# Patient Record
Sex: MALE | Race: WHITE | Employment: OTHER | URBAN - METROPOLITAN AREA
[De-identification: names, ages, dates, MRNs, and addresses within clinical notes are randomized per-mention and may not be internally consistent; named-entity substitution may affect disease eponyms.]

---

## 2020-01-18 ENCOUNTER — HOSPITAL ENCOUNTER (EMERGENCY)
Age: 55
Discharge: HOME | End: 2020-01-18
Attending: EMERGENCY MEDICINE

## 2020-01-18 ENCOUNTER — APPOINTMENT (OUTPATIENT)
Dept: GENERAL RADIOLOGY | Age: 55
End: 2020-01-18

## 2020-01-18 VITALS
DIASTOLIC BLOOD PRESSURE: 68 MMHG | TEMPERATURE: 97.9 F | SYSTOLIC BLOOD PRESSURE: 100 MMHG | HEART RATE: 81 BPM | HEIGHT: 70 IN | OXYGEN SATURATION: 95 % | WEIGHT: 175 LBS | RESPIRATION RATE: 14 BRPM | BODY MASS INDEX: 25.05 KG/M2

## 2020-01-18 LAB
ABSOLUTE EOS #: 0.05 K/UL (ref 0–0.44)
ABSOLUTE IMMATURE GRANULOCYTE: <0.03 K/UL (ref 0–0.3)
ABSOLUTE LYMPH #: 1.61 K/UL (ref 1.1–3.7)
ABSOLUTE MONO #: 0.67 K/UL (ref 0.1–1.2)
ANION GAP SERPL CALCULATED.3IONS-SCNC: 15 MMOL/L (ref 9–17)
BASOPHILS # BLD: 0 % (ref 0–2)
BASOPHILS ABSOLUTE: <0.03 K/UL (ref 0–0.2)
BUN BLDV-MCNC: 8 MG/DL (ref 6–20)
BUN/CREAT BLD: ABNORMAL (ref 9–20)
CALCIUM SERPL-MCNC: 9.7 MG/DL (ref 8.6–10.4)
CHLORIDE BLD-SCNC: 99 MMOL/L (ref 98–107)
CO2: 22 MMOL/L (ref 20–31)
CREAT SERPL-MCNC: 0.99 MG/DL (ref 0.7–1.2)
D-DIMER QUANTITATIVE: 0.24 MG/L FEU
DIFFERENTIAL TYPE: ABNORMAL
EOSINOPHILS RELATIVE PERCENT: 1 % (ref 1–4)
GFR AFRICAN AMERICAN: >60 ML/MIN
GFR NON-AFRICAN AMERICAN: >60 ML/MIN
GFR SERPL CREATININE-BSD FRML MDRD: ABNORMAL ML/MIN/{1.73_M2}
GFR SERPL CREATININE-BSD FRML MDRD: ABNORMAL ML/MIN/{1.73_M2}
GLUCOSE BLD-MCNC: 130 MG/DL (ref 70–99)
HCT VFR BLD CALC: 40.5 % (ref 40.7–50.3)
HEMOGLOBIN: 13.5 G/DL (ref 13–17)
IMMATURE GRANULOCYTES: 0 %
LYMPHOCYTES # BLD: 29 % (ref 24–43)
MCH RBC QN AUTO: 28.9 PG (ref 25.2–33.5)
MCHC RBC AUTO-ENTMCNC: 33.3 G/DL (ref 28.4–34.8)
MCV RBC AUTO: 86.7 FL (ref 82.6–102.9)
MONOCYTES # BLD: 12 % (ref 3–12)
NRBC AUTOMATED: 0 PER 100 WBC
PDW BLD-RTO: 14.3 % (ref 11.8–14.4)
PLATELET # BLD: 291 K/UL (ref 138–453)
PLATELET ESTIMATE: ABNORMAL
PMV BLD AUTO: 9.9 FL (ref 8.1–13.5)
POTASSIUM SERPL-SCNC: 4.3 MMOL/L (ref 3.7–5.3)
RBC # BLD: 4.67 M/UL (ref 4.21–5.77)
RBC # BLD: ABNORMAL 10*6/UL
SEG NEUTROPHILS: 58 % (ref 36–65)
SEGMENTED NEUTROPHILS ABSOLUTE COUNT: 3.27 K/UL (ref 1.5–8.1)
SODIUM BLD-SCNC: 136 MMOL/L (ref 135–144)
TROPONIN INTERP: NORMAL
TROPONIN INTERP: NORMAL
TROPONIN T: NORMAL NG/ML
TROPONIN T: NORMAL NG/ML
TROPONIN, HIGH SENSITIVITY: <6 NG/L (ref 0–22)
TROPONIN, HIGH SENSITIVITY: <6 NG/L (ref 0–22)
VALPROIC ACID LEVEL: 50 UG/ML (ref 50–125)
VALPROIC DATE LAST DOSE: NORMAL
VALPROIC DOSE AMOUNT: NORMAL
VALPROIC TIME LAST DOSE: NORMAL
WBC # BLD: 5.6 K/UL (ref 3.5–11.3)
WBC # BLD: ABNORMAL 10*3/UL

## 2020-01-18 PROCEDURE — 80048 BASIC METABOLIC PNL TOTAL CA: CPT

## 2020-01-18 PROCEDURE — 99284 EMERGENCY DEPT VISIT MOD MDM: CPT

## 2020-01-18 PROCEDURE — 80164 ASSAY DIPROPYLACETIC ACD TOT: CPT

## 2020-01-18 PROCEDURE — 71046 X-RAY EXAM CHEST 2 VIEWS: CPT

## 2020-01-18 PROCEDURE — 84484 ASSAY OF TROPONIN QUANT: CPT

## 2020-01-18 PROCEDURE — 85025 COMPLETE CBC W/AUTO DIFF WBC: CPT

## 2020-01-18 PROCEDURE — 6370000000 HC RX 637 (ALT 250 FOR IP): Performed by: STUDENT IN AN ORGANIZED HEALTH CARE EDUCATION/TRAINING PROGRAM

## 2020-01-18 PROCEDURE — 93005 ELECTROCARDIOGRAM TRACING: CPT | Performed by: STUDENT IN AN ORGANIZED HEALTH CARE EDUCATION/TRAINING PROGRAM

## 2020-01-18 PROCEDURE — 85379 FIBRIN DEGRADATION QUANT: CPT

## 2020-01-18 RX ORDER — HYDROXYZINE PAMOATE 50 MG/1
50 CAPSULE ORAL 2 TIMES DAILY
Status: ON HOLD | COMMUNITY
End: 2021-04-12 | Stop reason: HOSPADM

## 2020-01-18 RX ORDER — AMITRIPTYLINE HYDROCHLORIDE 50 MG/1
50 TABLET, FILM COATED ORAL NIGHTLY
Status: ON HOLD | COMMUNITY
End: 2021-04-12 | Stop reason: HOSPADM

## 2020-01-18 RX ORDER — DIVALPROEX SODIUM 500 MG/1
500 TABLET, DELAYED RELEASE ORAL 2 TIMES DAILY
Status: ON HOLD | COMMUNITY
End: 2020-09-08 | Stop reason: HOSPADM

## 2020-01-18 RX ORDER — DIVALPROEX SODIUM 500 MG/1
500 TABLET, EXTENDED RELEASE ORAL DAILY
Status: DISCONTINUED | OUTPATIENT
Start: 2020-01-18 | End: 2020-01-18 | Stop reason: HOSPADM

## 2020-01-18 RX ORDER — BUSPIRONE HYDROCHLORIDE 15 MG/1
15 TABLET ORAL 3 TIMES DAILY
Status: ON HOLD | COMMUNITY
End: 2021-04-12 | Stop reason: SDUPTHER

## 2020-01-18 RX ORDER — PROPRANOLOL HYDROCHLORIDE 80 MG/1
80 TABLET ORAL DAILY
Status: ON HOLD | COMMUNITY
End: 2021-04-12 | Stop reason: SDUPTHER

## 2020-01-18 RX ADMIN — DIVALPROEX SODIUM 500 MG: 500 TABLET, EXTENDED RELEASE ORAL at 13:29

## 2020-01-18 SDOH — HEALTH STABILITY: MENTAL HEALTH: HOW OFTEN DO YOU HAVE A DRINK CONTAINING ALCOHOL?: NEVER

## 2020-01-18 ASSESSMENT — PAIN DESCRIPTION - PROGRESSION: CLINICAL_PROGRESSION: NOT CHANGED

## 2020-01-18 ASSESSMENT — PAIN DESCRIPTION - DESCRIPTORS: DESCRIPTORS: ACHING

## 2020-01-18 ASSESSMENT — PAIN - FUNCTIONAL ASSESSMENT: PAIN_FUNCTIONAL_ASSESSMENT: ACTIVITIES ARE NOT PREVENTED

## 2020-01-18 ASSESSMENT — PAIN DESCRIPTION - FREQUENCY: FREQUENCY: CONTINUOUS

## 2020-01-18 ASSESSMENT — PAIN SCALES - GENERAL: PAINLEVEL_OUTOF10: 5

## 2020-01-18 ASSESSMENT — PAIN DESCRIPTION - LOCATION: LOCATION: BACK

## 2020-01-18 ASSESSMENT — PAIN DESCRIPTION - ORIENTATION: ORIENTATION: MID

## 2020-01-18 ASSESSMENT — PAIN DESCRIPTION - PAIN TYPE: TYPE: CHRONIC PAIN

## 2020-01-18 NOTE — ED PROVIDER NOTES
[facility]  ED  Emergency Department Encounter  Emergency Medicine Resident     Pt Name: Yulissa Nielsen  MRN: [id number]  [place] 1965  Date of evaluation: 1/18/20  PCP:  No primary care provider on file. CHIEF COMPLAINT       Chief Complaint   Patient presents with    Seizures     reports having seizure last night and this morning, found down on the ground in the snow, unknown how long. tpd concerned for k2. pt denies reports seizure. aox3.  Abrasion     left knuckles, does not know what happened. HISTORY OFPRESENT ILLNESS  (Location/Symptom, Timing/Onset, Context/Setting, Quality, Duration, Modifying Factors,Severity.)      Yulissa Nielsen is a 48 y.o. male who presents with concern for being found down in the snow face first at his facility. Patient states he has a history of seizures. Patient states he was smoking a cigarette, he does not smoke often. Patient denies any drug use. Patient states that he has dabbled in cocaine and methamphetamines previously. Patient states that he just got out of [facility] 1 month ago. Patient denies any chest pain, nausea, vomiting, fever, chills. Patient states that he does have a headache. Patient did not urinate himself, patient did not hit his head, he has no midline tenderness on neck. PAST MEDICAL / SURGICAL / SOCIAL / FAMILY HISTORY      has a past medical history of Hep B w/ coma and Seizures ([facility] [address] has a past surgical history that includes hernia repair and back surgery. Social History     Socioeconomic History    Marital status:       Spouse name: Not on file    Number of children: Not on file    Years of education: Not on file    Highest education level: Not on file   Occupational History    Not on file   Social Needs    Financial resource strain: Not on file    Food insecurity:     Worry: Not on file     Inability: Not on file    Transportation needs:     Medical: Not on file     Non-medical: Not on file

## 2020-01-18 NOTE — ED NOTES
Social work notified pt requesting ride back to [place], [facility]  01/18/20 2258

## 2020-01-18 NOTE — ED PROVIDER NOTES
[address] [facility]     Emergency Department     Faculty Attestation    I performed a history and physical examination of the patient and discussed management with the resident. I have reviewed and agree with the residents findings including all diagnostic interpretations, and treatment plans as written at the time of my review. Any areas of disagreement are noted on the chart. I was personally present for the key portions of any procedures. I have documented in the chart those procedures where I was not present during the key portions. For Physician Assistant/ Nurse Practitioner cases/documentation I have personally evaluated this patient and have completed at least one if not all key elements of the E/M (history, physical exam, and MDM). Additional findings are as noted. EKG Interpretation    Interpreted by me    Rhythm: normal sinus   Rate: normal  Axis: normal  Ectopy: none  Conduction: normal  ST Segments: no acute change  T Waves: no acute change  Q Waves: none    Clinical Impression: no acute changes and normal EKG        (Please note that portions of this note were completed with a voice recognition program.  Efforts were made to edit the dictations but occasionally words are mis-transcribed.)    Michael Amaya.  Quintin Arciniega MD, [facility]  Attending Emergency Medicine Physician        Sol Vaughn MD  01/18/20 8635

## 2020-01-20 LAB
EKG ATRIAL RATE: 93 BPM
EKG P AXIS: 65 DEGREES
EKG P-R INTERVAL: 158 MS
EKG Q-T INTERVAL: 380 MS
EKG QRS DURATION: 98 MS
EKG QTC CALCULATION (BAZETT): 472 MS
EKG R AXIS: -27 DEGREES
EKG T AXIS: 49 DEGREES
EKG VENTRICULAR RATE: 93 BPM

## 2020-01-20 PROCEDURE — 93010 ELECTROCARDIOGRAM REPORT: CPT | Performed by: INTERNAL MEDICINE

## 2020-09-06 ENCOUNTER — HOSPITAL ENCOUNTER (INPATIENT)
Age: 55
LOS: 2 days | Discharge: HOME | DRG: 053 | End: 2020-09-10
Attending: EMERGENCY MEDICINE | Admitting: EMERGENCY MEDICINE
Payer: MEDICAID

## 2020-09-06 ENCOUNTER — APPOINTMENT (OUTPATIENT)
Dept: CT IMAGING | Age: 55
DRG: 053 | End: 2020-09-06
Payer: MEDICAID

## 2020-09-06 PROBLEM — N17.9 AKI (ACUTE KIDNEY INJURY) (HCC): Status: ACTIVE | Noted: 2020-09-06

## 2020-09-06 LAB
ABSOLUTE EOS #: 0.07 K/UL (ref 0–0.44)
ABSOLUTE IMMATURE GRANULOCYTE: <0.03 K/UL (ref 0–0.3)
ABSOLUTE LYMPH #: 2.31 K/UL (ref 1.1–3.7)
ABSOLUTE MONO #: 0.79 K/UL (ref 0.1–1.2)
ACETAMINOPHEN LEVEL: <5 UG/ML (ref 10–30)
AMPHETAMINE SCREEN URINE: NEGATIVE
ANION GAP SERPL CALCULATED.3IONS-SCNC: 15 MMOL/L (ref 9–17)
BARBITURATE SCREEN URINE: NEGATIVE
BASOPHILS # BLD: 0 % (ref 0–2)
BASOPHILS ABSOLUTE: 0.03 K/UL (ref 0–0.2)
BENZODIAZEPINE SCREEN, URINE: NEGATIVE
BUN BLDV-MCNC: 17 MG/DL (ref 6–20)
BUN/CREAT BLD: ABNORMAL (ref 9–20)
BUPRENORPHINE URINE: NORMAL
CALCIUM IONIZED: 1.25 MMOL/L (ref 1.13–1.33)
CALCIUM SERPL-MCNC: 9.7 MG/DL (ref 8.6–10.4)
CANNABINOID SCREEN URINE: NEGATIVE
CHLORIDE BLD-SCNC: 100 MMOL/L (ref 98–107)
CO2: 21 MMOL/L (ref 20–31)
COCAINE METABOLITE, URINE: NEGATIVE
CREAT SERPL-MCNC: 1.41 MG/DL (ref 0.7–1.2)
DIFFERENTIAL TYPE: NORMAL
EOSINOPHILS RELATIVE PERCENT: 1 % (ref 1–4)
ETHANOL PERCENT: <0.01 %
ETHANOL: <10 MG/DL
GFR AFRICAN AMERICAN: >60 ML/MIN
GFR NON-AFRICAN AMERICAN: 53 ML/MIN
GFR SERPL CREATININE-BSD FRML MDRD: ABNORMAL ML/MIN/{1.73_M2}
GFR SERPL CREATININE-BSD FRML MDRD: ABNORMAL ML/MIN/{1.73_M2}
GLUCOSE BLD-MCNC: 113 MG/DL (ref 70–99)
HCT VFR BLD CALC: 41.8 % (ref 40.7–50.3)
HEMOGLOBIN: 13.7 G/DL (ref 13–17)
IMMATURE GRANULOCYTES: 0 %
LYMPHOCYTES # BLD: 29 % (ref 24–43)
MAGNESIUM: 1.9 MG/DL (ref 1.6–2.6)
MCH RBC QN AUTO: 29.9 PG (ref 25.2–33.5)
MCHC RBC AUTO-ENTMCNC: 32.8 G/DL (ref 28.4–34.8)
MCV RBC AUTO: 91.3 FL (ref 82.6–102.9)
MDMA URINE: NORMAL
METHADONE SCREEN, URINE: NEGATIVE
METHAMPHETAMINE, URINE: NORMAL
MONOCYTES # BLD: 10 % (ref 3–12)
NRBC AUTOMATED: 0 PER 100 WBC
OPIATES, URINE: NEGATIVE
OXYCODONE SCREEN URINE: NEGATIVE
PDW BLD-RTO: 14.1 % (ref 11.8–14.4)
PHENCYCLIDINE, URINE: NEGATIVE
PHOSPHORUS: 3.3 MG/DL (ref 2.5–4.5)
PLATELET # BLD: 286 K/UL (ref 138–453)
PLATELET ESTIMATE: NORMAL
PMV BLD AUTO: 10 FL (ref 8.1–13.5)
POTASSIUM SERPL-SCNC: 4.7 MMOL/L (ref 3.7–5.3)
PROPOXYPHENE, URINE: NORMAL
RBC # BLD: 4.58 M/UL (ref 4.21–5.77)
RBC # BLD: NORMAL 10*6/UL
SALICYLATE LEVEL: <1 MG/DL (ref 3–10)
SEG NEUTROPHILS: 60 % (ref 36–65)
SEGMENTED NEUTROPHILS ABSOLUTE COUNT: 4.75 K/UL (ref 1.5–8.1)
SODIUM BLD-SCNC: 136 MMOL/L (ref 135–144)
TEST INFORMATION: NORMAL
TOXIC TRICYCLIC SC,BLOOD: POSITIVE
TRICYCLIC ANTIDEPRESSANTS, UR: NORMAL
VALPROIC ACID LEVEL: 60 UG/ML (ref 50–125)
VALPROIC DATE LAST DOSE: NORMAL
VALPROIC DOSE AMOUNT: NORMAL
VALPROIC TIME LAST DOSE: NORMAL
WBC # BLD: 8 K/UL (ref 3.5–11.3)
WBC # BLD: NORMAL 10*3/UL

## 2020-09-06 PROCEDURE — 99285 EMERGENCY DEPT VISIT HI MDM: CPT

## 2020-09-06 PROCEDURE — 85025 COMPLETE CBC W/AUTO DIFF WBC: CPT

## 2020-09-06 PROCEDURE — G0378 HOSPITAL OBSERVATION PER HR: HCPCS

## 2020-09-06 PROCEDURE — 2580000003 HC RX 258: Performed by: STUDENT IN AN ORGANIZED HEALTH CARE EDUCATION/TRAINING PROGRAM

## 2020-09-06 PROCEDURE — 96361 HYDRATE IV INFUSION ADD-ON: CPT

## 2020-09-06 PROCEDURE — 70450 CT HEAD/BRAIN W/O DYE: CPT

## 2020-09-06 PROCEDURE — 6370000000 HC RX 637 (ALT 250 FOR IP): Performed by: STUDENT IN AN ORGANIZED HEALTH CARE EDUCATION/TRAINING PROGRAM

## 2020-09-06 PROCEDURE — 84100 ASSAY OF PHOSPHORUS: CPT

## 2020-09-06 PROCEDURE — 6370000000 HC RX 637 (ALT 250 FOR IP): Performed by: HEALTH CARE PROVIDER

## 2020-09-06 PROCEDURE — 83735 ASSAY OF MAGNESIUM: CPT

## 2020-09-06 PROCEDURE — G0480 DRUG TEST DEF 1-7 CLASSES: HCPCS

## 2020-09-06 PROCEDURE — 80048 BASIC METABOLIC PNL TOTAL CA: CPT

## 2020-09-06 PROCEDURE — 95816 EEG AWAKE AND DROWSY: CPT | Performed by: PSYCHIATRY & NEUROLOGY

## 2020-09-06 PROCEDURE — 93005 ELECTROCARDIOGRAM TRACING: CPT

## 2020-09-06 PROCEDURE — 82330 ASSAY OF CALCIUM: CPT

## 2020-09-06 PROCEDURE — 80307 DRUG TEST PRSMV CHEM ANLYZR: CPT

## 2020-09-06 PROCEDURE — 80164 ASSAY DIPROPYLACETIC ACD TOT: CPT

## 2020-09-06 RX ORDER — SODIUM CHLORIDE 0.9 % (FLUSH) 0.9 %
10 SYRINGE (ML) INJECTION EVERY 12 HOURS SCHEDULED
Status: DISCONTINUED | OUTPATIENT
Start: 2020-09-06 | End: 2020-09-10 | Stop reason: HOSPADM

## 2020-09-06 RX ORDER — SODIUM CHLORIDE 9 MG/ML
INJECTION, SOLUTION INTRAVENOUS CONTINUOUS
Status: DISCONTINUED | OUTPATIENT
Start: 2020-09-06 | End: 2020-09-10

## 2020-09-06 RX ORDER — AMITRIPTYLINE HYDROCHLORIDE 50 MG/1
100 TABLET, FILM COATED ORAL NIGHTLY
Status: DISCONTINUED | OUTPATIENT
Start: 2020-09-06 | End: 2020-09-10 | Stop reason: HOSPADM

## 2020-09-06 RX ORDER — 0.9 % SODIUM CHLORIDE 0.9 %
1000 INTRAVENOUS SOLUTION INTRAVENOUS ONCE
Status: COMPLETED | OUTPATIENT
Start: 2020-09-06 | End: 2020-09-06

## 2020-09-06 RX ORDER — ACETAMINOPHEN 325 MG/1
650 TABLET ORAL EVERY 4 HOURS PRN
Status: DISCONTINUED | OUTPATIENT
Start: 2020-09-06 | End: 2020-09-10 | Stop reason: HOSPADM

## 2020-09-06 RX ORDER — SODIUM CHLORIDE 0.9 % (FLUSH) 0.9 %
10 SYRINGE (ML) INJECTION PRN
Status: DISCONTINUED | OUTPATIENT
Start: 2020-09-06 | End: 2020-09-10 | Stop reason: HOSPADM

## 2020-09-06 RX ORDER — OXYCODONE HYDROCHLORIDE 5 MG/1
5 TABLET ORAL EVERY 6 HOURS PRN
Status: DISCONTINUED | OUTPATIENT
Start: 2020-09-06 | End: 2020-09-10 | Stop reason: HOSPADM

## 2020-09-06 RX ORDER — DIVALPROEX SODIUM 500 MG/1
500 TABLET, DELAYED RELEASE ORAL 3 TIMES DAILY
Status: DISCONTINUED | OUTPATIENT
Start: 2020-09-06 | End: 2020-09-06

## 2020-09-06 RX ORDER — PROPRANOLOL HYDROCHLORIDE 40 MG/1
80 TABLET ORAL 3 TIMES DAILY
Status: DISCONTINUED | OUTPATIENT
Start: 2020-09-06 | End: 2020-09-10 | Stop reason: HOSPADM

## 2020-09-06 RX ORDER — DIVALPROEX SODIUM 500 MG/1
500 TABLET, DELAYED RELEASE ORAL 2 TIMES DAILY
Status: DISCONTINUED | OUTPATIENT
Start: 2020-09-06 | End: 2020-09-08

## 2020-09-06 RX ORDER — BUSPIRONE HYDROCHLORIDE 15 MG/1
15 TABLET ORAL 3 TIMES DAILY
Status: DISCONTINUED | OUTPATIENT
Start: 2020-09-06 | End: 2020-09-10 | Stop reason: HOSPADM

## 2020-09-06 RX ADMIN — SODIUM CHLORIDE: 9 INJECTION, SOLUTION INTRAVENOUS at 15:19

## 2020-09-06 RX ADMIN — PROPRANOLOL HYDROCHLORIDE 80 MG: 40 TABLET ORAL at 21:58

## 2020-09-06 RX ADMIN — BUSPIRONE HYDROCHLORIDE 15 MG: 15 TABLET ORAL at 21:58

## 2020-09-06 RX ADMIN — AMITRIPTYLINE HYDROCHLORIDE 100 MG: 50 TABLET, FILM COATED ORAL at 21:58

## 2020-09-06 RX ADMIN — DIVALPROEX SODIUM 500 MG: 500 TABLET, DELAYED RELEASE ORAL at 21:58

## 2020-09-06 RX ADMIN — SODIUM CHLORIDE 1000 ML: 9 INJECTION, SOLUTION INTRAVENOUS at 13:19

## 2020-09-06 RX ADMIN — SODIUM CHLORIDE, PRESERVATIVE FREE 10 ML: 5 INJECTION INTRAVENOUS at 21:58

## 2020-09-06 RX ADMIN — BUSPIRONE HYDROCHLORIDE 15 MG: 15 TABLET ORAL at 17:15

## 2020-09-06 RX ADMIN — PROPRANOLOL HYDROCHLORIDE 80 MG: 40 TABLET ORAL at 17:06

## 2020-09-06 RX ADMIN — SERTRALINE 50 MG: 50 TABLET, FILM COATED ORAL at 17:08

## 2020-09-06 ASSESSMENT — PAIN SCALES - GENERAL
PAINLEVEL_OUTOF10: 7
PAINLEVEL_OUTOF10: 4

## 2020-09-06 ASSESSMENT — ENCOUNTER SYMPTOMS
ABDOMINAL PAIN: 0
CHEST TIGHTNESS: 0

## 2020-09-06 ASSESSMENT — PAIN DESCRIPTION - LOCATION
LOCATION: HEAD
LOCATION: KNEE;HEAD

## 2020-09-06 ASSESSMENT — PAIN DESCRIPTION - PAIN TYPE
TYPE: CHRONIC PAIN
TYPE: ACUTE PAIN

## 2020-09-06 NOTE — CARE COORDINATION
Case Management Initial Discharge Plan  Rivas Thapa,             Met with:patient to discuss discharge plans. Information verified: address, contacts, phone number, , insurance Yes    Emergency Contact/Next of Kin name & number: Manolo Ferrera [phone]    PCP: No primary care provider on file. Date of last visit: PT given clinic list     Insurance Provider: [facility]     Discharge Planning    Living Arrangements:  Other (Comment)([place] [address] [place] [address])   Support Systems:  (very limited)    Homeless and staying at the [place] [address]     Patient able to perform ADL's:Independent    Current Services (outpatient & in home) none   DME equipment: cane   DME provider: na    Receiving oral anticoagulation therapy? Denies     If indicated:   Physician managing anticoagulation treatment: na  Where does patient obtain lab work for ATC treatment? na      Potential Assistance Needed:  Durable Medical Equipment    Patient agreeable to home care: No  [place] of choice provided:  n/a    Prior SNF/Rehab Placement and Facility: na  Agreeable to SNF/Rehab: No  [place] of choice provided: n/a     Evaluation: no    Expected Discharge date:       Patient expects to be discharged to:  [facility]  Follow Up Appointment: Kin Gustafson Day/ Time:      Transportation provider: [place]   Transportation arrangements needed for discharge: Yes    Readmission Risk              Risk of Unplanned Readmission:        0             Does patient have a readmission risk score greater than 14?: MARIMAR   If yes, follow-up appointment must be made within 7 days of discharge.      Goals of Care: Get better       Discharge Plan: Back to [place] [address] with cab and cane           Electronically signed by Marlen Sánchez RN on 20 at 5:41 PM EDT

## 2020-09-06 NOTE — ED PROVIDER NOTES
[facility]  ED  Emergency Department Encounter  EmergencyMedicine Resident     Pt Walker Shaver  MRN: [id number]  [place] 1965  Date of evaluation: 9/6/20  PCP:  No primary care provider on file. CHIEF COMPLAINT       Chief Complaint   Patient presents with    Dizziness     Pt reports vertigo, states that he fell. Pt c/o posterior head and right knee pain    Headache    Knee Pain       HISTORY OF PRESENT ILLNESS  (Location/Symptom, Timing/Onset, Context/Setting, Quality, Duration, Modifying Factors, Severity.)      Maria Luz Best is a 48 y.o. male who presents via EMS for chief complaint dizziness, feels like the room is spinning around him. He has a hard time characterizing if this is acute or chronic. On further questioning it appears that he had a syncopal episode describes his vision going black and falling down hitting his head. He has a diffuse headache. He has another chief complaint of numbness and tingling of right hand and both legs. This is acute on chronic in nature. Patient has a history of seizures and is on Depakote. Admits to chronic alcoholism sober for 3 months, denies street drugs. Occasional tobacco use. PAST MEDICAL / SURGICAL / SOCIAL / FAMILY HISTORY      has a past medical history of Hep B w/ coma and Seizures ([facility] [address] has a past surgical history that includes hernia repair and back surgery. Social History     Socioeconomic History    Marital status:       Spouse name: Not on file    Number of children: Not on file    Years of education: Not on file    Highest education level: Not on file   Occupational History    Not on file   Social Needs    Financial resource strain: Not on file    Food insecurity     Worry: Not on file     Inability: Not on file    Transportation needs     Medical: Not on file     Non-medical: Not on file   Tobacco Use    Smoking status: Current Every Day Smoker     Packs/day: 1.00     Types: Cigarettes    Smokeless tobacco: Never Used   Substance and Sexual Activity    Alcohol use: Not Currently     Frequency: Never    Drug use: Not on file    Sexual activity: Not on file   Lifestyle    Physical activity     Days per week: Not on file     Minutes per session: Not on file    Stress: Not on file   Relationships    Social connections     Talks on phone: Not on file     Gets together: Not on file     Attends [employer] service: Not on file     Active member of club or organization: Not on file     Attends meetings of clubs or organizations: Not on file     Relationship status: Not on file    Intimate partner violence     Fear of current or ex partner: Not on file     Emotionally abused: Not on file     Physically abused: Not on file     Forced sexual activity: Not on file   Other Topics Concern    Not on file   Social History Narrative    Not on file       History reviewed. No pertinent family history. Allergies:  Patient has no known allergies. Home Medications:  Prior to Admission medications    Medication Sig Start Date End Date Taking? Authorizing Provider   amitriptyline (ELAVIL) 50 MG tablet Take 50 mg by mouth nightly    Historical Provider, MD   busPIRone (BUSPAR) 15 MG tablet Take 15 mg by mouth 3 times daily    Historical Provider, MD   divalproex (DEPAKOTE) 500 MG DR tablet Take 500 mg by mouth 3 times daily    Historical Provider, MD   hydrOXYzine (VISTARIL) 50 MG capsule Take 50 mg by mouth 3 times daily as needed for Itching    Historical Provider, MD   propranolol (INDERAL) 80 MG tablet Take 80 mg by mouth 3 times daily    Historical Provider, MD   sertraline (ZOLOFT) 50 MG tablet Take 50 mg by mouth daily    Historical Provider, MD       REVIEW OF SYSTEMS    (2-9 systems for level 4, 10 or more for level 5)      Review of Systems   Constitutional: Negative for fever. HENT: Negative for congestion. Eyes: Negative for visual disturbance. Respiratory: Negative for chest tightness. Neurological:      General: No focal deficit present. Mental Status: He is alert and oriented to person, place, and time. Cranial Nerves: No cranial nerve deficit. Sensory: Sensory deficit present. Motor: No weakness. Comments: Patient endorses numbness and tingling right hand and bilateral toes. He has ataxia with finger-to-nose testing. It is unclear whether this is acute or chronic. Strength 5/5 throughout.    Psychiatric:         Mood and Affect: Mood normal.         DIFFERENTIAL  DIAGNOSIS     PLAN (LABS / IMAGING / EKG):  Orders Placed This Encounter   Procedures    CT HEAD WO CONTRAST    CBC Auto Differential    Basic Metabolic Panel    MAGNESIUM    VALPROIC ACID LEVEL, TOTAL    PHOSPHORUS    Calcium, Ionized    DRUG SCREEN MULTI URINE    TOX SCR, BLD, ED    EKG 12 Lead       MEDICATIONS ORDERED:  Orders Placed This Encounter   Medications    0.9 % sodium chloride bolus         DIAGNOSTIC RESULTS / EMERGENCY DEPARTMENT COURSE / MDM     LABS:  Results for orders placed or performed during the hospital encounter of 09/06/20   CBC Auto Differential   Result Value Ref Range    WBC 8.0 3.5 - 11.3 k/uL    RBC 4.58 4.21 - 5.77 m/uL    Hemoglobin 13.7 13.0 - 17.0 g/dL    Hematocrit 41.8 40.7 - 50.3 %    MCV 91.3 82.6 - 102.9 fL    MCH 29.9 25.2 - 33.5 pg    MCHC 32.8 28.4 - 34.8 g/dL    RDW 14.1 11.8 - 14.4 %    Platelets 178 177 - 226 k/uL    MPV 10.0 8.1 - 13.5 fL    NRBC Automated 0.0 0.0 per 100 WBC    Differential Type NOT REPORTED     Seg Neutrophils 60 36 - 65 %    Lymphocytes 29 24 - 43 %    Monocytes 10 3 - 12 %    Eosinophils % 1 1 - 4 %    Basophils 0 0 - 2 %    Immature Granulocytes 0 0 %    Segs Absolute 4.75 1.50 - 8.10 k/uL    Absolute Lymph # 2.31 1.10 - 3.70 k/uL    Absolute Mono # 0.79 0.10 - 1.20 k/uL    Absolute Eos # 0.07 0.00 - 0.44 k/uL    Basophils Absolute 0.03 0.00 - 0.20 k/uL    Absolute Immature Granulocyte <0.03 0.00 - 0.30 k/uL    WBC Morphology NOT REPORTED     RBC Morphology NOT REPORTED     Platelet Estimate NOT REPORTED    Basic Metabolic Panel   Result Value Ref Range    Glucose 113 (H) 70 - 99 mg/dL    BUN 17 6 - 20 mg/dL    CREATININE 1.41 (H) 0.70 - 1.20 mg/dL    Bun/Cre Ratio NOT REPORTED 9 - 20    Calcium 9.7 8.6 - 10.4 mg/dL    Sodium 136 135 - 144 mmol/L    Potassium 4.7 3.7 - 5.3 mmol/L    Chloride 100 98 - 107 mmol/L    CO2 21 20 - 31 mmol/L    Anion Gap 15 9 - 17 mmol/L    GFR Non-African American 53 (L) >60 mL/min    GFR African American >60 >60 mL/min    GFR Comment          GFR Staging NOT REPORTED    MAGNESIUM   Result Value Ref Range    Magnesium 1.9 1.6 - 2.6 mg/dL   VALPROIC ACID LEVEL, TOTAL   Result Value Ref Range    Valproic Acid Lvl 60 50 - 125 ug/mL    Valproic Dose amount NOT REPORTED     Valproic Date last dose NOT REPORTED     Valproic Time last dose NOT REPORTED    PHOSPHORUS   Result Value Ref Range    Phosphorus 3.3 2.5 - 4.5 mg/dL   Calcium, Ionized   Result Value Ref Range    Calcium, Ion 1.25 1.13 - 1.33 mmol/L   TOX SCR, BLD, ED   Result Value Ref Range    Acetaminophen Level <5 (L) 10 - 30 ug/mL    Ethanol <10 <10 mg/dL    Ethanol percent <1.228 <4.071 %    Salicylate Lvl <1 (L) 3 - 10 mg/dL    Toxic Tricyclic Sc,Blood POSITIVE (A) NEGATIVE         RADIOLOGY:  Ct Head Wo Contrast    Result Date: 9/6/2020  EXAMINATION: CT OF THE HEAD WITHOUT CONTRAST  9/6/2020 12:59 pm TECHNIQUE: CT of the head was performed without the administration of intravenous contrast. Dose modulation, iterative reconstruction, and/or weight based adjustment of the mA/kV was utilized to reduce the radiation dose to as low as reasonably achievable. COMPARISON: None. HISTORY: ORDERING SYSTEM PROVIDED HISTORY: fall unknown loc TECHNOLOGIST PROVIDED HISTORY: fall unknown loc Reason for Exam: fall unknown loc Acuity: Unknown Type of Exam: Unknown FINDINGS: BRAIN/VENTRICLES: There is no acute intracranial hemorrhage, mass effect or midline shift.   No abnormal extra-axial fluid collection. The gray-white differentiation is maintained without evidence of an acute infarct. There is no evidence of hydrocephalus. ORBITS: The visualized portion of the orbits demonstrate no acute abnormality. SINUSES: The visualized paranasal sinuses and mastoid air cells demonstrate no acute abnormality. SOFT TISSUES/SKULL:  No acute abnormality of the visualized skull or soft tissues. No acute intracranial abnormality. EKG  EKG Interpretation    Interpreted by me    Rhythm: normal sinus   Rate: normal  Axis: normal  Ectopy: none  Conduction:   ST Segments: no acute change  T Waves: no acute change  Q Waves: none    QTC slightly increased from prior EKGs on record    All EKG's are interpreted by the Emergency Department Physician who either signs or Co-signs this chart in the absence of a cardiologist.    EMERGENCY DEPARTMENT COURSE:  Patient calm and comfortable in ED bed. He appears anxious, jittery with jerking movements. When I asked him if this is acute or chronic he has a hard time characterizing it. He is clear alert and appropriate in his speech however is evasive indulging HPI type medical interview questions. Physical exam was going normally when I asked to inspect his mouth for tongue lacerations he became very upset his demeanor and behavior changed he would not allow a mouth inspection. Several minutes later during the physical exam I asked to inspect his mouth which he eventually allowed, but still remains upset. Tongue had no lacerations oral mucosa was slightly dry there were dental caries. Physical exam remarkable for generalized ataxia and right left finger-to-nose, right and left legs heel-to-shin. Patient has no diplopia or visual disturbance. Feels like the room is spinning around him. Has some horizontal nystagmus bilaterally with horizontal gaze. On chart review patient had a traumatic brain bleed in 2018.   We will plan for blood work, electrolytes, CT head due to ataxia and headache. Blood work remarkable for acute kidney injury, positive tricyclic. Patient says he takes amitriptyline. Given PACO and dehydration will give IV fluids. Imaging unremarkable. On reevaluation the patient says his dizziness and symptoms have improved. I recommended admission to ED observation for IV rehydration and resolution PACO. Patient agrees. PROCEDURES:  None    CONSULTS:  None    CRITICAL CARE:  None    FINAL IMPRESSION      1. Dizziness    2. Prolonged Q-T interval on ECG    3. Acute renal failure, unspecified acute renal failure type ([facility] [address]          DISPOSITION / PLAN     DISPOSITION Decision To Admit 09/06/2020 01:26:51 PM      PATIENT REFERRED TO:  No follow-up provider specified.     DISCHARGE MEDICATIONS:  New Prescriptions    No medications on file       Eliezer Bennett MD  Emergency Medicine Resident    (Please note that portions of thisnote were completed with a voice recognition program.  Efforts were made to edit the dictations but occasionally words are mis-transcribed.)       Eliezer Bennett MD  Resident  09/06/20 9008

## 2020-09-06 NOTE — ED NOTES
Bed: 29  Expected date:   Expected time:   Means of arrival:   Comments:  [facility] [address] S[place], RN  09/06/20 6536

## 2020-09-06 NOTE — ED NOTES
Pt arrived to ED alert and oriented x4 via M-9. Pt c/o dizziness, headache, and knee pain s/p fall. Pt reports that he was walking when he had a seizure and fell. Pt reports that he had positive LOC, states hitting the back of his head and right knee. Pt reports hx of seizure and states that he is compliant with his meds. Pt denies chest pain, SOB, or diff breathing. Pt denies having been around anyone suspected to have COVID-19 or anyone that has been sick, denies recent travel outside the [facility] or [address]. Pt placed on cardiac monitor, continuous pulse ox, and BP cuff. RR even and unlabored. NAD noted. Will continue monitor.      Jenny Ornelas RN  09/06/20 5260

## 2020-09-06 NOTE — ED PROVIDER NOTES
[facility]     Emergency Department     Faculty Attestation    I performed a history and physical examination of the patient and discussed management with the resident. I have reviewed and agree with the residents findings including all diagnostic interpretations, and treatment plans as written at the time of my review. Any areas of disagreement are noted on the chart. I was personally present for the key portions of any procedures. I have documented in the chart those procedures where I was not present during the key portions. For Physician Assistant/ Nurse Practitioner cases/documentation I have personally evaluated this patient and have completed at least one if not all key elements of the E/M (history, physical exam, and MDM). Additional findings are as noted. This patient was evaluated in the Emergency Department for symptoms described in the history of present illness. The patient was evaluated in the context of the global COVID-19 pandemic, which necessitated consideration that the patient might be at risk for infection with the SARS-CoV-2 virus that causes COVID-19. Institutional protocols and algorithms that pertain to the evaluation of patients at risk for COVID-19 are in a state of rapid change based on information released by regulatory bodies including the CDC and federal and state organizations. These policies and algorithms were followed during the patient's care in the ED. Primary Care Physician: No primary care provider on file. History: This is a 48 y.o. male who presents to the Emergency Department with complaint of dizzy, severe headache, seizure, fall. Patient yesterday stated he \"fell out\". Teagannatalya Blackmon today he had a seizure again falling hitting the back of his head. He also complains of persistent dizziness with a severe headache. Physical:   height is 5' 10\" (1.778 m) and weight is 175 lb (79.4 kg).  His oral temperature is 98.5 °F (36.9 °C). His blood pressure is 125/82 and his pulse is 60. His respiration is 16 and oxygen saturation is 98%. There is no midline cervical spinal tenderness. The patient has resting tremors and poor finger-nose coordination, lungs are clear to auscultation bilateral, heart bradycardic with a regular rhythm, abdomen is soft nontender    Impression: Severe headache, fall, seizure,    Plan: CT scan, EKG, CBC, BMP, troponin, valproic acid      EKG Interpretation    Interpreted by me  Sinus bradycardia ventricular 57, normal VT interval, nonspecific interventricular conduction delay, normal axis, compared EKG of January 18, 2020, nonspecific interventricular conduction delay appears to be new, ventricular has decreased by 39    MIPS 415     A head CT was ordered, but not by an emergency care provider: No    A head CT was ordered by an emergency care provider, and some of the indications for ordering the head CT included  Measure Exclusions:  Patient has a ventricular shunt: No  Patient has a brain tumor: No  Patient has multi-system trauma: No  Patient is pregnant: No  Patient is taking an antiplatelet medication (excluding aspirin): No  Patient is 72years old or older: No    Signs and Symptoms:  Patients GCS was less than 15: No  Severe Headache: Yes  Vomiting: No      (Please note that portions of this note were completed with a voice recognition program.  Efforts were made to edit the dictations but occasionally words are mis-transcribed.)    Penny Roth MD, [facility]  Attending Emergency Medicine Physician        Yonis Fallon MD  09/06/20 7927

## 2020-09-06 NOTE — H&P
[facility]  CDU / OBSERVATION eNCOUnter  Resident Note     Pt Name: Ronald Roger  MRN: [id number]  [place] 1965  Date of evaluation: 9/6/20  Patient's PCP is : No primary care provider on file. CHIEF COMPLAINT       Chief Complaint   Patient presents with    Dizziness     Pt reports vertigo, states that he fell. Pt c/o posterior head and right knee pain    Headache    Knee Pain         HISTORY OF PRESENT ILLNESS    Ronald Roger is a 48 y.o. male with history of epilepsy who presents with dizziness and ataxia status post seizure episode. Patient reports he had 2 seizure episodes in which he lost consciousness and has no recollection of the event within the last day. Patient is now complaining of worsening ataxia and tremor since these episodes. He takes 500 mg of Depakote 3 times daily and reports compliance with the medication. Last medication adjustment was approximately 10 days ago with his primary care physician. Last seizure was approximately 1 month ago, which followed a similar pattern to this episode. He does not have regular neurologic follow-up.     Location/Symptom: Dizziness/ataxia status post seizure  Timing/Onset: 1 day  Provocation: Unprovoked  Quality: Not applicable  Radiation: Not applicable  Severity: Moderate  Timing/Duration: Intermittent  Modifying Factors: None    REVIEW OF SYSTEMS       General ROS - No fevers, No malaise   Ophthalmic ROS - No discharge, No changes in vision  ENT ROS -  No sore throat, No rhinorrhea,   Respiratory ROS - no shortness of breath, no cough, no  wheezing  Cardiovascular ROS - No chest pain, no dyspnea on exertion  Gastrointestinal ROS - No abdominal pain, no nausea or vomiting, no change in bowel habits, no black or bloody stools  Genito-Urinary ROS - No dysuria, trouble voiding, or hematuria  Musculoskeletal ROS - No myalgias, No arthalgias  Neurological ROS -Per HPI listed above dermatological ROS - No lesions, No rash (PQRS) Advance directives on face sheet per hospital policy. No change unless specifically mentioned in chart    [address]    has a past medical history of Hep B w/ coma and Seizures ([facility] [address] I have reviewed the past medical history with the patient and it is pertinent to this complaint. SURGICAL HISTORY      has a past surgical history that includes hernia repair and back surgery. I have reviewed and agree with Surgical History entered and it is not pertinent to this complaint. CURRENT MEDICATIONS     busPIRone (BUSPAR) tablet 15 mg, TID  divalproex (DEPAKOTE) DR tablet 500 mg, TID  propranolol (INDERAL) tablet 80 mg, TID  sertraline (ZOLOFT) tablet 50 mg, Daily  sodium chloride flush 0.9 % injection 10 mL, 2 times per day  sodium chloride flush 0.9 % injection 10 mL, PRN  acetaminophen (TYLENOL) tablet 650 mg, Q4H PRN  enoxaparin (LOVENOX) injection 40 mg, Daily  0.9 % sodium chloride infusion, Continuous        All medication charted and reviewed. ALLERGIES     has No Known Allergies. FAMILY HISTORY     has no family status information on file. family history is not on file. The patient denies any pertinent family history. I have reviewed and agree with the family history entered. I have reviewed the Family History and it is not significant to the case    SOCIAL HISTORY      reports that he has been smoking cigarettes. He has been smoking about 1.00 pack per day. He has never used smokeless tobacco. He reports previous alcohol use. I have reviewed and agree with all Social.  There are no concerns for substance abuse/use. PHYSICAL EXAM     INITIAL VITALS:  height is 5' 10\" (1.778 m) and weight is 175 lb (79.4 kg). His oral temperature is 97.5 °F (36.4 °C). His blood pressure is 110/78 and his pulse is 60. His respiration is 18 and oxygen saturation is 98%.       CONSTITUTIONAL: AOx4, no apparent distress, appears stated age    HEAD: normocephalic, atraumatic   EYES: PERRLA, EOMI    ENT: moist mucous membranes, uvula midline   NECK: supple, symmetric   BACK: symmetric   LUNGS: clear to auscultation bilaterally   CARDIOVASCULAR: regular rate and rhythm, no murmurs, rubs or gallops   ABDOMEN: soft, non-tender, non-distended with normal active bowel sounds   NEUROLOGIC:   AO x4, cranial nerves II through XII intact, speech staccato, resting tremor of bilateral hands right worse than left. Impaired finger-nose-finger. Unable to test gait due to fall precautions. MUSCULOSKELETAL: no clubbing, cyanosis or edema   SKIN: no rash or wounds       DIAGNOSTIC RESULTS     EKG: All EKG's are interpreted by the Observation Physician who either signs or Co-signs this chart in the absence of a cardiologist.    EKG Interpretation    Interpreted by observation physician    Rhythm: normal sinus   Rate: 50-60  Axis: normal  Ectopy: none  Conduction: normal  ST Segments: normal  T Waves: normal  Q Waves: none    Clinical Impression: Sinus bradycardia    Tony Dickey MD        RADIOLOGY:   I directly visualized the following  images and reviewed the radiologist interpretations:    Ct Head Wo Contrast    Result Date: 9/6/2020  EXAMINATION: CT OF THE HEAD WITHOUT CONTRAST  9/6/2020 12:59 pm TECHNIQUE: CT of the head was performed without the administration of intravenous contrast. Dose modulation, iterative reconstruction, and/or weight based adjustment of the mA/kV was utilized to reduce the radiation dose to as low as reasonably achievable. COMPARISON: None. HISTORY: ORDERING SYSTEM PROVIDED HISTORY: fall unknown loc TECHNOLOGIST PROVIDED HISTORY: fall unknown loc Reason for Exam: fall unknown loc Acuity: Unknown Type of Exam: Unknown FINDINGS: BRAIN/VENTRICLES: There is no acute intracranial hemorrhage, mass effect or midline shift. No abnormal extra-axial fluid collection. The gray-white differentiation is maintained without evidence of an acute infarct.   There is no evidence of hydrocephalus. ORBITS: The visualized portion of the orbits demonstrate no acute abnormality. SINUSES: The visualized paranasal sinuses and mastoid air cells demonstrate no acute abnormality. SOFT TISSUES/SKULL:  No acute abnormality of the visualized skull or soft tissues. No acute intracranial abnormality. LABS:  I have reviewed and interpreted all available lab results. Labs Reviewed   BASIC METABOLIC PANEL - Abnormal; Notable for the following components:       Result Value    Glucose 113 (*)     CREATININE 1.41 (*)     GFR Non- 53 (*)     All other components within normal limits   TOX SCR, BLD, ED - Abnormal; Notable for the following components:    Acetaminophen Level <5 (*)     Salicylate Lvl <1 (*)     Toxic Tricyclic Sc,Blood POSITIVE (*)     All other components within normal limits   CBC WITH AUTO DIFFERENTIAL   MAGNESIUM   VALPROIC ACID LEVEL, TOTAL   PHOSPHORUS   CALCIUM, IONIZED   URINE DRUG SCREEN         CDU IMPRESSION / Justin Ty is a 48 y.o. male who presents with dizziness, worsening ataxia, head and neck pain status post fall. Pattern most consistent with patient's previous seizures. Cardiac etiology is less of a concern, given patient's history and clinical findings. Patient's valproic acid level is within therapeutic range, but still appears to have poor control of his seizures. Patient was also found to have acute kidney injury, most likely due to dehydration. · Follow-up neurology consult and recommendations  · IV hydration  · Pain control for headaches  · Continue home medications  · Monitor vitals, labs, and imaging  · DISPO: pending consults and clinical improvement    CONSULTS:    None    PROCEDURES:  Not indicated       PATIENT REFERRED TO:    No follow-up provider specified. --  Anabell Gaston MD   Emergency Medicine Resident     This dictation was generated by voice recognition computer software.   Although all attempts are made to edit the dictation for accuracy, there may be errors in the transcription that are not intended.

## 2020-09-07 LAB
ANION GAP SERPL CALCULATED.3IONS-SCNC: 12 MMOL/L (ref 9–17)
BUN BLDV-MCNC: 11 MG/DL (ref 6–20)
BUN/CREAT BLD: NORMAL (ref 9–20)
CALCIUM SERPL-MCNC: 8.7 MG/DL (ref 8.6–10.4)
CHLORIDE BLD-SCNC: 103 MMOL/L (ref 98–107)
CO2: 21 MMOL/L (ref 20–31)
CREAT SERPL-MCNC: 1.02 MG/DL (ref 0.7–1.2)
GFR AFRICAN AMERICAN: >60 ML/MIN
GFR NON-AFRICAN AMERICAN: >60 ML/MIN
GFR SERPL CREATININE-BSD FRML MDRD: NORMAL ML/MIN/{1.73_M2}
GFR SERPL CREATININE-BSD FRML MDRD: NORMAL ML/MIN/{1.73_M2}
GLUCOSE BLD-MCNC: 94 MG/DL (ref 70–99)
POTASSIUM SERPL-SCNC: 4.3 MMOL/L (ref 3.7–5.3)
SODIUM BLD-SCNC: 136 MMOL/L (ref 135–144)

## 2020-09-07 PROCEDURE — 6370000000 HC RX 637 (ALT 250 FOR IP): Performed by: HEALTH CARE PROVIDER

## 2020-09-07 PROCEDURE — 99223 1ST HOSP IP/OBS HIGH 75: CPT | Performed by: PSYCHIATRY & NEUROLOGY

## 2020-09-07 PROCEDURE — 80048 BASIC METABOLIC PNL TOTAL CA: CPT

## 2020-09-07 PROCEDURE — 6370000000 HC RX 637 (ALT 250 FOR IP): Performed by: STUDENT IN AN ORGANIZED HEALTH CARE EDUCATION/TRAINING PROGRAM

## 2020-09-07 PROCEDURE — 6360000002 HC RX W HCPCS: Performed by: STUDENT IN AN ORGANIZED HEALTH CARE EDUCATION/TRAINING PROGRAM

## 2020-09-07 PROCEDURE — 96372 THER/PROPH/DIAG INJ SC/IM: CPT

## 2020-09-07 PROCEDURE — 2580000003 HC RX 258: Performed by: STUDENT IN AN ORGANIZED HEALTH CARE EDUCATION/TRAINING PROGRAM

## 2020-09-07 PROCEDURE — G0378 HOSPITAL OBSERVATION PER HR: HCPCS

## 2020-09-07 PROCEDURE — 6370000000 HC RX 637 (ALT 250 FOR IP): Performed by: FAMILY MEDICINE

## 2020-09-07 PROCEDURE — 96361 HYDRATE IV INFUSION ADD-ON: CPT

## 2020-09-07 PROCEDURE — 36415 COLL VENOUS BLD VENIPUNCTURE: CPT

## 2020-09-07 RX ORDER — DIPHENHYDRAMINE HCL 25 MG
25 TABLET ORAL EVERY 8 HOURS PRN
Status: DISCONTINUED | OUTPATIENT
Start: 2020-09-07 | End: 2020-09-10 | Stop reason: HOSPADM

## 2020-09-07 RX ORDER — MECLIZINE HCL 12.5 MG/1
25 TABLET ORAL 3 TIMES DAILY
Status: DISCONTINUED | OUTPATIENT
Start: 2020-09-07 | End: 2020-09-10 | Stop reason: HOSPADM

## 2020-09-07 RX ORDER — SODIUM CHLORIDE 9 MG/ML
INJECTION, SOLUTION INTRAVENOUS CONTINUOUS
Status: DISCONTINUED | OUTPATIENT
Start: 2020-09-07 | End: 2020-09-10

## 2020-09-07 RX ADMIN — BUSPIRONE HYDROCHLORIDE 15 MG: 15 TABLET ORAL at 07:36

## 2020-09-07 RX ADMIN — DIVALPROEX SODIUM 500 MG: 500 TABLET, DELAYED RELEASE ORAL at 07:36

## 2020-09-07 RX ADMIN — DIPHENHYDRAMINE HCL 25 MG: 25 TABLET ORAL at 13:13

## 2020-09-07 RX ADMIN — BUSPIRONE HYDROCHLORIDE 15 MG: 15 TABLET ORAL at 13:13

## 2020-09-07 RX ADMIN — MECLIZINE HYDROCHLORIDE 25 MG: 12.5 TABLET, FILM COATED ORAL at 13:13

## 2020-09-07 RX ADMIN — ENOXAPARIN SODIUM 40 MG: 40 INJECTION SUBCUTANEOUS at 07:37

## 2020-09-07 RX ADMIN — MECLIZINE HYDROCHLORIDE 25 MG: 12.5 TABLET, FILM COATED ORAL at 22:08

## 2020-09-07 RX ADMIN — PROPRANOLOL HYDROCHLORIDE 80 MG: 40 TABLET ORAL at 22:07

## 2020-09-07 RX ADMIN — DIVALPROEX SODIUM 500 MG: 500 TABLET, DELAYED RELEASE ORAL at 22:07

## 2020-09-07 RX ADMIN — BUSPIRONE HYDROCHLORIDE 15 MG: 15 TABLET ORAL at 22:08

## 2020-09-07 RX ADMIN — AMITRIPTYLINE HYDROCHLORIDE 100 MG: 50 TABLET, FILM COATED ORAL at 22:07

## 2020-09-07 RX ADMIN — SERTRALINE 50 MG: 50 TABLET, FILM COATED ORAL at 07:37

## 2020-09-07 RX ADMIN — SODIUM CHLORIDE, PRESERVATIVE FREE 10 ML: 5 INJECTION INTRAVENOUS at 22:07

## 2020-09-07 ASSESSMENT — PAIN SCALES - GENERAL: PAINLEVEL_OUTOF10: 0

## 2020-09-07 NOTE — DISCHARGE INSTR - COC
Continuity of Care Form    Patient Name: Melissa Tobar   :  1965  MRN:  [id number]    Admit date:  2020  Discharge date:  ***    Code Status Order: Full Code   Advance Directives:   Advance Care Flowsheet Documentation     Date/Time Healthcare Directive Type of Healthcare Directive Copy in [address] Agent's Name Healthcare Agent's Phone Number    [phone]  No, patient does not have an advance directive for healthcare treatment -- -- -- -- --          Admitting Physician:  lEia Schmitt MD  PCP: No primary care provider on file. Discharging Nurse: [facility] Unit/Room#: 7882/9993-22  Discharging Unit Phone Number: ***    Emergency Contact:   Extended Emergency Contact Information  Primary Emergency Contact: Karen Jaime Phone: [phone]  Relation: Other    Past Surgical History:  Past Surgical History:   Procedure Laterality Date    BACK SURGERY      HERNIA REPAIR         Immunization History: There is no immunization history on file for this patient.     Active Problems:  Patient Active Problem List   Diagnosis Code    PACO (acute kidney injury) ([facility] [address] N17.9       Isolation/Infection:   Isolation          No Isolation        Patient Infection Status     None to display          Nurse Assessment:  Last Vital Signs: /70   Pulse 98   Temp 97.2 °F (36.2 °C) (Oral)   Resp 16   Ht 5' 10\" (1.778 m)   Wt 175 lb (79.4 kg)   SpO2 98%   BMI 25.11 kg/m²     Last documented pain score (0-10 scale): Pain Level: 0  Last Weight:   Wt Readings from Last 1 Encounters:   20 175 lb (79.4 kg)     Mental Status:  {IP PT MENTAL STATUS:}    IV Access:  { STAS IV ACCESS:147105502}    Nursing Mobility/ADLs:  Walking   {CHP DME LZBT:410129910}  Transfer  {CHP DME UVON:923386506}  Bathing  {CHP DME BYRJ:384228388}  Dressing  {CHP DME NWQT:314436155}  Toileting  {CHP DME SPD}  Feeding  {CHP DME WCYU:569591877}  Med Admin  {[facility] DME KLJF:675942214}  Med Delivery   [address] MED Delivery:851398911}    Wound Care Documentation and Therapy:        Elimination:  Continence:   · Bowel: {YES / KN:81027}  · Bladder: {YES / ZP:68397}  Urinary Catheter: {Urinary Catheter:186178745}   Colostomy/Ileostomy/Ileal Conduit: {YES / AMIE:90207}       Date of Last BM: ***    Intake/Output Summary (Last 24 hours) at 2020 1024  Last data filed at 2020 1422  Gross per 24 hour   Intake 1000 ml   Output --   Net 1000 ml     I/O last 3 completed shifts:   In: 1000 [IV Piggyback:1000]  Out: -     Safety Concerns:     [address] Safety Concerns:009404412}    Impairments/Disabilities:      [address] Impairments/Disabilities:467585526}    Nutrition Therapy:  Current Nutrition Therapy:   [address] Diet List:169190749}    Routes of Feeding: {[facility] DME Other Feedings:742994557}  Liquids: {Slp liquid thickness:48755}  Daily Fluid Restriction: {[facility] DME Yes amt example:390673064}  Last Modified Barium Swallow with Video (Video Swallowing Test): {Done Not Done GDVW:950895045}    Treatments at the Time of Hospital Discharge:   Respiratory Treatments: ***  Oxygen Therapy:  {Therapy; copd oxygen:77689}  Ventilator:    { CC Vent INZD:080926635}    Rehab Therapies: {THERAPEUTIC INTERVENTION:2333542858}  Weight Bearing Status/Restrictions: [address] [email or web address]  Weight Bearin}  Other Medical Equipment (for information only, NOT a DME order):  {EQUIPMENT:260584951}  Other Treatments: ***    Patient's personal belongings (please select all that are sent with patient):  {[facility] DME Belongings:271114410}    RN SIGNATURE:  {Esignature:657022228}    CASE MANAGEMENT/SOCIAL WORK SECTION    Inpatient Status Date: ***    Readmission Risk Assessment Score:  Readmission Risk              Risk of Unplanned Readmission:        0           Discharging to Facility/ Agency   · Name:   · Address:  · Phone:  · Fax:    Dialysis Facility (if applicable)   · Name:  · Address:  · Dialysis Schedule:  · Phone:  · Fax:    / signature: {Esignature:887638572}    PHYSICIAN SECTION    Prognosis: Good    Condition at Discharge: Stable    Rehab Potential (if transferring to Rehab): Good    Recommended Labs or Other Treatments After Discharge:     Physician Certification: I certify the above information and transfer of Lisa Lynn  is necessary for the continuing treatment of the diagnosis listed and that he requires [address] for less 30 days.      Update Admission H&P: No change in H&P    PHYSICIAN SIGNATURE:  Electronically signed by Kelsey Ross MD on 9/7/20 at 10:24 AM EDT

## 2020-09-07 NOTE — PROGRESS NOTES
CDU Daily Progress Note  Attending Physician       Pt Name: Stella Alba  MRN: [id number]  [place] 1965  Date of evaluation: 9/7/20    I performed a history and physical examination of the patient and discussed management with the resident. I reviewed the residents note and agree with the documented findings and plan of care. Any areas of disagreement are noted on the chart. I was personally present for the key portions of any procedures. I have documented in the chart those procedures where I was not present during the key portions. I have reviewed the emergency nurses triage note. I agree with the chief complaint, past medical history, past surgical history, allergies, medications, social and family history as documented unless otherwise noted below. Documentation of the HPI, Physical Exam and Medical Decision Making performed by medical students or scribes is based on my personal performance of the HPI, PE and MDM. For Physician Assistant/ Nurse Practitioner cases/documentation I have personally evaluated this patient and have completed at least one if not all key elements of the E/M (history, physical exam, and MDM). Additional findings are as noted. The Family History, Social History and Review of Systems are unchanged from the previous day. No significant events overnight. Not diabetic. Patient smokes 1 ppd of cigarettes for many years. Smoking cessation counseling for 3 minutes. Discussed the effects of smoking in regards to healing. I explained how this negatively effects his condition, will contribute to increased recovery time, and possible lead to further health problems in the future. Creatinine has improved. Still ataxic. CT head negative. Awaiting neurology input for symptoms.     Fidel Tee MD  Attending Physician  Critical Decision Unit

## 2020-09-07 NOTE — PROGRESS NOTES
OBS/CDU   RESIDENT NOTE      Patients PCP is: No primary care provider on file. SUBJECTIVE      Overnight patient states he walked to the bathroom, felt unsteady on his feet and fell. He grabbed the shower bar in the bathroom to break his fall. He states he fell on his butt and struck his head against the wall. Patient did not lose consciousness and was able to get up on his own and walk back to bed. Has been able to tolerate a full diet without nausea or vomiting. The patient is urinating on his own and is passing flatus. Denies fever, chills, nausea, vomiting, chest pain, shortness of breath, abdominal pain, focal weakness, numbness, tingling, urinary/bowel symptoms, vision changes, visual hallucinations, or headache. PHYSICAL EXAM      General: NAD, AO X 3  Heent: EMOI, PERRL, no laceration, ecchymosis, hematoma seen  Neck: SUPPLE, NO JVD  Cardiovascular: RRR, S1S2  Pulmonary: CTAB, NO SOB  Abdomen: SOFT, NTTP, ND, +BS  Extremities: +2/4 PULSES DISTAL, NO SWELLING  Neuro / Psych: NO NUMBNESS OR TINGLING, MENTATION AT BASELINE, patient does exhibit some horizontal nystagmus, exhibits some difficulty with speech. Patient refuses to open his mouth for palate examination during cranial nerve exam.     PERTINENT TEST /EXAMS      I have reviewed all available laboratory results. MEDICATIONS CURRENT   busPIRone (BUSPAR) tablet 15 mg, TID  propranolol (INDERAL) tablet 80 mg, TID  sertraline (ZOLOFT) tablet 50 mg, Daily  sodium chloride flush 0.9 % injection 10 mL, 2 times per day  sodium chloride flush 0.9 % injection 10 mL, PRN  acetaminophen (TYLENOL) tablet 650 mg, Q4H PRN  enoxaparin (LOVENOX) injection 40 mg, Daily  0.9 % sodium chloride infusion, Continuous  amitriptyline (ELAVIL) tablet 100 mg, Nightly  oxyCODONE (ROXICODONE) immediate release tablet 5 mg, Q6H PRN  divalproex (DEPAKOTE) DR tablet 500 mg, BID        All medication charted and reviewed.     CONSULTS      IP CONSULT TO NEUROLOGY    ASSESSMENT/PLAN       Gary Naylor is a 48 y.o. male who presents with acute seizure due to unclear etiology. Patient is on depakote which he states he has been taking, depakote levels are not below the lower limit. He is also endorsing difficulty with speech, stating he loses his words while talking. He also states he has not been walking normally. · Neurology consult, will follow up recommendations  · Creatinine has normalized  · Continue home medications and pain control  · Monitor vitals, labs, and imaging  · DISPO: pending consults and clinical improvement    --  Bora [address]  Emergency Medicine Resident Physician     This dictation was generated by voice recognition computer software. Although all attempts are made to edit the dictation for accuracy, there may be errors in the transcription that are not intended.

## 2020-09-07 NOTE — CONSULTS
smoking,alcohol use about 2.5 years ago,pot and cocaine abuse in past.    MRI Brain WO 18: subacute poserior left SAH stable. MRI Brain W WO: 17: unremarkable  EE18: no epileptiform activity. Mild diffuse encephalopathy. EEG 17:no epileptiform discharges. PAST MEDICAL HISTORY :       Past Medical History:        Diagnosis Date    Hep B w/ coma     Seizures ([address]        Past Surgical History:        Procedure Laterality Date    BACK SURGERY      HERNIA REPAIR         Social History:   Social History     Socioeconomic History    Marital status:       Spouse name: Not on file    Number of children: Not on file    Years of education: Not on file    Highest education level: Not on file   Occupational History    Not on file   Social Needs    Financial resource strain: Not on file    Food insecurity     Worry: Not on file     Inability: Not on file    Transportation needs     Medical: Not on file     Non-medical: Not on file   Tobacco Use    Smoking status: Current Every Day Smoker     Packs/day: 1.00     Types: Cigarettes    Smokeless tobacco: Never Used   Substance and Sexual Activity    Alcohol use: Not Currently     Frequency: Never    Drug use: Not on file    Sexual activity: Not on file   Lifestyle    Physical activity     Days per week: Not on file     Minutes per session: Not on file    Stress: Not on file   Relationships    Social connections     Talks on phone: Not on file     Gets together: Not on file     Attends Denominational service: Not on file     Active member of club or organization: Not on file     Attends meetings of clubs or organizations: Not on file     Relationship status: Not on file    Intimate partner violence     Fear of current or ex partner: Not on file     Emotionally abused: Not on file     Physically abused: Not on file     Forced sexual activity: Not on file   Other Topics Concern    Not on file   Social History Narrative    Not on file Family History:   History reviewed. No pertinent family history. Allergies:  Patient has no known allergies. Home Medications:  Prior to Admission medications    Medication Sig Start Date End Date Taking?  Authorizing Provider   amitriptyline (ELAVIL) 50 MG tablet Take 50 mg by mouth nightly    Historical Provider, MD   busPIRone (BUSPAR) 15 MG tablet Take 15 mg by mouth 3 times daily    Historical Provider, MD   divalproex (DEPAKOTE) 500 MG DR tablet Take 500 mg by mouth 3 times daily    Historical Provider, MD   hydrOXYzine (VISTARIL) 50 MG capsule Take 50 mg by mouth 3 times daily as needed for Itching    Historical Provider, MD   propranolol (INDERAL) 80 MG tablet Take 80 mg by mouth 3 times daily    Historical Provider, MD   sertraline (ZOLOFT) 50 MG tablet Take 50 mg by mouth daily    Historical Provider, MD       Current Medications:   Current Facility-Administered Medications: busPIRone (BUSPAR) tablet 15 mg, 15 mg, Oral, TID  propranolol (INDERAL) tablet 80 mg, 80 mg, Oral, TID  sertraline (ZOLOFT) tablet 50 mg, 50 mg, Oral, Daily  sodium chloride flush 0.9 % injection 10 mL, 10 mL, Intravenous, 2 times per day  sodium chloride flush 0.9 % injection 10 mL, 10 mL, Intravenous, PRN  acetaminophen (TYLENOL) tablet 650 mg, 650 mg, Oral, Q4H PRN  enoxaparin (LOVENOX) injection 40 mg, 40 mg, Subcutaneous, Daily  0.9 % sodium chloride infusion, , Intravenous, Continuous  amitriptyline (ELAVIL) tablet 100 mg, 100 mg, Oral, Nightly  oxyCODONE (ROXICODONE) immediate release tablet 5 mg, 5 mg, Oral, Q6H PRN  divalproex (DEPAKOTE) DR tablet 500 mg, 500 mg, Oral, BID    REVIEW OF SYSTEMS:       CONSTITUTIONAL: negative for fatigue and malaise   EYES: negative for double vision and photophobia    HEENT: negative for tinnitus and sore throat   RESPIRATORY: negative for cough, shortness of breath   CARDIOVASCULAR: negative for chest pain, palpitations   GASTROINTESTINAL: negative for nausea, vomiting

## 2020-09-08 ENCOUNTER — APPOINTMENT (OUTPATIENT)
Dept: MRI IMAGING | Age: 55
DRG: 053 | End: 2020-09-08
Payer: MEDICAID

## 2020-09-08 LAB
ALBUMIN SERPL-MCNC: 3.2 G/DL (ref 3.5–5.2)
ALBUMIN/GLOBULIN RATIO: 1 (ref 1–2.5)
ALP BLD-CCNC: 48 U/L (ref 40–129)
ALT SERPL-CCNC: 10 U/L (ref 5–41)
AST SERPL-CCNC: 18 U/L
BILIRUB SERPL-MCNC: <0.1 MG/DL (ref 0.3–1.2)
BILIRUBIN DIRECT: <0.08 MG/DL
BILIRUBIN, INDIRECT: ABNORMAL MG/DL (ref 0–1)
GLOBULIN: ABNORMAL G/DL (ref 1.5–3.8)
TOTAL PROTEIN: 6.4 G/DL (ref 6.4–8.3)

## 2020-09-08 PROCEDURE — G0378 HOSPITAL OBSERVATION PER HR: HCPCS

## 2020-09-08 PROCEDURE — 80076 HEPATIC FUNCTION PANEL: CPT

## 2020-09-08 PROCEDURE — 95819 EEG AWAKE AND ASLEEP: CPT

## 2020-09-08 PROCEDURE — A9579 GAD-BASE MR CONTRAST NOS,1ML: HCPCS | Performed by: FAMILY MEDICINE

## 2020-09-08 PROCEDURE — 1200000000 HC SEMI PRIVATE

## 2020-09-08 PROCEDURE — 70553 MRI BRAIN STEM W/O & W/DYE: CPT

## 2020-09-08 PROCEDURE — 2580000003 HC RX 258: Performed by: STUDENT IN AN ORGANIZED HEALTH CARE EDUCATION/TRAINING PROGRAM

## 2020-09-08 PROCEDURE — 6370000000 HC RX 637 (ALT 250 FOR IP): Performed by: HEALTH CARE PROVIDER

## 2020-09-08 PROCEDURE — 96361 HYDRATE IV INFUSION ADD-ON: CPT

## 2020-09-08 PROCEDURE — 6370000000 HC RX 637 (ALT 250 FOR IP): Performed by: STUDENT IN AN ORGANIZED HEALTH CARE EDUCATION/TRAINING PROGRAM

## 2020-09-08 PROCEDURE — 6370000000 HC RX 637 (ALT 250 FOR IP): Performed by: NURSE PRACTITIONER

## 2020-09-08 PROCEDURE — 6370000000 HC RX 637 (ALT 250 FOR IP): Performed by: FAMILY MEDICINE

## 2020-09-08 PROCEDURE — 6360000004 HC RX CONTRAST MEDICATION: Performed by: FAMILY MEDICINE

## 2020-09-08 PROCEDURE — 36415 COLL VENOUS BLD VENIPUNCTURE: CPT

## 2020-09-08 PROCEDURE — 99232 SBSQ HOSP IP/OBS MODERATE 35: CPT | Performed by: NURSE PRACTITIONER

## 2020-09-08 RX ORDER — DIVALPROEX SODIUM 250 MG/1
250 TABLET, DELAYED RELEASE ORAL EVERY 12 HOURS SCHEDULED
Status: DISCONTINUED | OUTPATIENT
Start: 2020-09-08 | End: 2020-09-10 | Stop reason: HOSPADM

## 2020-09-08 RX ORDER — LAMOTRIGINE 25 MG/1
25 TABLET ORAL DAILY
Status: CANCELLED | OUTPATIENT
Start: 2020-10-06 | End: 2020-10-20

## 2020-09-08 RX ORDER — ONDANSETRON 2 MG/ML
4 INJECTION INTRAMUSCULAR; INTRAVENOUS EVERY 4 HOURS PRN
Status: DISCONTINUED | OUTPATIENT
Start: 2020-09-08 | End: 2020-09-10 | Stop reason: HOSPADM

## 2020-09-08 RX ORDER — DIVALPROEX SODIUM 250 MG/1
250 TABLET, DELAYED RELEASE ORAL DAILY
Status: DISCONTINUED | OUTPATIENT
Start: 2020-09-12 | End: 2020-09-10 | Stop reason: HOSPADM

## 2020-09-08 RX ORDER — LAMOTRIGINE 25 MG/1
25 TABLET ORAL DAILY
Status: CANCELLED | OUTPATIENT
Start: 2020-09-08 | End: 2020-09-22

## 2020-09-08 RX ORDER — LAMOTRIGINE 100 MG/1
TABLET ORAL
Qty: 60 TABLET | Refills: 4 | Status: ON HOLD | OUTPATIENT
Start: 2020-09-08 | End: 2021-04-02 | Stop reason: SDUPTHER

## 2020-09-08 RX ORDER — LAMOTRIGINE 25 MG/1
TABLET ORAL
Qty: 392 TABLET | Refills: 3 | Status: ON HOLD | OUTPATIENT
Start: 2020-09-09 | End: 2021-04-02

## 2020-09-08 RX ORDER — LAMOTRIGINE 25 MG/1
25 TABLET ORAL 2 TIMES DAILY
Status: CANCELLED | OUTPATIENT
Start: 2020-09-22 | End: 2020-10-06

## 2020-09-08 RX ORDER — LAMOTRIGINE 100 MG/1
100 TABLET ORAL 2 TIMES DAILY
Status: CANCELLED | OUTPATIENT
Start: 2020-12-15

## 2020-09-08 RX ORDER — LAMOTRIGINE 25 MG/1
50 TABLET ORAL DAILY
Status: DISCONTINUED | OUTPATIENT
Start: 2020-09-08 | End: 2020-09-08

## 2020-09-08 RX ORDER — DIVALPROEX SODIUM 250 MG/1
TABLET, DELAYED RELEASE ORAL
Qty: 9 TABLET | Refills: 0 | Status: ON HOLD | OUTPATIENT
Start: 2020-09-08 | End: 2021-04-02

## 2020-09-08 RX ORDER — LAMOTRIGINE 25 MG/1
75 TABLET ORAL DAILY
Status: CANCELLED | OUTPATIENT
Start: 2020-12-01 | End: 2020-12-15

## 2020-09-08 RX ORDER — SODIUM CHLORIDE 0.9 % (FLUSH) 0.9 %
10 SYRINGE (ML) INJECTION ONCE
Status: DISCONTINUED | OUTPATIENT
Start: 2020-09-08 | End: 2020-09-10 | Stop reason: HOSPADM

## 2020-09-08 RX ORDER — LAMOTRIGINE 25 MG/1
25 TABLET ORAL DAILY
Status: DISCONTINUED | OUTPATIENT
Start: 2020-09-08 | End: 2020-09-10 | Stop reason: HOSPADM

## 2020-09-08 RX ORDER — LORAZEPAM 2 MG/ML
2 INJECTION INTRAMUSCULAR ONCE
Status: DISCONTINUED | OUTPATIENT
Start: 2020-09-08 | End: 2020-09-10 | Stop reason: HOSPADM

## 2020-09-08 RX ORDER — LAMOTRIGINE 25 MG/1
75 TABLET ORAL 2 TIMES DAILY
Status: CANCELLED | OUTPATIENT
Start: 2020-11-17 | End: 2020-12-01

## 2020-09-08 RX ORDER — LAMOTRIGINE 25 MG/1
50 TABLET ORAL DAILY
Status: CANCELLED | OUTPATIENT
Start: 2020-09-08

## 2020-09-08 RX ORDER — MECLIZINE HYDROCHLORIDE 25 MG/1
25 TABLET ORAL 3 TIMES DAILY PRN
Qty: 45 TABLET | Refills: 1 | Status: SHIPPED | OUTPATIENT
Start: 2020-09-08 | End: 2020-09-18

## 2020-09-08 RX ORDER — LAMOTRIGINE 25 MG/1
50 TABLET ORAL DAILY
Status: CANCELLED | OUTPATIENT
Start: 2020-10-20 | End: 2020-11-17

## 2020-09-08 RX ADMIN — PROPRANOLOL HYDROCHLORIDE 80 MG: 40 TABLET ORAL at 12:24

## 2020-09-08 RX ADMIN — OXYCODONE HYDROCHLORIDE 5 MG: 5 TABLET ORAL at 21:16

## 2020-09-08 RX ADMIN — MECLIZINE HYDROCHLORIDE 25 MG: 12.5 TABLET, FILM COATED ORAL at 21:15

## 2020-09-08 RX ADMIN — ACETAMINOPHEN 650 MG: 325 TABLET ORAL at 12:29

## 2020-09-08 RX ADMIN — LAMOTRIGINE 25 MG: 25 TABLET ORAL at 21:18

## 2020-09-08 RX ADMIN — MECLIZINE HYDROCHLORIDE 25 MG: 12.5 TABLET, FILM COATED ORAL at 12:23

## 2020-09-08 RX ADMIN — SERTRALINE 50 MG: 50 TABLET, FILM COATED ORAL at 12:24

## 2020-09-08 RX ADMIN — DIVALPROEX SODIUM 250 MG: 250 TABLET, DELAYED RELEASE ORAL at 21:15

## 2020-09-08 RX ADMIN — BUSPIRONE HYDROCHLORIDE 15 MG: 15 TABLET ORAL at 12:23

## 2020-09-08 RX ADMIN — SODIUM CHLORIDE, PRESERVATIVE FREE 10 ML: 5 INJECTION INTRAVENOUS at 21:16

## 2020-09-08 RX ADMIN — BUSPIRONE HYDROCHLORIDE 15 MG: 15 TABLET ORAL at 21:15

## 2020-09-08 RX ADMIN — OXYCODONE HYDROCHLORIDE 5 MG: 5 TABLET ORAL at 12:28

## 2020-09-08 RX ADMIN — DIVALPROEX SODIUM 500 MG: 500 TABLET, DELAYED RELEASE ORAL at 12:24

## 2020-09-08 RX ADMIN — SODIUM CHLORIDE, PRESERVATIVE FREE 10 ML: 5 INJECTION INTRAVENOUS at 12:30

## 2020-09-08 RX ADMIN — PROPRANOLOL HYDROCHLORIDE 80 MG: 40 TABLET ORAL at 21:16

## 2020-09-08 RX ADMIN — AMITRIPTYLINE HYDROCHLORIDE 100 MG: 50 TABLET, FILM COATED ORAL at 21:15

## 2020-09-08 RX ADMIN — GADOTERIDOL 15 ML: 279.3 INJECTION, SOLUTION INTRAVENOUS at 11:08

## 2020-09-08 ASSESSMENT — PAIN SCALES - GENERAL
PAINLEVEL_OUTOF10: 8
PAINLEVEL_OUTOF10: 8

## 2020-09-08 NOTE — PROCEDURES
EEG REPORT       Patient: Wanda Kitchen Age: 48 y.o. MRN: [id number]    Date: 9/6/2020  Referring Provider: No ref. provider found    History: This routine 30 minute scalp EEG was recorded with video- monitoring for a 48 y.o.. male  who presented with vertigo. This EEG was performed to evaluate for focal and epileptiform abnormalities.      Chemo Yee   Current Facility-Administered Medications   Medication Dose Route Frequency Provider Last Rate Last Dose    ondansetron (ZOFRAN) injection 4 mg  4 mg Intravenous Q4H PRN Ruth Chanel MD        LORazepam (ATIVAN) injection 2 mg  2 mg Intravenous Once Ruth Chanel MD        sodium chloride flush 0.9 % injection 10 mL  10 mL Intravenous Once Tootie Balderrama MD        0.9 % sodium chloride infusion   Intravenous Continuous Tootie Balderrama MD        meclizine (ANTIVERT) tablet 25 mg  25 mg Oral TID Tootie Balderrama MD   25 mg at 09/07/20 2208    diphenhydrAMINE (BENADRYL) tablet 25 mg  25 mg Oral Q8H PRN Tootie Balderrama MD   25 mg at 09/07/20 1313    busPIRone (BUSPAR) tablet 15 mg  15 mg Oral TID Starling Delay, MD   15 mg at 09/07/20 2208    propranolol (INDERAL) tablet 80 mg  80 mg Oral TID Starling Delay, MD   80 mg at 09/07/20 2207    sertraline (ZOLOFT) tablet 50 mg  50 mg Oral Daily Starling Delay, MD   50 mg at 09/07/20 0737    sodium chloride flush 0.9 % injection 10 mL  10 mL Intravenous 2 times per day Staroscar Ragland MD   10 mL at 09/07/20 2207    sodium chloride flush 0.9 % injection 10 mL  10 mL Intravenous PRN Starling MD Ellyn        acetaminophen (TYLENOL) tablet 650 mg  650 mg Oral Q4H PRN Starling MD Ellyn        enoxaparin (LOVENOX) injection 40 mg  40 mg Subcutaneous Daily Starling MD Ellyn   40 mg at 09/07/20 0737    0.9 % sodium chloride infusion   Intravenous Continuous Elia Madera  mL/hr at 09/06/20 1656      amitriptyline (ELAVIL) tablet 100 mg  100 mg Oral Nightly Elia Madera MD   100 mg at 09/07/20 2207    oxyCODONE (ROXICODONE) immediate release tablet 5 mg  5 mg Oral Q6H PRN Elia Madera MD        divalproex (DEPAKOTE) DR tablet 500 mg  500 mg Oral BID Elia Madera MD   500 mg at 09/07/20 2207        Technical Description: This is a 21 channel digital EEG recording with time-locked video. Electrodes were placed in accordance with the 10-20 International System of Electrode Placement. Single lead EKG monitoring as well as temporal electrodes were included. The patient was not sleep deprived. This recording was obtained during wakefulness and drowsiness. EEG Description: The dominant background activity during maximal recorded wakefulness consisted of bioccipitally dominant 9-10 Hz, 25-35 uV symmetric, regular activity that was reactive to eye opening. Occasional left posterior quadrant polymorphic delta and theta slowing of 3 to 5 Hz was seen lasting for 2 to 3 seconds. During drowsiness, roving eye movements were seen. Deeper sleep was not seen. Photic stimulation - stepwise photic stimulation at 2-20 Hz was performed and there was no driving response. Hyperventilation - was not preformed. No abnormalities were activated by photic stimulation     The EKG channel demonstrated a normal sinus rhythm. Interpretation  This EEG was abnormal in wakefulness and drowsiness. Occasional left posterior quadrant polymorphic delta theta slowing was seen. Clinical correlation  This EEG was abnormal.  Occasional left posterior quadrant polymorphic delta and theta slowing suggested underlying neuronal dysfunction. Giulia Mayfield MD    I have discussed the EEG of Wanda Imtiaz with the resident. Raphael Wise MD  Neurology    This note is created with the assistance of a speech-recognition program. While intending to generate a document that actually reflects the content of the visit, the document can still have some errors including those of syntax and sound a- like substitutions which may escape proofreading. In such instances, actual meaning can be extrapolated by contextual derivation.
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
## 2020-09-08 NOTE — PROGRESS NOTES
OBS/CDU   RESIDENT NOTE      Patients PCP is: No primary care provider on file. SUBJECTIVE      Patient reports his vertiginous symptoms from yesterday have improved overall. He has been able to tolerate a full diet, has not fallen since yesterday's episode on the way to the restroom. He does endorse a right-sided headache, which has been present since he got here. Numbness, tingling, nausea, chest pain, dyspnea, abdominal pain, numbness, tingling. PHYSICAL EXAM      General: NAD, AO X 3  Heent: EMOI, PERRL  Neck: SUPPLE, NO JVD  Cardiovascular: RRR, S1S2  Pulmonary: CTAB, NO SOB  Abdomen: SOFT, NTTP, ND, +BS  Extremities: +2/4 PULSES DISTAL, NO SWELLING  Neuro / Psych: NO NUMBNESS OR TINGLING, MENTATION AT BASELINE; minor horizontal nystagmus upon sitting up from lying down. Patient denied lightheadedness, dizziness at that time    PERTINENT TEST Malik Pulliam I have reviewed all available laboratory results. MEDICATIONS CURRENT   ondansetron (ZOFRAN) injection 4 mg, Q4H PRN  LORazepam (ATIVAN) injection 2 mg, Once  0.9 % sodium chloride infusion, Continuous  meclizine (ANTIVERT) tablet 25 mg, TID  diphenhydrAMINE (BENADRYL) tablet 25 mg, Q8H PRN  busPIRone (BUSPAR) tablet 15 mg, TID  propranolol (INDERAL) tablet 80 mg, TID  sertraline (ZOLOFT) tablet 50 mg, Daily  sodium chloride flush 0.9 % injection 10 mL, 2 times per day  sodium chloride flush 0.9 % injection 10 mL, PRN  acetaminophen (TYLENOL) tablet 650 mg, Q4H PRN  enoxaparin (LOVENOX) injection 40 mg, Daily  0.9 % sodium chloride infusion, Continuous  amitriptyline (ELAVIL) tablet 100 mg, Nightly  oxyCODONE (ROXICODONE) immediate release tablet 5 mg, Q6H PRN  divalproex (DEPAKOTE) DR tablet 500 mg, BID        All medication charted and reviewed. CONSULTS      IP CONSULT TO NEUROLOGY    ASSESSMENT/PLAN       Lisa Lynn is a 48 y.o. male who presents with acute seizure due to unclear etiology.   Patient has been compliant with his Depakote, which was at the therapeutic level. Yesterday, he complained of vertigo, however this is since resolved. PACO has resolved as well. Patient is eating and drinking normally. · Neurology following, appreciate recommendations. · Patient to have MRI and EEG today. Ativan ordered as needed for MRI  · Zofran ordered in case patient becomes nauseated with vertigo. · Vertigo is controlled with Antivert, however will consider adding scopolamine or Valium as needed  · Continue home medications and pain control  · Monitor vitals, labs, and imaging  · DISPO: pending consults and clinical improvement    --  [facility]  Emergency Medicine Resident Physician     This dictation was generated by voice recognition computer software. Although all attempts are made to edit the dictation for accuracy, there may be errors in the transcription that are not intended.

## 2020-09-08 NOTE — PROGRESS NOTES
Physical Therapy  DATE: 2020    NAME: Marlowe Sacks  MRN: [id number]   : 1965    Patient not seen this date for Physical Therapy due to:  [] Blood transfusion in progress  [] Hemodialysis  []  Patient Declined  [] Spine Precautions   [] Strict Bedrest  [] Surgery/ Procedure  [x] Testing: Pt off unit at EEG. PT will check back as time allows or 20. [] Other        [] PT being discontinued at this time. Patient independent. No further needs. [] PT being discontinued at this time as the patient has been transferred to palliative care. No further needs.     Deanne Magallanes, PT

## 2020-09-08 NOTE — DISCHARGE INSTR - LAB
Pateint will benefit from seeing an ENT as outpatient for his vertigo. Also needs hearing test done.

## 2020-09-08 NOTE — FLOWSHEET NOTE
Assessment: This is a 48 y.o. male who presented to the Emergency Department with complaint of dizzy, severe headache, seizure, fall. Patient stated he recently \"fell out\". Charisse Pruitt today also had a seizure again falling hitting the back of his head. He also complains of dizziness. Intervention:  was rounding on floor. Pt was open to spiritual care.  offered words of hope and courage.  was bedside ministry of presence. Outcome: Pt was comforted and given courage.  will follow up as needed during hospital stay.         09/07/20 0705   Encounter Summary   Services provided to: Patient   Referral/Consult From: [facility] Family members   Continue Visiting   (9/7/20)   Complexity of Encounter Low   Length of Encounter 15 minutes   Spiritual Assessment Completed Yes   Spiritual/Hoahaoism   Type Spiritual support   Assessment Approachable   Intervention Nurtured hope   Outcome Comfort

## 2020-09-08 NOTE — PROGRESS NOTES
[facility]  CDU / OBSERVATION eNCOUnter  Attending NOte       I performed a history and physical examination of the patient and discussed management with the resident. I reviewed the residents note and agree with the documented findings and plan of care. Any areas of disagreement are noted on the chart. I was personally present for the key portions of any procedures. I have documented in the chart those procedures where I was not present during the key portions. I have reviewed the nurses notes. I agree with the chief complaint, past medical history, past surgical history, allergies, medications, social and family history as documented unless otherwise noted below. The Family history, social history, and ROS are effectively unchanged since admission unless noted elsewhere in the chart. Patient with breakthrough seizures and significant vertigo. Patient had vertigo to the point yesterday that he was unable to lay in bed without being symptomatic. Patient was nonambulatory as a result. Today the patient is doing better. Patient has no truncal ataxia and is able to sit up in bed with only mild nystagmus noted. Patient has been evaluated by neurology. Appreciate their involvement. Patient for ongoing testing today. Patient for aggressive seizure and vertigo management. Imaging to be done. MRI pending. Will make decisions along with neurology regarding any changes in medications. Currently patient will have benzodiazepines added as needed and continue the scheduled Antivert. Patient will need ongoing PT OT and will require a aide or assistant when up and about as he is still having difficulty on his feet. Renal function improved. Creatinine back to baseline.     Byron Mckeon MD  Attending Emergency  Physician

## 2020-09-08 NOTE — PROGRESS NOTES
Occupational Therapy Not Seen Note    DATE: 2020  Name: Manisha Arias  : 1965  MRN: [id number]    Patient not available for Occupational Therapy due to:     Other: EEG this AM. OT to chk PM as time allows    Next Scheduled Treatment: PM or 2020    Electronically signed by LESLY Mcclure on 2020 at 10:24 AM

## 2020-09-09 PROCEDURE — 97535 SELF CARE MNGMENT TRAINING: CPT

## 2020-09-09 PROCEDURE — 6370000000 HC RX 637 (ALT 250 FOR IP): Performed by: STUDENT IN AN ORGANIZED HEALTH CARE EDUCATION/TRAINING PROGRAM

## 2020-09-09 PROCEDURE — 97166 OT EVAL MOD COMPLEX 45 MIN: CPT

## 2020-09-09 PROCEDURE — 1200000000 HC SEMI PRIVATE

## 2020-09-09 PROCEDURE — 6370000000 HC RX 637 (ALT 250 FOR IP): Performed by: EMERGENCY MEDICINE

## 2020-09-09 PROCEDURE — 99232 SBSQ HOSP IP/OBS MODERATE 35: CPT | Performed by: PSYCHIATRY & NEUROLOGY

## 2020-09-09 PROCEDURE — 2580000003 HC RX 258: Performed by: STUDENT IN AN ORGANIZED HEALTH CARE EDUCATION/TRAINING PROGRAM

## 2020-09-09 PROCEDURE — 97162 PT EVAL MOD COMPLEX 30 MIN: CPT

## 2020-09-09 PROCEDURE — 97530 THERAPEUTIC ACTIVITIES: CPT

## 2020-09-09 PROCEDURE — G0378 HOSPITAL OBSERVATION PER HR: HCPCS

## 2020-09-09 PROCEDURE — 96361 HYDRATE IV INFUSION ADD-ON: CPT

## 2020-09-09 PROCEDURE — 6370000000 HC RX 637 (ALT 250 FOR IP): Performed by: FAMILY MEDICINE

## 2020-09-09 PROCEDURE — 6370000000 HC RX 637 (ALT 250 FOR IP): Performed by: NURSE PRACTITIONER

## 2020-09-09 PROCEDURE — 6370000000 HC RX 637 (ALT 250 FOR IP): Performed by: HEALTH CARE PROVIDER

## 2020-09-09 RX ORDER — SCOLOPAMINE TRANSDERMAL SYSTEM 1 MG/1
1 PATCH, EXTENDED RELEASE TRANSDERMAL
Status: DISCONTINUED | OUTPATIENT
Start: 2020-09-09 | End: 2020-09-10 | Stop reason: HOSPADM

## 2020-09-09 RX ADMIN — ACETAMINOPHEN 650 MG: 325 TABLET ORAL at 15:04

## 2020-09-09 RX ADMIN — MECLIZINE HYDROCHLORIDE 25 MG: 12.5 TABLET, FILM COATED ORAL at 08:45

## 2020-09-09 RX ADMIN — LAMOTRIGINE 25 MG: 25 TABLET ORAL at 08:45

## 2020-09-09 RX ADMIN — DIVALPROEX SODIUM 250 MG: 250 TABLET, DELAYED RELEASE ORAL at 08:45

## 2020-09-09 RX ADMIN — BUSPIRONE HYDROCHLORIDE 15 MG: 15 TABLET ORAL at 08:45

## 2020-09-09 RX ADMIN — AMITRIPTYLINE HYDROCHLORIDE 100 MG: 50 TABLET, FILM COATED ORAL at 21:48

## 2020-09-09 RX ADMIN — MECLIZINE HYDROCHLORIDE 25 MG: 12.5 TABLET, FILM COATED ORAL at 15:03

## 2020-09-09 RX ADMIN — MECLIZINE HYDROCHLORIDE 25 MG: 12.5 TABLET, FILM COATED ORAL at 21:49

## 2020-09-09 RX ADMIN — BUSPIRONE HYDROCHLORIDE 15 MG: 15 TABLET ORAL at 21:49

## 2020-09-09 RX ADMIN — PROPRANOLOL HYDROCHLORIDE 80 MG: 40 TABLET ORAL at 21:49

## 2020-09-09 RX ADMIN — ACETAMINOPHEN 650 MG: 325 TABLET ORAL at 08:47

## 2020-09-09 RX ADMIN — PROPRANOLOL HYDROCHLORIDE 80 MG: 40 TABLET ORAL at 15:03

## 2020-09-09 RX ADMIN — OXYCODONE HYDROCHLORIDE 5 MG: 5 TABLET ORAL at 15:04

## 2020-09-09 RX ADMIN — OXYCODONE HYDROCHLORIDE 5 MG: 5 TABLET ORAL at 21:49

## 2020-09-09 RX ADMIN — SODIUM CHLORIDE, PRESERVATIVE FREE 10 ML: 5 INJECTION INTRAVENOUS at 08:46

## 2020-09-09 RX ADMIN — SERTRALINE 50 MG: 50 TABLET, FILM COATED ORAL at 08:44

## 2020-09-09 RX ADMIN — SODIUM CHLORIDE, PRESERVATIVE FREE 10 ML: 5 INJECTION INTRAVENOUS at 21:52

## 2020-09-09 RX ADMIN — PROPRANOLOL HYDROCHLORIDE 80 MG: 40 TABLET ORAL at 08:45

## 2020-09-09 RX ADMIN — DIVALPROEX SODIUM 250 MG: 250 TABLET, DELAYED RELEASE ORAL at 21:51

## 2020-09-09 RX ADMIN — OXYCODONE HYDROCHLORIDE 5 MG: 5 TABLET ORAL at 08:46

## 2020-09-09 ASSESSMENT — PAIN DESCRIPTION - PROGRESSION

## 2020-09-09 ASSESSMENT — PAIN DESCRIPTION - FREQUENCY: FREQUENCY: CONTINUOUS

## 2020-09-09 ASSESSMENT — PAIN DESCRIPTION - PAIN TYPE: TYPE: ACUTE PAIN

## 2020-09-09 ASSESSMENT — PAIN SCALES - GENERAL
PAINLEVEL_OUTOF10: 8
PAINLEVEL_OUTOF10: 7
PAINLEVEL_OUTOF10: 8
PAINLEVEL_OUTOF10: 7
PAINLEVEL_OUTOF10: 8

## 2020-09-09 ASSESSMENT — PAIN DESCRIPTION - ORIENTATION
ORIENTATION: RIGHT;LOWER
ORIENTATION: RIGHT

## 2020-09-09 ASSESSMENT — PAIN DESCRIPTION - LOCATION
LOCATION: HEAD
LOCATION: HEAD;BACK;KNEE

## 2020-09-09 ASSESSMENT — PAIN DESCRIPTION - DESCRIPTORS
DESCRIPTORS: HEADACHE;SORE
DESCRIPTORS: HEADACHE

## 2020-09-09 ASSESSMENT — PAIN DESCRIPTION - ONSET: ONSET: ON-GOING

## 2020-09-09 NOTE — PROGRESS NOTES
Alba Gardiner on 9/9/2020 at 1:44 PM      Britney Escoto, [address] [facility]

## 2020-09-09 NOTE — PROGRESS NOTES
Occupational Therapy   Occupational Therapy Initial Assessment  Date: 2020   Patient Name: Manisha Arias  MRN: [id number]     : 1965    Date of Service: 2020    Discharge Recommendations:  Patient would benefit from continued therapy after discharge  OT Equipment Recommendations  Equipment Needed: Yes  Mobility Devices: ADL Assistive Devices  ADL Assistive Devices: Shower Chair with back    Assessment   Performance deficits / Impairments: Decreased functional mobility ; Decreased safe awareness;Decreased balance;Decreased ADL status; Decreased high-level IADLs;Decreased posture;Decreased coordination  Assessment: Patient demonstrates decreased balance, affecting performance and safety of ADLs and functional transfers in standing d/t posterior lean, increasing fall risk. Pt demonstrates posterior lean upon standing and Min - CGA with functional mobility, improving balance with use of RW for mobility tasks. OT services are warranted to address functional deficits impacting performance and safety with ADLs. Prognosis: Good  Decision Making: Medium Complexity  Patient Education: OT role, OT POC, purpose of evaluation, use of DME to increase safety, need for assistance d/t balance deficits, increasing fall risk and further injury d/t deficits - good return  REQUIRES OT FOLLOW UP: Yes  Activity Tolerance  Activity Tolerance: Patient Tolerated treatment well  Safety Devices  Safety Devices in place: Yes  Type of devices: All fall risk precautions in place;Gait belt;Patient at risk for falls; Left in bed;Nurse notified;Call light within reach  Restraints  Initially in place: No           Patient Diagnosis(es): The primary encounter diagnosis was Dizziness. Diagnoses of Prolonged Q-T interval on ECG and Acute renal failure, unspecified acute renal failure type [facility]) were also pertinent to this visit. has a past medical history of Hep B w/ coma and Seizures ([facility] [address]    has a past surgical history that includes hernia repair and back surgery. Restrictions  Restrictions/Precautions  Restrictions/Precautions: Up as Tolerated  Required Braces or Orthoses?: No  Position Activity Restriction  Other position/activity restrictions: Up with assist; Vertigo    Subjective   General  Patient assessed for rehabilitation services?: Yes  Family / Caregiver Present: No  General Comment  Comments: RN ok'd patient for OT/PT evaluation. Pt pleasant and cooperative. Patient Currently in Pain: Yes  Pain Assessment  Pain Assessment: 0-10  Pain Level: 8  Pain Location: Head;Back;Knee  Pain Orientation: Right; Lower  Pain Descriptors: Headache  Non-Pharmaceutical Pain Intervention(s): Ambulation/Increased Activity; Distraction; Therapeutic presence  Response to Pain Intervention: Patient Satisfied  Multiple Pain Sites: Yes(Back and R knee)  Vital Signs  Temp: 97.5 °F (36.4 °C)  Temp Source: Oral  Pulse: 62  Heart Rate Source: Monitor  Resp: 16  BP: 108/70  Patient Currently in Pain: Yes  Oxygen Therapy  SpO2: 98 %  Social/Functional History  Social/Functional History  Type of Home: Homeless(about a month and half)  Home Layout: One level  Home Access: Stairs to enter without rails  Entrance Stairs - Number of Steps: 1  Bathroom Shower/Tub: Walk-in shower  Bathroom Toilet: Standard  Home Equipment: (used a cane periodically as needed)  ADL Assistance: Independent  Homemaking Assistance: Independent  Homemaking Responsibilities: Yes  Meal Prep Responsibility: No  Laundry Responsibility: Primary  Cleaning Responsibility: Primary  Shopping Responsibility: Primary  Ambulation Assistance: Independent  Transfer Assistance: Independent  Active : No  Mode of Transportation: Cab, Bus  Occupation: Unemployed  Type of occupation:   Leisure & Hobbies: was taking care of wife, now watches TV       Objective   Vision: Impaired  Vision Exceptions: Wears glasses for reading(reports starting to have glasses for reading)  Hearing: Within Evaluation, Education, & procurement    AM-PAC Score        AM-PAC Inpatient Daily Activity Raw Score: 20 (09/09/20 0918)  AM-PAC Inpatient ADL T-Scale Score : 42.03 (09/09/20 4164)  ADL Inpatient CMS 0-100% Score: 38.32 (09/09/20 3684)  ADL Inpatient CMS G-Code Modifier : Elisabeth Raza (09/09/20 0014)    Goals  Short term goals  Time Frame for Short term goals: Patient will, by discharge  Short term goal 1: demo ADLs at Mod I using AE PRN  Short term goal 2: demo 8+ min of dynamic standing balance at Supervision using LRD to engage in ADLs safely  Short term goal 3: demo functional transfers/mobility at Supervision using LRD to engage in ADLs safely  Short term goal 4: demo 25+ min of functional activity tolerance using LRD implementing EC/WS techs <1 cue       Therapy Time   Individual Concurrent Group Co-treatment   Time In 0824         Time Out 0845         Minutes 21         Timed Code Treatment Minutes: 8 Minutes       Clemente Carpio, OTR/L

## 2020-09-09 NOTE — PROGRESS NOTES
OBS/CDU   RESIDENT NOTE      Patients PCP is: No primary care provider on file. SUBJECTIVE      Patient requested to stay in the observation unit another night due to desire to speak with neurologist regarding EEG findings. Reports that he no longer feels dizzy and feels confident in his ability to discharge today after this conversation. No acute events overnight. Has been able to tolerate a full diet without nausea or vomiting. The patient is urinating on his own and is passing flatus. Denies fever, chills, nausea, vomiting, chest pain, shortness of breath, abdominal pain, focal weakness, numbness, tingling, urinary/bowel symptoms, vision changes, visual hallucinations, or headache. PHYSICAL EXAM      General: NAD, AO X 3  Heent: EMOI, PERRL  Neck: SUPPLE, NO JVD  Cardiovascular: RRR, S1S2  Pulmonary: CTAB, NO SOB  Abdomen: SOFT, NTTP, ND, +BS  Extremities: +2/4 PULSES DISTAL, NO SWELLING  Neuro / Psych: NO NUMBNESS OR TINGLING, MENTATION AT BASELINE    PERTINENT TEST /EXAMS      I have reviewed all available laboratory results.     MEDICATIONS CURRENT   ondansetron (ZOFRAN) injection 4 mg, Q4H PRN  LORazepam (ATIVAN) injection 2 mg, Once  sodium chloride flush 0.9 % injection 10 mL, Once  divalproex (DEPAKOTE) DR tablet 250 mg, 2 times per day    Followed by  [START ON 9/12/2020] divalproex (DEPAKOTE) DR tablet 250 mg, Daily  lamoTRIgine (LAMICTAL) tablet 25 mg, Daily  0.9 % sodium chloride infusion, Continuous  meclizine (ANTIVERT) tablet 25 mg, TID  diphenhydrAMINE (BENADRYL) tablet 25 mg, Q8H PRN  busPIRone (BUSPAR) tablet 15 mg, TID  propranolol (INDERAL) tablet 80 mg, TID  sertraline (ZOLOFT) tablet 50 mg, Daily  sodium chloride flush 0.9 % injection 10 mL, 2 times per day  sodium chloride flush 0.9 % injection 10 mL, PRN  acetaminophen (TYLENOL) tablet 650 mg, Q4H PRN  enoxaparin (LOVENOX) injection 40 mg, Daily  0.9 % sodium chloride infusion, Continuous  amitriptyline (ELAVIL) tablet 100 mg, Nightly  oxyCODONE (ROXICODONE) immediate release tablet 5 mg, Q6H PRN        All medication charted and reviewed. CONSULTS      IP CONSULT TO NEUROLOGY    ASSESSMENT/PLAN       Cr Mccollum is a 48 y.o. male who presents with breakthrough seizure, vertigo. Vertigo has since resolved. Patient had EEG and MRI yesterday which were unremarkable. He does still feel unsteady on his feet. · PT/OT eval.  Appreciate recommendations  · Neurology to see patient and officially sign out today. · Probable discharge today  · Continue home medications and pain control  · Monitor vitals, labs, and imaging  · DISPO: pending consults and clinical improvement    --  Timmy Guallpa  Emergency Medicine Resident Physician     This dictation was generated by voice recognition computer software. Although all attempts are made to edit the dictation for accuracy, there may be errors in the transcription that are not intended.

## 2020-09-09 NOTE — PROGRESS NOTES
History  Type of Home: Homeless(about a month and half)  Home Layout: One level  Home Access: Stairs to enter without rails  Entrance Stairs - Number of Steps: 1  Bathroom Shower/Tub: Walk-in shower  Bathroom Toilet: Standard  Home Equipment: (used a cane periodically as needed)  ADL Assistance: [address]: Independent  Homemaking Responsibilities: Yes  Meal Prep Responsibility: No  Laundry Responsibility: Primary  Cleaning Responsibility: Primary  Shopping Responsibility: Primary  Ambulation Assistance: Independent  Transfer Assistance: Independent  Active : No  Mode of Transportation: Cab, Bus  Occupation: Unemployed  Type of occupation:   Leisure & Hobbies: was taking care of wife, now watches TV  Cognition      Objective    AROM RLE (degrees)  RLE AROM: WNL  AROM LLE (degrees)  LLE AROM : WNL  AROM RUE (degrees)  RUE AROM : WNL  AROM LUE (degrees)  LUE AROM : WNL  Strength RLE  Strength RLE: WNL  Strength LLE  Strength LLE: WNL  Strength RUE  Strength RUE: WNL  Strength LUE  Strength LUE: WNL     Sensation  Overall Sensation Status: Impaired  Bed mobility  Supine to Sit: Stand by assistance  Sit to Supine: Stand by assistance  Scooting: Stand by assistance  Transfers  Sit to Stand: Contact guard assistance  Stand to sit: Contact guard assistance  Ambulation  Ambulation?: Yes  Ambulation 1  Device: No Device  Assistance: Minimal assistance  Gait Deviations: Slow Silke;Deviated path;Staggers  Distance: amb 100 ft without a device x min assist   amb 50 ft with a RW x CGA  Balance  Posture: Good  Sitting - Static: Good  Sitting - Dynamic: Good  Standing - Static: Fair  Standing - Dynamic: [address]  Times per week: 5-6x wk  Current Treatment Recommendations: Strengthening, Safety Education & Training, Endurance Training, Balance Training, Functional Mobility Training, Gait Training, Stair training  Safety Devices  Type of devices: Nurse notified, Call light within reach, Left in bed    G-Code     OutComes Score          AM-PAC Score  AM-PAC Inpatient Mobility Raw Score : 19 (09/09/20 0855)  AM-PAC Inpatient T-Scale Score : 45.44 (09/09/20 0855)  Mobility Inpatient CMS 0-100% Score: 41.77 (09/09/20 0855)  Mobility Inpatient CMS G-Code Modifier : CK (09/09/20 0855)  Goals  Short term goals  Time Frame for Short term goals: 10 visits  Short term goal 1: transfers with SBA  Short term goal 2: amb 250 ft with or without a device x SBA  Short term goal 3: ascend/descend 4 steps with SBA  Short term goal 4: exercise program x SBA  Patient Goals   Patient goals :  To walk safely     Therapy Time   Individual Concurrent Group Co-treatment   Time In 0815         Time Out 0840         Minutes 25             1 of [address] [place] [address] & [facility], PT

## 2020-09-09 NOTE — PROGRESS NOTES
[facility]  CDU / OBSERVATION eNCOUnter  Attending NOte       I performed a history and physical examination of the patient and discussed management with the resident. I reviewed the residents note and agree with the documented findings and plan of care. Any areas of disagreement are noted on the chart. I was personally present for the key portions of any procedures. I have documented in the chart those procedures where I was not present during the key portions. I have reviewed the nurses notes. I agree with the chief complaint, past medical history, past surgical history, allergies, medications, social and family history as documented unless otherwise noted below. The Family history, social history, and ROS are effectively unchanged since admission unless noted elsewhere in the chart. Patient not felt safe to return to homeless shelter given social situation and has difficulty with ambulation. Patient is improved however and on ambulating him today I have reason to believe that he will be ambulatory and safe for discharge to the shelter tomorrow. Patient is currently with some improved symptoms but still unsteady on his feet. Trial of ambulation in the observation unit showed that he was not quite safe but improved from presentation. Will reevaluate tomorrow.   If patient still unsafe will have further discussion regarding ECF placement    Jeffry Johnson MD  Attending Emergency  Physician

## 2020-09-10 VITALS
SYSTOLIC BLOOD PRESSURE: 119 MMHG | RESPIRATION RATE: 16 BRPM | HEART RATE: 67 BPM | HEIGHT: 70 IN | WEIGHT: 175 LBS | DIASTOLIC BLOOD PRESSURE: 70 MMHG | BODY MASS INDEX: 25.05 KG/M2 | TEMPERATURE: 98 F | OXYGEN SATURATION: 95 %

## 2020-09-10 PROCEDURE — 96361 HYDRATE IV INFUSION ADD-ON: CPT

## 2020-09-10 PROCEDURE — 97535 SELF CARE MNGMENT TRAINING: CPT

## 2020-09-10 PROCEDURE — 6370000000 HC RX 637 (ALT 250 FOR IP): Performed by: FAMILY MEDICINE

## 2020-09-10 PROCEDURE — 6370000000 HC RX 637 (ALT 250 FOR IP): Performed by: NURSE PRACTITIONER

## 2020-09-10 PROCEDURE — 6370000000 HC RX 637 (ALT 250 FOR IP): Performed by: STUDENT IN AN ORGANIZED HEALTH CARE EDUCATION/TRAINING PROGRAM

## 2020-09-10 PROCEDURE — 99232 SBSQ HOSP IP/OBS MODERATE 35: CPT | Performed by: NURSE PRACTITIONER

## 2020-09-10 PROCEDURE — G0378 HOSPITAL OBSERVATION PER HR: HCPCS

## 2020-09-10 RX ORDER — OXYCODONE HYDROCHLORIDE 5 MG/1
5 TABLET ORAL EVERY 6 HOURS PRN
Qty: 12 TABLET | Refills: 0 | Status: SHIPPED | OUTPATIENT
Start: 2020-09-10 | End: 2020-09-13

## 2020-09-10 RX ADMIN — SERTRALINE 50 MG: 50 TABLET, FILM COATED ORAL at 08:22

## 2020-09-10 RX ADMIN — DIVALPROEX SODIUM 250 MG: 250 TABLET, DELAYED RELEASE ORAL at 08:23

## 2020-09-10 RX ADMIN — BUSPIRONE HYDROCHLORIDE 15 MG: 15 TABLET ORAL at 08:24

## 2020-09-10 RX ADMIN — LAMOTRIGINE 25 MG: 25 TABLET ORAL at 08:23

## 2020-09-10 RX ADMIN — PROPRANOLOL HYDROCHLORIDE 80 MG: 40 TABLET ORAL at 08:22

## 2020-09-10 RX ADMIN — MECLIZINE HYDROCHLORIDE 25 MG: 12.5 TABLET, FILM COATED ORAL at 08:22

## 2020-09-10 ASSESSMENT — PAIN DESCRIPTION - PROGRESSION

## 2020-09-10 ASSESSMENT — PAIN DESCRIPTION - ORIENTATION: ORIENTATION: RIGHT;LEFT

## 2020-09-10 ASSESSMENT — PAIN DESCRIPTION - LOCATION: LOCATION: BACK;KNEE;WRIST

## 2020-09-10 ASSESSMENT — PAIN SCALES - GENERAL
PAINLEVEL_OUTOF10: 7
PAINLEVEL_OUTOF10: 0

## 2020-09-10 NOTE — PROGRESS NOTES
Occupational Therapy  Facility/Department: [address] MED SURG  Daily Treatment Note  NAME: Zuleyma Jang  : 1965  MRN: [id number]    Date of Service: 9/10/2020    Discharge Recommendations:  Patient would benefit from continued therapy after discharge  OT Equipment Recommendations  ADL Assistive Devices: Shower Chair with back    Assessment   Performance deficits / Impairments: Decreased functional mobility ; Decreased safe awareness;Decreased balance;Decreased ADL status; Decreased high-level IADLs;Decreased posture;Decreased coordination  Assessment: Pt would benefit from continued acute care and post acute care OT to address deficits in ADLs, IADLs, functional mobility, coordination, posture, balance, and safety awareness. Pt would benefit from education on fall prevention and safety awareness. Pt would benefit from increased standing and activity tolerance to improve I in ADLs/functional activities. Prognosis: Good  Decision Making: Medium Complexity  OT Education: OT Role;Plan of Care  Patient Education: pt educated on role of OT, POC, pt demo good understanding  REQUIRES OT FOLLOW UP: Yes  Activity Tolerance  Activity Tolerance: Patient Tolerated treatment well  Safety Devices  Type of devices: Left in bed;Nurse notified;Call light within reach;Gait belt  Restraints  Initially in place: No       Patient Diagnosis(es): The primary encounter diagnosis was Dizziness. Diagnoses of Prolonged Q-T interval on ECG, Acute renal failure, unspecified acute renal failure type ([address] and Pain were also pertinent to this visit. has a past medical history of Hep B w/ coma and Seizures ([address] has a past surgical history that includes hernia repair and back surgery. Restrictions  Restrictions/Precautions  Restrictions/Precautions: Up as Tolerated, General Precautions  Required Braces or Orthoses?: No  Position Activity Restriction  Other position/activity restrictions:  Up with assist     Subjective   General  Patient assessed for rehabilitation services?: Yes  Family / Caregiver Present: No  Pain Assessment  Pain Assessment: 0-10  Pain Level: 7  Pain Location: Back;Knee;Wrist  Pain Orientation: Right;Left  Non-Pharmaceutical Pain Intervention(s): Ambulation/Increased Activity; Distraction;Repositioned  Vital Signs  Patient Currently in Pain: Yes     Orientation  Orientation  Overall Orientation Status: Within Functional Limits     Objective    ADL  Grooming: Independent(pt performed oral hygiene standing at sink)  Additional Comments: Pt supine in bed on OT arrival. Pt performed bed mobility to sit EOB. Pt performed functional mobility into bathroom with no device. Pt performed oral hygiene standing at sink. Pt gathered toiletries standing at sink. Pt performed functional mobility back to EOB to pack up items to prepare for discharge. Pt performed functional mobility into hallway with no device to assess household distances. Pt returned to room and cleaned up space. Pt moved sugar packets from end table to trash. Pt adjust bed linens standing EOB. Pt returned to seated EOB. Pt performed bed mobility to return to supine in bed. Pt left supine, call light within reach and RN notified on OT exit. Balance  Sitting Balance: Supervision(~5 min seated EOB)  Standing Balance: Contact guard assistance  Standing Balance  Time: ~15 min  Activity: functional mobility  Comment: no device, no LOB    Functional Mobility  Functional - Mobility Device: No device  Activity: To/from bathroom; Retrieve items; Other(into hallway to assess household distances)  Assist Level: Contact guard assistance  Functional Mobility Comments: pt demo slight posterior R lean at times, pt demo no LOB, pt demo increased time required to perform    Bed mobility  Rolling to Left: Supervision  Supine to Sit: Supervision  Sit to Supine: Supervision  Scooting: Supervision     Transfers  Stand Step Transfers: Contact guard assistance  Sit to stand: Contact guard assistance  Stand to sit: Contact guard assistance     Cognition  Overall Cognitive Status: Exceptions  Safety Judgement: Decreased awareness of need for assistance;Decreased awareness of need for safety     Perception  Overall Perceptual Status: [facility]     Plan   Plan  Times per week: 3x/wk  Current Treatment Recommendations: Strengthening, Endurance Training, Self-Care / ADL, Patient/Caregiver Education & Training, Balance Training, Functional Mobility Training, Safety Education & Training, Home Management Training, Equipment Evaluation, Education, & procurement    Goals  Short term goals  Time Frame for Short term goals: Patient will, by discharge  Short term goal 1: demo ADLs at Mod I using AE PRN  Short term goal 2: demo 8+ min of dynamic standing balance at Supervision using LRD to engage in ADLs safely  Short term goal 3: demo functional transfers/mobility at Supervision using LRD to engage in ADLs safely  Short term goal 4: demo 25+ min of functional activity tolerance using LRD implementing EC/WS techs <1 cue       Therapy Time   Individual Concurrent Group Co-treatment   Time In 1033         Time Out 1056         Minutes 23         Timed Code Treatment Minutes: 23 Minutes   See above for LOF. RN reports patient is medically stable for therapy treatment this date. Chart reviewed prior to treatment and patient is agreeable for therapy. All lines intact and patient positioned comfortably at end of treatment. All patient needs addressed prior to ending therapy session.       Nadia Tatum, OTS
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
## 2020-09-10 NOTE — PROGRESS NOTES
[facility] [facility]  CDU / OBSERVATION eNCOUnter  Attending NOte       I performed a history and physical examination of the patient and discussed management with the resident. I reviewed the residents note and agree with the documented findings and plan of care. Any areas of disagreement are noted on the chart. I was personally present for the key portions of any procedures. I have documented in the chart those procedures where I was not present during the key portions. I have reviewed the nurses notes. I agree with the chief complaint, past medical history, past surgical history, allergies, medications, social and family history as documented unless otherwise noted below. The Family history, social history, and ROS are effectively unchanged since admission unless noted elsewhere in the chart. Much improved today. Patient states he would like to be discharged from the hospital.  Patient is ambulating well. Patient symptoms are controlled. Patient will like a cane for assistance in ambulation and to prevent falls. Patient otherwise has ability to fill medications and is able to care for himself. Patient for discharge with cane    Kate Samaniego requires a cane due to impaired ambulation and for increased stability in order to participate in or complete daily living tasks of: ambulating. The patient is able to safely use the cane and the functional mobility deficit can be sufficiently resolved.     Elizabeth Mccurdy MD  Attending Emergency  Physician

## 2020-09-10 NOTE — PROGRESS NOTES
OBS/CDU   RESIDENT NOTE      Patients PCP is: No primary care provider on file. SUBJECTIVE      No acute events overnight. Pain is well controlled. Reports significant improvement in his gait and steadiness. Is able to ambulate comfortably without vertigo. PHYSICAL EXAM      General: NAD, AO X 3  Heent: EMOI, PERRL  Neck: SUPPLE, NO JVD  Cardiovascular: RRR, S1S2  Pulmonary: CTAB, NO SOB  Abdomen: SOFT, NTTP, ND, +BS  Extremities: +2/4 PULSES DISTAL, NO SWELLING  Neuro / Psych: Significant improvement in finger-nose-finger test, Romberg test and has no visible tremor. Gait is now much steadier with a very mild ataxia. PERTINENT TEST /EXAMS      I have reviewed all available laboratory results. MEDICATIONS CURRENT   scopolamine (TRANSDERM-SCOP) transdermal patch 1 patch, Q72H  ondansetron (ZOFRAN) injection 4 mg, Q4H PRN  LORazepam (ATIVAN) injection 2 mg, Once  sodium chloride flush 0.9 % injection 10 mL, Once  divalproex (DEPAKOTE) DR tablet 250 mg, 2 times per day    Followed by  [START ON 9/12/2020] divalproex (DEPAKOTE) DR tablet 250 mg, Daily  lamoTRIgine (LAMICTAL) tablet 25 mg, Daily  meclizine (ANTIVERT) tablet 25 mg, TID  diphenhydrAMINE (BENADRYL) tablet 25 mg, Q8H PRN  busPIRone (BUSPAR) tablet 15 mg, TID  propranolol (INDERAL) tablet 80 mg, TID  sertraline (ZOLOFT) tablet 50 mg, Daily  sodium chloride flush 0.9 % injection 10 mL, 2 times per day  sodium chloride flush 0.9 % injection 10 mL, PRN  acetaminophen (TYLENOL) tablet 650 mg, Q4H PRN  enoxaparin (LOVENOX) injection 40 mg, Daily  amitriptyline (ELAVIL) tablet 100 mg, Nightly  oxyCODONE (ROXICODONE) immediate release tablet 5 mg, Q6H PRN        All medication charted and reviewed. CONSULTS      IP CONSULT TO NEUROLOGY    ASSESSMENT/PLAN       Roma Scott is a 48 y.o. male who presents with breakthrough seizure and vertigo. His symptoms have improved on his new medication regimen which was prescribed by neurology.   He has a much steadier gait and should have no difficulty functioning independently and accomplishing his ADLs. · Continue current medication regimen  · Continue home medications and pain control  · Monitor vitals, labs, and imaging  · DISPO: Discharge home with close follow-up with neurology    --  [facility]  Emergency Medicine Resident Physician     This dictation was generated by voice recognition computer software. Although all attempts are made to edit the dictation for accuracy, there may be errors in the transcription that are not intended.
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
## 2020-09-10 NOTE — DISCHARGE SUMMARY
CDU Discharge Summary        Patient:  Lisa Lynn  YOB: 1965    MRN: [id number]   Acct: [de-identified]    Primary Care Physician: No primary care provider on file. Admit date:  9/6/2020 11:51 AM  Discharge date: 9/10/2020     Discharge Diagnoses:     Acute ataxia due to breakthrough seizure  Improved with modifications of home medication regimen    Follow-up:  Call today/tomorrow for a follow up appointment with No primary care provider on file. , or return to the Emergency Room with worsening symptoms    Stressed to patient the importance of following up with primary care doctor for further workup/management of symptoms. Pt verbalizes understanding and agrees with plan. Discharge Medications:  Changes to medications          [facility] Medication Instructions [facility]:[id number]    Printed on:09/10/20 1053   Medication Information                      amitriptyline (ELAVIL) 50 MG tablet  Take 50 mg by mouth nightly             busPIRone (BUSPAR) 15 MG tablet  Take 15 mg by mouth 3 times daily             divalproex (DEPAKOTE) 250 MG DR tablet  Take 1 tab twice a day for 3 days, then take 1 tab for 3 days, then stop             hydrOXYzine (VISTARIL) 50 MG capsule  Take 50 mg by mouth 2 times daily              lamoTRIgine (LAMICTAL) 100 MG tablet  Starting from 1/13/2021, take 1 tab BID             lamoTRIgine (LAMICTAL) 25 MG tablet  25 mg QD x14 days. 25 mg BID x14 days. 25 + 50 mg x14 days. 50 mg BID x14 days. 50 + 75 mg x14 days. 75 mg BID x14 days. 75 + 100 mg [id number]             meclizine (ANTIVERT) 25 MG tablet  Take 1 tablet by mouth 3 times daily as needed for Dizziness             oxyCODONE (ROXICODONE) 5 MG immediate release tablet  Take 1 tablet by mouth every 6 hours as needed for Pain for up to 3 days.              propranolol (INDERAL) 80 MG tablet  Take 80 mg by mouth daily              sertraline (ZOLOFT) 50 MG tablet  Take 50 mg by mouth daily                 Diet:  DIET GENERAL; , Advance as tolerated     Activity:  As tolerated    Consultants: IP CONSULT TO NEUROLOGY    Procedures:  Not indicated     Diagnostic Test:   Results for orders placed or performed during the hospital encounter of 09/06/20   CBC Auto Differential   Result Value Ref Range    WBC 8.0 3.5 - 11.3 k/uL    RBC 4.58 4.21 - 5.77 m/uL    Hemoglobin 13.7 13.0 - 17.0 g/dL    Hematocrit 41.8 40.7 - 50.3 %    MCV 91.3 82.6 - 102.9 fL    MCH 29.9 25.2 - 33.5 pg    MCHC 32.8 28.4 - 34.8 g/dL    RDW 14.1 11.8 - 14.4 %    Platelets 868 495 - 488 k/uL    MPV 10.0 8.1 - 13.5 fL    NRBC Automated 0.0 0.0 per 100 WBC    Differential Type NOT REPORTED     Seg Neutrophils 60 36 - 65 %    Lymphocytes 29 24 - 43 %    Monocytes 10 3 - 12 %    Eosinophils % 1 1 - 4 %    Basophils 0 0 - 2 %    Immature Granulocytes 0 0 %    Segs Absolute 4.75 1.50 - 8.10 k/uL    Absolute Lymph # 2.31 1.10 - 3.70 k/uL    Absolute Mono # 0.79 0.10 - 1.20 k/uL    Absolute Eos # 0.07 0.00 - 0.44 k/uL    Basophils Absolute 0.03 0.00 - 0.20 k/uL    Absolute Immature Granulocyte <0.03 0.00 - 0.30 k/uL    WBC Morphology NOT REPORTED     RBC Morphology NOT REPORTED     Platelet Estimate NOT REPORTED    Basic Metabolic Panel   Result Value Ref Range    Glucose 113 (H) 70 - 99 mg/dL    BUN 17 6 - 20 mg/dL    CREATININE 1.41 (H) 0.70 - 1.20 mg/dL    Bun/Cre Ratio NOT REPORTED 9 - 20    Calcium 9.7 8.6 - 10.4 mg/dL    Sodium 136 135 - 144 mmol/L    Potassium 4.7 3.7 - 5.3 mmol/L    Chloride 100 98 - 107 mmol/L    CO2 21 20 - 31 mmol/L    Anion Gap 15 9 - 17 mmol/L    GFR Non-African American 53 (L) >60 mL/min    GFR African American >60 >60 mL/min    GFR Comment          GFR Staging NOT REPORTED    MAGNESIUM   Result Value Ref Range    Magnesium 1.9 1.6 - 2.6 mg/dL   VALPROIC ACID LEVEL, TOTAL   Result Value Ref Range    Valproic Acid Lvl 60 50 - 125 ug/mL    Valproic Dose amount NOT REPORTED     Valproic Date last dose NOT REPORTED     Valproic Time last dose NOT REPORTED    PHOSPHORUS   Result Value Ref Range    Phosphorus 3.3 2.5 - 4.5 mg/dL   Calcium, Ionized   Result Value Ref Range    Calcium, Ion 1.25 1.13 - 1.33 mmol/L   TOX SCR, BLD, ED   Result Value Ref Range    Acetaminophen Level <5 (L) 10 - 30 ug/mL    Ethanol <10 <10 mg/dL    Ethanol percent <2.636 <4.196 %    Salicylate Lvl <1 (L) 3 - 10 mg/dL    Toxic Tricyclic Sc,Blood POSITIVE (A) NEGATIVE   Basic Metabolic Panel   Result Value Ref Range    Glucose 94 70 - 99 mg/dL    BUN 11 6 - 20 mg/dL    CREATININE 1.02 0.70 - 1.20 mg/dL    Bun/Cre Ratio NOT REPORTED 9 - 20    Calcium 8.7 8.6 - 10.4 mg/dL    Sodium 136 135 - 144 mmol/L    Potassium 4.3 3.7 - 5.3 mmol/L    Chloride 103 98 - 107 mmol/L    CO2 21 20 - 31 mmol/L    Anion Gap 12 9 - 17 mmol/L    GFR Non-African American >60 >60 mL/min    GFR African American >60 >60 mL/min    GFR Comment          GFR Staging NOT REPORTED    Urine Drug Screen   Result Value Ref Range    Amphetamine Screen, Ur NEGATIVE NEGATIVE    Barbiturate Screen, Ur NEGATIVE NEGATIVE    Benzodiazepine Screen, Urine NEGATIVE NEGATIVE    Cocaine Metabolite, Urine NEGATIVE NEGATIVE    Methadone Screen, Urine NEGATIVE NEGATIVE    Opiates, Urine NEGATIVE NEGATIVE    Phencyclidine, Urine NEGATIVE NEGATIVE    Propoxyphene, Urine NOT REPORTED NEGATIVE    Cannabinoid Scrn, Ur NEGATIVE NEGATIVE    Oxycodone Screen, Ur NEGATIVE NEGATIVE    Methamphetamine, Urine NOT REPORTED NEGATIVE    Tricyclic Antidepressants, Urine NOT REPORTED NEGATIVE    MDMA, Urine NOT REPORTED NEGATIVE    Buprenorphine Urine NOT REPORTED NEGATIVE    Test Information       Assay provides medical screening only. The absence of expected drug(s) and/or metabolite(s) may indicate diluted or adulterated urine, limitations of testing or timing of collection.    HEPATIC FUNCTION PANEL   Result Value Ref Range    Alb 3.2 (L) 3.5 - 5.2 g/dL    Alkaline Phosphatase 48 40 - 129 U/L    ALT 10 5 - 41 U/L    AST 18 <40 U/L    Total Bilirubin <0.10 (L) 0.3 - 1.2 mg/dL    Bilirubin, Direct <0.08 <0.31 mg/dL    Bilirubin, Indirect CANNOT BE CALCULATED 0.00 - 1.00 mg/dL    Total Protein 6.4 6.4 - 8.3 g/dL    Globulin NOT REPORTED 1.5 - 3.8 g/dL    Albumin/Globulin Ratio 1.0 1.0 - 2.5   EKG 12 Lead   Result Value Ref Range    Ventricular Rate 57 BPM    Atrial Rate 57 BPM    P-R Interval 182 ms    QRS Duration 124 ms    Q-T Interval 492 ms    QTc Calculation (Bazett) 478 ms    P Axis 76 degrees    R Axis 13 degrees    T Axis 56 degrees     Ct Head Wo Contrast    Result Date: 9/6/2020  EXAMINATION: CT OF THE HEAD WITHOUT CONTRAST  9/6/2020 12:59 pm TECHNIQUE: CT of the head was performed without the administration of intravenous contrast. Dose modulation, iterative reconstruction, and/or weight based adjustment of the mA/kV was utilized to reduce the radiation dose to as low as reasonably achievable. COMPARISON: None. HISTORY: ORDERING SYSTEM PROVIDED HISTORY: fall unknown loc TECHNOLOGIST PROVIDED HISTORY: fall unknown loc Reason for Exam: fall unknown loc Acuity: Unknown Type of Exam: Unknown FINDINGS: BRAIN/VENTRICLES: There is no acute intracranial hemorrhage, mass effect or midline shift. No abnormal extra-axial fluid collection. The gray-white differentiation is maintained without evidence of an acute infarct. There is no evidence of hydrocephalus. ORBITS: The visualized portion of the orbits demonstrate no acute abnormality. SINUSES: The visualized paranasal sinuses and mastoid air cells demonstrate no acute abnormality. SOFT TISSUES/SKULL:  No acute abnormality of the visualized skull or soft tissues. No acute intracranial abnormality.      Mri Brain W Wo Contrast    Result Date: 9/8/2020  EXAMINATION: MRI OF THE BRAIN WITHOUT AND WITH CONTRAST  9/8/2020 10:43 am TECHNIQUE: Multiplanar multisequence MRI of the head/brain was performed without and with the administration of intravenous contrast. COMPARISON: Head CT 09/06/2020 HISTORY: ORDERING SYSTEM PROVIDED HISTORY: Stroke TECHNOLOGIST PROVIDED HISTORY: Stroke with IACs Reason for Exam: stroke with IACs FINDINGS: INTERNAL AUDITORY CANALS: No mass or abnormal enhancement within the cerebellopontine angle cisterns or internal auditory canals. No abnormal enhancement seen along of the facial or vestibulocochlear nerves. The inner ear structures appear unremarkable. The middle ear cavities are clear. The cisternal segments of the trigeminal nerves appear unremarkable. INTRACRANIAL STRUCTURES/VENTRICLES:  No acute infarction. No mass effect or midline shift. No acute intracranial hemorrhage. No abnormal extra-axial fluid collection. Diffuse parenchymal volume loss with enlargement of the ventricles and cerebral sulci. Periventricular white matter T2/FLAIR hyperintense signal.  The sellar/suprasellar regions appear unremarkable. The normal signal voids within the major intracranial vessels appear maintained. No abnormal focus of enhancement is seen within the brain. ORBITS: The visualized portion of the orbits demonstrate no acute abnormality. SINUSES: The visualized paranasal sinuses and mastoid air cells are well aerated. BONES/SOFT TISSUES: The bone marrow signal intensity appears normal. The soft tissues demonstrate no acute abnormality. 1. No acute intracranial abnormality. No acute infarction. 2. Mild parenchymal volume loss and sequela of minimal chronic microvascular ischemic changes. 3. Normal MRI of the internal auditory canals bilaterally. Physical Exam:    General appearance - NAD, AOx 3  Lungs -CTAB, no R/R/R  Heart - RRR, no M/R/G  Abdomen - Soft, NT/ND  Neurological:  MAEx4, gait much steadier than previous exam, hand eye coordination also improving. Overall decreased ataxia  Extremities - Cap refil <2 sec in all ext., no edema  Skin -warm, dry      Hospital Course:  Clinical course has improved, labs and imaging reviewed.      [facility] originally presented to the hospital on 9/6/2020 11:51 AM. with breakthrough seizures. At that time it was determined that He required further observation and evaluation. He was admitted and labs and imaging were followed daily. Imaging results as above. He was seen and evaluated by neurology, who changed his home medication regimen of antiepileptic drugs. Patient clinically improved throughout the course of his stay. He is medically stable to be discharged. Disposition: Home    Patient stated that they will not drive themselves home from the hospital if they have gotten pain killers/ narcotics earlier that day and that they will arrange for transportation on their own or work with the  for a ride. Patient counseled NOT to drive while under the influence of narcotics/ pain killers. Condition: Good    Patient stable and ready for discharge home. I have discussed plan of care with patient and they are in understanding. They were instructed to read discharge paperwork. All of their questions and concerns were addressed. Time Spent: 2 day      --  Wai Ray MD  Emergency Medicine Resident Physician    This dictation was generated by voice recognition computer software. Although all attempts are made to edit the dictation for accuracy, there may be errors in the transcription that are not intended.

## 2020-09-11 NOTE — PROGRESS NOTES
1400 [facility]  CDU / OBSERVATION eNCOUnter  Attending NOte    Late note for 9/6/2020       I performed a history and physical examination of the patient and discussed management with the resident. I reviewed the residents note and agree with the documented findings and plan of care. Any areas of disagreement are noted on the chart. I was personally present for the key portions of any procedures. I have documented in the chart those procedures where I was not present during the key portions. I have reviewed the nurses notes. I agree with the chief complaint, past medical history, past surgical history, allergies, medications, social and family history as documented unless otherwise noted below. The Family history, social history, and ROS are effectively unchanged since admission unless noted elsewhere in the chart. Severe vertigo. Not safe for discharge at this time.     Ramiro Jim MD  Attending Emergency  Physician

## 2020-09-13 LAB
EKG ATRIAL RATE: 57 BPM
EKG P AXIS: 76 DEGREES
EKG P-R INTERVAL: 182 MS
EKG Q-T INTERVAL: 492 MS
EKG QRS DURATION: 124 MS
EKG QTC CALCULATION (BAZETT): 478 MS
EKG R AXIS: 13 DEGREES
EKG T AXIS: 56 DEGREES
EKG VENTRICULAR RATE: 57 BPM

## 2020-11-03 PROBLEM — R42 DIZZINESS: Status: RESOLVED | Noted: 2020-11-03 | Resolved: 2020-11-03

## 2020-11-26 ENCOUNTER — APPOINTMENT (OUTPATIENT)
Dept: CT IMAGING | Age: 55
End: 2020-11-26
Payer: MEDICAID

## 2020-11-26 ENCOUNTER — HOSPITAL ENCOUNTER (EMERGENCY)
Age: 55
Discharge: HOME | End: 2020-11-27
Attending: EMERGENCY MEDICINE
Payer: MEDICAID

## 2020-11-26 VITALS
DIASTOLIC BLOOD PRESSURE: 90 MMHG | SYSTOLIC BLOOD PRESSURE: 149 MMHG | OXYGEN SATURATION: 98 % | WEIGHT: 171 LBS | BODY MASS INDEX: 24.48 KG/M2 | HEIGHT: 70 IN | HEART RATE: 72 BPM | RESPIRATION RATE: 16 BRPM

## 2020-11-26 LAB
BILIRUBIN URINE: NEGATIVE
COLOR: YELLOW
COMMENT UA: NORMAL
GLUCOSE URINE: NEGATIVE
KETONES, URINE: NEGATIVE
LEUKOCYTE ESTERASE, URINE: NEGATIVE
NITRITE, URINE: NEGATIVE
PH UA: 7 (ref 5–8)
PROTEIN UA: NEGATIVE
SPECIFIC GRAVITY UA: 1.01 (ref 1–1.03)
TURBIDITY: CLEAR
URINE HGB: NEGATIVE
UROBILINOGEN, URINE: NORMAL

## 2020-11-26 PROCEDURE — 74176 CT ABD & PELVIS W/O CONTRAST: CPT

## 2020-11-26 PROCEDURE — 87086 URINE CULTURE/COLONY COUNT: CPT

## 2020-11-26 PROCEDURE — 51798 US URINE CAPACITY MEASURE: CPT

## 2020-11-26 PROCEDURE — 96372 THER/PROPH/DIAG INJ SC/IM: CPT

## 2020-11-26 PROCEDURE — 6360000002 HC RX W HCPCS: Performed by: STUDENT IN AN ORGANIZED HEALTH CARE EDUCATION/TRAINING PROGRAM

## 2020-11-26 PROCEDURE — 81003 URINALYSIS AUTO W/O SCOPE: CPT

## 2020-11-26 PROCEDURE — 81001 URINALYSIS AUTO W/SCOPE: CPT

## 2020-11-26 PROCEDURE — 99283 EMERGENCY DEPT VISIT LOW MDM: CPT

## 2020-11-26 RX ORDER — KETOROLAC TROMETHAMINE 30 MG/ML
30 INJECTION, SOLUTION INTRAMUSCULAR; INTRAVENOUS ONCE
Status: COMPLETED | OUTPATIENT
Start: 2020-11-26 | End: 2020-11-26

## 2020-11-26 RX ORDER — FLUTICASONE PROPIONATE 50 MCG
SPRAY, SUSPENSION (ML) NASAL DAILY
Status: ON HOLD | COMMUNITY
End: 2021-04-12 | Stop reason: SDUPTHER

## 2020-11-26 RX ORDER — ALBUTEROL SULFATE 90 UG/1
2 AEROSOL, METERED RESPIRATORY (INHALATION) EVERY 6 HOURS PRN
Status: ON HOLD | COMMUNITY
End: 2021-04-12 | Stop reason: HOSPADM

## 2020-11-26 RX ADMIN — KETOROLAC TROMETHAMINE 30 MG: 30 INJECTION, SOLUTION INTRAMUSCULAR at 23:05

## 2020-11-26 ASSESSMENT — PAIN DESCRIPTION - PAIN TYPE: TYPE: ACUTE PAIN

## 2020-11-26 ASSESSMENT — PAIN SCALES - GENERAL: PAINLEVEL_OUTOF10: 10

## 2020-11-27 LAB
CULTURE: NO GROWTH
Lab: NORMAL
SPECIMEN DESCRIPTION: NORMAL

## 2020-11-27 RX ORDER — OXYCODONE HYDROCHLORIDE AND ACETAMINOPHEN 5; 325 MG/1; MG/1
1 TABLET ORAL EVERY 8 HOURS PRN
Qty: 9 TABLET | Refills: 0 | Status: SHIPPED | OUTPATIENT
Start: 2020-11-27 | End: 2020-11-30

## 2020-11-27 RX ORDER — TAMSULOSIN HYDROCHLORIDE 0.4 MG/1
0.4 CAPSULE ORAL DAILY
Qty: 10 CAPSULE | Refills: 0 | Status: ON HOLD | OUTPATIENT
Start: 2020-11-27 | End: 2021-04-12 | Stop reason: HOSPADM

## 2020-11-27 ASSESSMENT — ENCOUNTER SYMPTOMS
COUGH: 0
NAUSEA: 1
CHEST TIGHTNESS: 0
SORE THROAT: 0
ABDOMINAL PAIN: 1
VOMITING: 0
SHORTNESS OF BREATH: 0
BACK PAIN: 1

## 2020-11-27 NOTE — ED PROVIDER NOTES
[facility] ED  Emergency Department Encounter  Emergency Medicine Resident     Pt Name: Love Weber  MRN: [id number]  [place] 1965  Date of evaluation: 11/26/20  PCP:  No primary care provider on file. CHIEF COMPLAINT       Chief Complaint   Patient presents with    Dysuria     hx of bladder flap, painful urination and unsteady stream       HISTORY OFPRESENT ILLNESS  (Location/Symptom, Timing/Onset, Context/Setting, Quality, Duration, Modifying Factors,Severity.)      Love Weber is a 50 y. o.yo male past medical history significant for seizures, depression, COPD, bladder flap due to previous injury who presents with difficulty urinating for the past 3 weeks that is acutely worse in the past 2 days. Patient states that he had an injury in the past which he subsequently had to have surgery and a bladder flap placed. Over the past 3 weeks worsening the past 2 days patient states that he has been having difficulty in urinating with no dysuria, blood in the urine, no risk for STD exposure. He does state that the pain is localized in the lower abdomen and is now radiating into the right flank. He does deny fever, chills, cough, congestion, chest pain, generalized malaise. PAST MEDICAL / SURGICAL / SOCIAL / FAMILY HISTORY      has a past medical history of Hep B w/ coma and Seizures ([facility] [address] has a past surgical history that includes hernia repair and back surgery. Social:  reports that he has been smoking cigarettes. He has been smoking about 1.00 pack per day. He has never used smokeless tobacco. He reports previous alcohol use. Family Hx: History reviewed. No pertinent family history. Allergies:  Patient has no known allergies. Home Medications:  Prior to Admission medications    Medication Sig Start Date End Date Taking?  Authorizing Provider   tamsulosin (FLOMAX) 0.4 MG capsule Take 1 capsule by mouth daily for 10 days 11/27/20 12/7/20 Yes Mary Parcel, DO oxyCODONE-acetaminophen (PERCOCET) 5-325 MG per tablet Take 1 tablet by mouth every 8 hours as needed for Pain for up to 3 days. Intended supply: 3 days. Take lowest dose possible to manage pain 11/27/20 11/30/20 Yes Pinky Barks, DO   fluticasone (FLONASE) 50 MCG/ACT nasal spray by Each Nostril route daily   Yes Historical Provider, MD   albuterol sulfate HFA (VENTOLIN HFA) 108 (90 Base) MCG/ACT inhaler Inhale 2 puffs into the lungs every 6 hours as needed for Wheezing   Yes Historical Provider, MD   lamoTRIgine (LAMICTAL) 100 MG tablet Starting from 1/13/2021, take 1 tab BID 9/8/20  Yes THERESA Urrutia CNP   busPIRone (BUSPAR) 15 MG tablet Take 15 mg by mouth 3 times daily   Yes Historical Provider, MD   hydrOXYzine (VISTARIL) 50 MG capsule Take 50 mg by mouth 2 times daily    Yes Historical Provider, MD   propranolol (INDERAL) 80 MG tablet Take 80 mg by mouth daily    Yes Historical Provider, MD   sertraline (ZOLOFT) 50 MG tablet Take 50 mg by mouth daily   Yes Historical Provider, MD   divalproex (DEPAKOTE) 250 MG DR tablet Take 1 tab twice a day for 3 days, then take 1 tab for 3 days, then stop 9/8/20   THERESA Urrutia CNP   lamoTRIgine (LAMICTAL) 25 MG tablet 25 mg QD x14 days. 25 mg BID x14 days. 25 + 50 mg x14 days. 50 mg BID x14 days. 50 + 75 mg x14 days. 75 mg BID x14 days. 75 + 100 mg [id number] 9/9/20   THERESA Urrutia CNP   amitriptyline (ELAVIL) 50 MG tablet Take 50 mg by mouth nightly    Historical Provider, MD       REVIEW OFSYSTEMS    (2-9 systems for level 4, 10 or more for level 5)      Review of Systems   Constitutional: Negative for activity change, appetite change, chills and fever. HENT: Negative for congestion and sore throat. Respiratory: Negative for cough, chest tightness and shortness of breath. Cardiovascular: Negative for chest pain. Gastrointestinal: Positive for abdominal pain (lower abdominal pain) and nausea. Negative for vomiting.    Genitourinary: Positive for decreased urine volume, difficulty urinating and flank pain (right sided flank pain ). Negative for discharge, hematuria and penile pain. Musculoskeletal: Positive for back pain (right sided ). Skin: Negative for rash and wound. Neurological: Negative for light-headedness and headaches. PHYSICAL EXAM   (up to 7 for level 4, 8 or more forlevel 5)      INITIAL VITALS:   Vitals:    11/26/20 2214   BP: (!) 149/90   Pulse: 72   Resp: 16   SpO2: 98%        Physical Exam  Vitals signs and nursing note reviewed. Constitutional:       General: He is not in acute distress. Appearance: Normal appearance. He is not ill-appearing. HENT:      Head: Normocephalic and atraumatic. Nose: Nose normal.      Mouth/Throat:      Mouth: Mucous membranes are moist.      Pharynx: Oropharynx is clear. Eyes:      General:         Right eye: No discharge. Left eye: No discharge. Cardiovascular:      Rate and Rhythm: Normal rate. Pulmonary:      Effort: Pulmonary effort is normal.      Breath sounds: No wheezing. Abdominal:      General: Abdomen is flat. Palpations: Abdomen is soft. Tenderness: There is abdominal tenderness (lower abdominal pain). There is right CVA tenderness. Skin:     General: Skin is warm and dry. Neurological:      General: No focal deficit present. Mental Status: He is alert and oriented to person, place, and time. Motor: No weakness. Psychiatric:         Mood and Affect: Mood normal.         Behavior: Behavior normal.         Thought Content: Thought content normal.         DIFFERENTIAL  DIAGNOSIS       DDX: Cystitis versus nephrolithiasis versus pyelonephritis. Likely pyelonephritis as he has had no fever, chills. Initial MDM/Plan: 50 y.o. male who presents with dysuria for 3 weeks acutely worsening in the past 2 days.   We will plan for urinalysis and bladder scan and CT scan to r/o nephrolithiasis     DIAGNOSTIC RESULTS / EMERGENCYDEPARTMENT COURSE / MDM     LABS:  Labs Reviewed   CULTURE, URINE   URINALYSIS   URINALYSIS WITH MICROSCOPIC         RADIOLOGY:  Ct Abdomen Pelvis Wo Contrast Additional Contrast? None    Result Date: 11/26/2020  EXAMINATION: CT OF THE ABDOMEN AND PELVIS WITHOUT CONTRAST 11/26/2020 9:29 pm TECHNIQUE: CT of the abdomen and pelvis was performed without the administration of intravenous contrast. Multiplanar reformatted images are provided for review. Dose modulation, iterative reconstruction, and/or weight based adjustment of the mA/kV was utilized to reduce the radiation dose to as low as reasonably achievable. COMPARISON: None. HISTORY: ORDERING SYSTEM PROVIDED HISTORY: right sided flank pain with CVA tenderness, r/o kidney stones TECHNOLOGIST PROVIDED HISTORY: right sided flank pain with CVA tenderness, r/o kidney stones Reason for Exam: right sided flank pain with CVA tenderness, r/o kidney stones Acuity: Acute Type of Exam: Initial FINDINGS: Lower Chest: Faint ground-glass opacities are present within the lung bases bilaterally, and may represent atelectasis versus early pneumonia. Organs: The liver, spleen, pancreas appear normal.  Gallbladder is partially contracted. No calcified gallstones are present. Nodular thickening of the left adrenal gland is present, consistent with adenomatous changes. Multiple bilateral intrarenal calculi are present largest measuring 4 mm. No hydronephrosis or hydroureter is present. A 2 mm calculus is present at the level of the right UVJ. GI/Bowel: A large amount of food debris is present within the stomach. Small bowel appears normal without evidence of obstruction. The appendix is normal.  Scattered colonic diverticula are present without evidence of diverticulitis. Pelvis: Urinary bladder is incompletely distended. Prostate gland is mildly enlarged. Peritoneum/Retroperitoneum: No free fluid or free air is present within the abdomen or pelvis.   No aneurysm formation is present. . Bones/Soft Tissues: No acute osseous abnormality is present     1. Nonobstructing 2 mm calculus at the level of the right UVJ 2. Multiple bilateral intrarenal calculi, largest measuring 4 mm 3. Scattered colonic diverticula, without evidence of diverticulitis 4. Faint ground-glass opacities noted within the lung bases bilaterally. Differential considerations would include atelectasis versus early pneumonia         EKG  Not indicated at this time    All EKG's are interpreted by the Emergency Department Physicianwho either signs or Co-signs this chart in the absence of a cardiologist.    EMERGENCY DEPARTMENT COURSE:    82-year-old male with past medical history significant for bladder flap and currently having difficulty urinating with complaints of retention and CVA tenderness. Will plan for urinalysis, bladder scan, and CT to r/o nephrolithiasis. Reviewed nursing note, RN, Linda Servin, bladder scan showing 128 ml. Within normal limits. Toradol 30 mg for symptomatic relief. Pending urine, CT, and ability to pass urine will decide on dispo. CT reviewed - nonobstructing 2mm calculus at the level of  The right UVJ. Multiple bilateral intrarenal calculi, largest measuring 4 mm. See discussion with patient about findings of CT. Discussed that the stones are small enough to pass and that he will likely continue to have some pain. Patient is able to tolerate oral intake and asking for water at this time. Patient was able to urinate for us will dispo pending urinalysis. Lengthy discussion with patient about importance of follow-up with urology for his known bladder flap. [facility] but has not followed with them for the past 4 years. Discussed that I will provide him with urology resident clinic contact information. Urinalysis reviewed. Negative for infectious type picture. Negative for hemoglobin.      Will discharge to home with plan for follow up with urology. PROCEDURES:  None    CONSULTS:  None      FINAL IMPRESSION      1. Nephrolithiasis          DISPOSITION / PLAN     DISPOSITION      Charge to home with prescription for pain medication, Flomax, lengthy discussion on importance of follow-up with urology. Urology contact information provided in discharge instructions. PATIENT REFERRED TO:  [facility]  [address] [id number] [phone]    As needed, If symptoms worsen    [facility]  [address] [address] [place]   [phone]  Call   Call to schedule appointment to follow up for previously known bladder flap and bladder wall thickening noted on CT scan      DISCHARGE MEDICATIONS:  New Prescriptions    OXYCODONE-ACETAMINOPHEN (PERCOCET) 5-325 MG PER TABLET    Take 1 tablet by mouth every 8 hours as needed for Pain for up to 3 days. Intended supply: 3 days.  Take lowest dose possible to manage pain    TAMSULOSIN (FLOMAX) 0.4 MG CAPSULE    Take 1 capsule by mouth daily for 10 days       Mayra Fine DO  Emergency Medicine Resident    (Please note that portions of this note were completed with a voice recognition program.Efforts were made to edit the dictations but occasionally words are mis-transcribed.)       Mayra Fine DO  Resident  11/27/20 5289

## 2020-11-27 NOTE — ED PROVIDER NOTES
[facility]  Emergency Department  Faculty Attestation     I performed a history and physical examination of the patient and discussed management with the resident. I reviewed the residents note and agree with the documented findings and plan of care. Any areas of disagreement are noted on the chart. I was personally present for the key portions of any procedures. I have documented in the chart those procedures where I was not present during the key portions. I have reviewed the emergency nurses triage note. I agree with the chief complaint, past medical history, past surgical history, allergies, medications, social and family history as documented unless otherwise noted below. For Physician Assistant/ Nurse Practitioner cases/documentation I have personally evaluated this patient and have completed at least one if not all key elements of the E/M (history, physical exam, and MDM). Additional findings are as noted. Primary Care Physician:  No primary care provider on file. Screenings:  [unfilled]    CHIEF COMPLAINT       Chief Complaint   Patient presents with    Dysuria     hx of bladder flap, painful urination and unsteady stream       RECENT VITALS:    ,  Pulse: 72, Resp: 16, BP: (!) 149/90    LABS:  Labs Reviewed   CULTURE, URINE   URINALYSIS       Radiology  CT ABDOMEN PELVIS WO CONTRAST Additional Contrast? None   Final Result   1. Nonobstructing 2 mm calculus at the level of the right UVJ   2. Multiple bilateral intrarenal calculi, largest measuring 4 mm   3. Scattered colonic diverticula, without evidence of diverticulitis   4. Faint ground-glass opacities noted within the lung bases bilaterally. Differential considerations would include atelectasis versus early pneumonia                 Attending Physician Additional  Notes    Denies urinary frequency, right flank pain, difficulty getting his urine to come out, sensation of a full bladder after he pees. No fevers or chills. No nausea or vomiting. No history of stones. A history of a bladder flap seen on his prior imaging that predisposed him to her prior UTI. No previous need for Solano catheterization. No known prostate disease. On exam he is uncomfortable, pain score 10/10 when he is trying to void, 7/10 without. Abdomen is slightly tender in the suprapubic region with palpable bladder. There is right CVA tenderness. Concern is for possible stone versus UTI versus urinary retention. Plan is urinalysis, analgesics, laboratory studies, CT abdomen for stone, reassess. Anticipate urology consultation. Gunnar Donaldson.  Miranda Sequeira MD, [address]  Attending Emergency  Physician               Levar Salcedo MD  11/26/20 8769
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
## 2020-12-16 ENCOUNTER — HOSPITAL ENCOUNTER (EMERGENCY)
Age: 55
Discharge: HOME | End: 2020-12-17
Attending: EMERGENCY MEDICINE
Payer: MEDICAID

## 2020-12-16 VITALS
WEIGHT: 170 LBS | DIASTOLIC BLOOD PRESSURE: 48 MMHG | OXYGEN SATURATION: 95 % | TEMPERATURE: 97.6 F | HEART RATE: 60 BPM | RESPIRATION RATE: 16 BRPM | SYSTOLIC BLOOD PRESSURE: 90 MMHG | BODY MASS INDEX: 24.74 KG/M2

## 2020-12-16 PROCEDURE — 99284 EMERGENCY DEPT VISIT MOD MDM: CPT

## 2020-12-16 PROCEDURE — 6370000000 HC RX 637 (ALT 250 FOR IP): Performed by: STUDENT IN AN ORGANIZED HEALTH CARE EDUCATION/TRAINING PROGRAM

## 2020-12-16 RX ORDER — IBUPROFEN 400 MG/1
400 TABLET ORAL ONCE
Status: COMPLETED | OUTPATIENT
Start: 2020-12-16 | End: 2020-12-16

## 2020-12-16 RX ADMIN — IBUPROFEN 400 MG: 400 TABLET, FILM COATED ORAL at 23:09

## 2020-12-16 ASSESSMENT — PAIN SCALES - GENERAL: PAINLEVEL_OUTOF10: 5

## 2020-12-17 NOTE — ED NOTES
Patient brought in by EMS on [facility]  EMS states patient staying at a shelter, [address]  Patient has ETOH on board so shelter would not allow him to enter shelter until he is sober  Patient denies any complaints or any needs     Solomon Crigler, RN  12/16/20 3514

## 2020-12-17 NOTE — ED PROVIDER NOTES
[facility] ED  Emergency Department Encounter  EmergencyMedicine Resident     Pt Mari Manzano  MRN: [id number]  [place] 1965  Date of evaluation: 12/16/20  PCP:  No primary care provider on file. CHIEF COMPLAINT       Chief Complaint   Patient presents with    Other     No complaints, EMS stated he has ETOH on board and the shelter would not allow him to go inside with ETOH on board       HISTORY OF PRESENT ILLNESS  (Location/Symptom, Timing/Onset, Context/Setting, Quality, Duration, Modifying Factors, Severity.)      Kamala Mace is a 50 y.o. male who presents with unclear history. Initially, per nursing PA-C staff and patient he was brought to the emergency department by EMS after presenting to the [address] to stay the night but was turned away for alcohol intoxication. Denies suicidal ideation homicidal ideation or polysubstance abuse. The patient states that he may have had a seizure but is unsure. He is supposed to be on Lamictal and Depakote and states that he is compliant and has medication. Patient also states that he slipped while going down the stairs and received an abrasion to his left arm but did not tumble did not hit head did not hit neck. He complains of a mild headache denies confusion or pain anywhere else. Chart review shows history of traumatic subarachnoid hemorrhage and seizure disorder, patient reiterates that he did not hit head is not on antiplatelet medication. PAST MEDICAL / SURGICAL / SOCIAL / FAMILY HISTORY      has a past medical history of Hep B w/ coma and Seizures ([facility] [address] has a past surgical history that includes hernia repair and back surgery. Social History     Socioeconomic History    Marital status:       Spouse name: Not on file    Number of children: Not on file    Years of education: Not on file    Highest education level: Not on file   Occupational History    Not on file   Social Needs    Financial resource strain: Not on file    Food insecurity     Worry: Not on file     Inability: Not on file    Transportation needs     Medical: Not on file     Non-medical: Not on file   Tobacco Use    Smoking status: Current Every Day Smoker     Packs/day: 1.00     Types: Cigarettes    Smokeless tobacco: Never Used   Substance and Sexual Activity    Alcohol use: Yes     Frequency: Never    Drug use: Yes     Types: Marijuana    Sexual activity: Not on file   Lifestyle    Physical activity     Days per week: Not on file     Minutes per session: Not on file    Stress: Not on file   Relationships    Social connections     Talks on phone: Not on file     Gets together: Not on file     Attends Gnosticism service: Not on file     Active member of club or organization: Not on file     Attends meetings of clubs or organizations: Not on file     Relationship status: Not on file    Intimate partner violence     Fear of current or ex partner: Not on file     Emotionally abused: Not on file     Physically abused: Not on file     Forced sexual activity: Not on file   Other Topics Concern    Not on file   Social History Narrative    Not on file       History reviewed. No pertinent family history. Allergies:  Patient has no known allergies. Home Medications:  Prior to Admission medications    Medication Sig Start Date End Date Taking?  Authorizing Provider   tamsulosin (FLOMAX) 0.4 MG capsule Take 1 capsule by mouth daily for 10 days 11/27/20 12/7/20  Deloria Bathe, DO   fluticasone (FLONASE) 50 MCG/ACT nasal spray by Each Nostril route daily    Historical Provider, MD   albuterol sulfate HFA (VENTOLIN HFA) 108 (90 Base) MCG/ACT inhaler Inhale 2 puffs into the lungs every 6 hours as needed for Wheezing    Historical Provider, MD   divalproex (DEPAKOTE) 250 MG DR tablet Take 1 tab twice a day for 3 days, then take 1 tab for 3 days, then stop 9/8/20   THERESA Urrutia - CNP   lamoTRIgine (LAMICTAL) 25 MG tablet 25 mg QD x14 questions. HEENT: Head: normocephalic/atraumatic eyes: PERRLA, EOMT, conjunctiva not injected, sclerae nonicteric ears: External canals patent nose: Nares patent, no rhinorrhea, throat:mucous membranes moist, oropharynx clear     Neck: No tenderness palpation of the cervical spine no step-offs or deformities trachea midline, no JVD. Lungs: No evidence of increased work of breathing. CTA B/L, no wheezes/rhonchi     Cardiovascular: RRR, , 2+ peripheral pulses bilaterally. Cap refill less than 2 seconds. No lower extremity edema noted    Abdomen: Soft, nontender. No guarding or rebound tenderness. Neurologic: BALES  to person, place, time, and event. No sensation deficits. Moving all extremities    Extremities: Superficial abrasion left forearm, no pain to palpation of the bony landmarks 5 of 5 strength of the elbows wrists to extension flexion bilateral upper extremities    DIFFERENTIAL  DIAGNOSIS     PLAN (LABS / IMAGING / EKG):  No orders of the defined types were placed in this encounter. MEDICATIONS ORDERED:  Orders Placed This Encounter   Medications    ibuprofen (ADVIL;MOTRIN) tablet 400 mg       DIAGNOSTIC RESULTS / EMERGENCY DEPARTMENT COURSE / MDM     LABS:  No results found for this visit on 12/16/20. RADIOLOGY:      EMERGENCY DEPARTMENT COURSE/IMPRESSION:     82-year-old male here for alcohol intoxication medical clearance with possible seizure. Patient states he is on Depakote and valproate and has been compliant, does not feel that he needs to stay for laboratory levels which we feel is reasonable. Patient is able to ambulate engage in conversation without signs of impairment due to alcohol or other altering substance. He is medically clear for return to the [address]. Social work will help arrange transport. Return precautions given if patient has seizure or worsening of symptoms.   PROCEDURES:  None    CONSULTS:  None    CRITICAL CARE:  None    FINAL IMPRESSION      1. Acute alcoholic intoxication without complication ([facility] [address]    2.  Abrasion          DISPOSITION / PLAN     DISPOSITION Decision To Discharge 12/16/2020 11:10:35 PM      PATIENT REFERRED TO:  [address] [address]  [phone]  Call   Call for follow-up for seizure management      DISCHARGE MEDICATIONS:  New Prescriptions    No medications on file       Andra Boucher MD  Emergency Medicine Resident    (Please note that portions of this note were completed with a voice recognition program.  Efforts were made to edit the dictations but occasionally words aremis-transcribed.)        Andra Boucher MD  Resident  12/16/20 7791

## 2020-12-17 NOTE — ED NOTES
[place] assisted patient with belongings and with taking patient to waiting room     Ab Lynn RN  12/17/20 4954

## 2020-12-17 NOTE — ED NOTES
arranged transportation for patient after a ED discharge per request of RN  to [address], [address] [facility] Trip # [id number] ETA . 30 min-3 hours. Writer was told patient is medically cleared by RN.       EDDY Baca  12/16/20 2191
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
## 2020-12-17 NOTE — ED NOTES
called insurance in regards to previously called in request. Request for transportation was dropped by insurance due to time of night and no 's picking up the call.  [employer] voucher was used to assist patient back to [address]. per patient's request.      EDDY Azul  12/17/20 0450

## 2020-12-17 NOTE — ED NOTES
NABIL will set up transportation to [facility], patient will then be discharged     [facility]  12/16/20 7125

## 2021-03-29 ENCOUNTER — APPOINTMENT (OUTPATIENT)
Dept: GENERAL RADIOLOGY | Age: 56
DRG: 140 | End: 2021-03-29
Payer: MEDICAID

## 2021-03-29 ENCOUNTER — HOSPITAL ENCOUNTER (INPATIENT)
Age: 56
LOS: 2 days | Discharge: PSYCHIATRIC OR SUBSTANCE USE FACILITY | DRG: 140 | End: 2021-04-02
Attending: EMERGENCY MEDICINE | Admitting: EMERGENCY MEDICINE
Payer: MEDICAID

## 2021-03-29 DIAGNOSIS — R06.02 SHORTNESS OF BREATH: ICD-10-CM

## 2021-03-29 DIAGNOSIS — F10.930 ALCOHOL WITHDRAWAL SYNDROME WITHOUT COMPLICATION (HCC): Primary | ICD-10-CM

## 2021-03-29 LAB
ABSOLUTE EOS #: 0.06 K/UL (ref 0–0.44)
ABSOLUTE IMMATURE GRANULOCYTE: <0.03 K/UL (ref 0–0.3)
ABSOLUTE LYMPH #: 2.97 K/UL (ref 1.1–3.7)
ABSOLUTE MONO #: 0.96 K/UL (ref 0.1–1.2)
ALBUMIN SERPL-MCNC: 4.6 G/DL (ref 3.5–5.2)
ALBUMIN/GLOBULIN RATIO: 1.2 (ref 1–2.5)
ALP BLD-CCNC: 92 U/L (ref 40–129)
ALT SERPL-CCNC: 12 U/L (ref 5–41)
ANION GAP SERPL CALCULATED.3IONS-SCNC: 16 MMOL/L (ref 9–17)
AST SERPL-CCNC: 26 U/L
BASOPHILS # BLD: 0 % (ref 0–2)
BASOPHILS ABSOLUTE: 0.04 K/UL (ref 0–0.2)
BETA-HYDROXYBUTYRATE: 0.18 MMOL/L (ref 0.02–0.27)
BILIRUB SERPL-MCNC: 0.34 MG/DL (ref 0.3–1.2)
BNP INTERPRETATION: NORMAL
BUN BLDV-MCNC: 14 MG/DL (ref 6–20)
BUN/CREAT BLD: ABNORMAL (ref 9–20)
CALCIUM SERPL-MCNC: 10.1 MG/DL (ref 8.6–10.4)
CHLORIDE BLD-SCNC: 99 MMOL/L (ref 98–107)
CO2: 24 MMOL/L (ref 20–31)
CREAT SERPL-MCNC: 0.95 MG/DL (ref 0.7–1.2)
DIFFERENTIAL TYPE: ABNORMAL
EOSINOPHILS RELATIVE PERCENT: 1 % (ref 1–4)
ETHANOL PERCENT: 0.04 %
ETHANOL: 35 MG/DL
GFR AFRICAN AMERICAN: >60 ML/MIN
GFR NON-AFRICAN AMERICAN: >60 ML/MIN
GFR SERPL CREATININE-BSD FRML MDRD: ABNORMAL ML/MIN/{1.73_M2}
GFR SERPL CREATININE-BSD FRML MDRD: ABNORMAL ML/MIN/{1.73_M2}
GLUCOSE BLD-MCNC: 94 MG/DL (ref 70–99)
HCT VFR BLD CALC: 43.8 % (ref 40.7–50.3)
HEMOGLOBIN: 14.9 G/DL (ref 13–17)
IMMATURE GRANULOCYTES: 0 %
LIPASE: 29 U/L (ref 13–60)
LYMPHOCYTES # BLD: 32 % (ref 24–43)
MAGNESIUM: 2.1 MG/DL (ref 1.6–2.6)
MCH RBC QN AUTO: 28.5 PG (ref 25.2–33.5)
MCHC RBC AUTO-ENTMCNC: 34 G/DL (ref 28.4–34.8)
MCV RBC AUTO: 83.7 FL (ref 82.6–102.9)
MONOCYTES # BLD: 10 % (ref 3–12)
MYOGLOBIN: 63 NG/ML (ref 28–72)
NRBC AUTOMATED: 0 PER 100 WBC
PDW BLD-RTO: 15.9 % (ref 11.8–14.4)
PLATELET # BLD: 328 K/UL (ref 138–453)
PLATELET ESTIMATE: ABNORMAL
PMV BLD AUTO: 11.7 FL (ref 8.1–13.5)
POTASSIUM SERPL-SCNC: 3.5 MMOL/L (ref 3.7–5.3)
PRO-BNP: 97 PG/ML
RBC # BLD: 5.23 M/UL (ref 4.21–5.77)
RBC # BLD: ABNORMAL 10*6/UL
SARS-COV-2, RAPID: NOT DETECTED
SEG NEUTROPHILS: 57 % (ref 36–65)
SEGMENTED NEUTROPHILS ABSOLUTE COUNT: 5.21 K/UL (ref 1.5–8.1)
SODIUM BLD-SCNC: 139 MMOL/L (ref 135–144)
SPECIMEN DESCRIPTION: NORMAL
TOTAL CK: 175 U/L (ref 39–308)
TOTAL PROTEIN: 8.5 G/DL (ref 6.4–8.3)
TROPONIN INTERP: NORMAL
TROPONIN INTERP: NORMAL
TROPONIN T: NORMAL NG/ML
TROPONIN T: NORMAL NG/ML
TROPONIN, HIGH SENSITIVITY: 7 NG/L (ref 0–22)
TROPONIN, HIGH SENSITIVITY: 7 NG/L (ref 0–22)
WBC # BLD: 9.3 K/UL (ref 3.5–11.3)
WBC # BLD: ABNORMAL 10*3/UL

## 2021-03-29 PROCEDURE — 83880 ASSAY OF NATRIURETIC PEPTIDE: CPT

## 2021-03-29 PROCEDURE — 6370000000 HC RX 637 (ALT 250 FOR IP): Performed by: EMERGENCY MEDICINE

## 2021-03-29 PROCEDURE — 83735 ASSAY OF MAGNESIUM: CPT

## 2021-03-29 PROCEDURE — 96375 TX/PRO/DX INJ NEW DRUG ADDON: CPT

## 2021-03-29 PROCEDURE — 84484 ASSAY OF TROPONIN QUANT: CPT

## 2021-03-29 PROCEDURE — 83690 ASSAY OF LIPASE: CPT

## 2021-03-29 PROCEDURE — 94761 N-INVAS EAR/PLS OXIMETRY MLT: CPT

## 2021-03-29 PROCEDURE — 87635 SARS-COV-2 COVID-19 AMP PRB: CPT

## 2021-03-29 PROCEDURE — 82550 ASSAY OF CK (CPK): CPT

## 2021-03-29 PROCEDURE — 80053 COMPREHEN METABOLIC PANEL: CPT

## 2021-03-29 PROCEDURE — 99285 EMERGENCY DEPT VISIT HI MDM: CPT

## 2021-03-29 PROCEDURE — 96374 THER/PROPH/DIAG INJ IV PUSH: CPT

## 2021-03-29 PROCEDURE — 2580000003 HC RX 258: Performed by: EMERGENCY MEDICINE

## 2021-03-29 PROCEDURE — 6360000002 HC RX W HCPCS: Performed by: EMERGENCY MEDICINE

## 2021-03-29 PROCEDURE — G0480 DRUG TEST DEF 1-7 CLASSES: HCPCS

## 2021-03-29 PROCEDURE — 82010 KETONE BODYS QUAN: CPT

## 2021-03-29 PROCEDURE — 71045 X-RAY EXAM CHEST 1 VIEW: CPT

## 2021-03-29 PROCEDURE — 85025 COMPLETE CBC W/AUTO DIFF WBC: CPT

## 2021-03-29 PROCEDURE — 93005 ELECTROCARDIOGRAM TRACING: CPT | Performed by: EMERGENCY MEDICINE

## 2021-03-29 PROCEDURE — 83874 ASSAY OF MYOGLOBIN: CPT

## 2021-03-29 PROCEDURE — 94640 AIRWAY INHALATION TREATMENT: CPT

## 2021-03-29 RX ORDER — FOLIC ACID 1 MG/1
1 TABLET ORAL ONCE
Status: COMPLETED | OUTPATIENT
Start: 2021-03-29 | End: 2021-03-29

## 2021-03-29 RX ORDER — ONDANSETRON 2 MG/ML
4 INJECTION INTRAMUSCULAR; INTRAVENOUS ONCE
Status: COMPLETED | OUTPATIENT
Start: 2021-03-29 | End: 2021-03-29

## 2021-03-29 RX ORDER — GAUZE BANDAGE 2" X 2"
100 BANDAGE TOPICAL ONCE
Status: COMPLETED | OUTPATIENT
Start: 2021-03-29 | End: 2021-03-29

## 2021-03-29 RX ORDER — METHYLPREDNISOLONE SODIUM SUCCINATE 125 MG/2ML
125 INJECTION, POWDER, LYOPHILIZED, FOR SOLUTION INTRAMUSCULAR; INTRAVENOUS ONCE
Status: COMPLETED | OUTPATIENT
Start: 2021-03-29 | End: 2021-03-29

## 2021-03-29 RX ORDER — ALBUTEROL SULFATE 90 UG/1
2 AEROSOL, METERED RESPIRATORY (INHALATION) EVERY 4 HOURS PRN
Status: DISCONTINUED | OUTPATIENT
Start: 2021-03-29 | End: 2021-04-02 | Stop reason: HOSPADM

## 2021-03-29 RX ORDER — 0.9 % SODIUM CHLORIDE 0.9 %
1000 INTRAVENOUS SOLUTION INTRAVENOUS ONCE
Status: COMPLETED | OUTPATIENT
Start: 2021-03-29 | End: 2021-03-29

## 2021-03-29 RX ADMIN — ONDANSETRON 4 MG: 2 INJECTION INTRAMUSCULAR; INTRAVENOUS at 22:14

## 2021-03-29 RX ADMIN — ALBUTEROL SULFATE 2 PUFF: 90 AEROSOL, METERED RESPIRATORY (INHALATION) at 22:46

## 2021-03-29 RX ADMIN — Medication 100 MG: at 22:14

## 2021-03-29 RX ADMIN — SODIUM CHLORIDE 1000 ML: 9 INJECTION, SOLUTION INTRAVENOUS at 22:15

## 2021-03-29 RX ADMIN — FOLIC ACID 1 MG: 1 TABLET ORAL at 22:14

## 2021-03-29 RX ADMIN — METHYLPREDNISOLONE SODIUM SUCCINATE 125 MG: 125 INJECTION, POWDER, FOR SOLUTION INTRAMUSCULAR; INTRAVENOUS at 22:15

## 2021-03-29 RX ADMIN — Medication 2 PUFF: at 22:46

## 2021-03-29 ASSESSMENT — ENCOUNTER SYMPTOMS
ABDOMINAL PAIN: 1
CONSTIPATION: 0
NAUSEA: 1
DIARRHEA: 0
COUGH: 1
WHEEZING: 1
SORE THROAT: 0
SHORTNESS OF BREATH: 1
VOMITING: 1

## 2021-03-29 ASSESSMENT — PAIN DESCRIPTION - FREQUENCY: FREQUENCY: CONTINUOUS

## 2021-03-29 ASSESSMENT — PAIN SCALES - GENERAL: PAINLEVEL_OUTOF10: 8

## 2021-03-29 ASSESSMENT — PAIN DESCRIPTION - PAIN TYPE: TYPE: CHRONIC PAIN

## 2021-03-29 ASSESSMENT — PAIN DESCRIPTION - DESCRIPTORS: DESCRIPTORS: DULL;THROBBING

## 2021-03-29 NOTE — Clinical Note
Patient Class: Observation [104]   REQUIRED: Diagnosis: Shortness of breath [786.05. ICD-9-CM]   Estimated Length of Stay: Estimated stay of less than 2 midnights   Admitting Provider: Ailyn Gregg [[id number]]

## 2021-03-30 PROBLEM — R06.02 SHORTNESS OF BREATH: Status: ACTIVE | Noted: 2021-03-30

## 2021-03-30 LAB
ABSOLUTE EOS #: <0.03 K/UL (ref 0–0.44)
ABSOLUTE IMMATURE GRANULOCYTE: 0.03 K/UL (ref 0–0.3)
ABSOLUTE LYMPH #: 0.71 K/UL (ref 1.1–3.7)
ABSOLUTE MONO #: 0.11 K/UL (ref 0.1–1.2)
ANION GAP SERPL CALCULATED.3IONS-SCNC: 13 MMOL/L (ref 9–17)
BASOPHILS # BLD: 0 % (ref 0–2)
BASOPHILS ABSOLUTE: <0.03 K/UL (ref 0–0.2)
BILIRUBIN URINE: NEGATIVE
BUN BLDV-MCNC: 12 MG/DL (ref 6–20)
BUN/CREAT BLD: ABNORMAL (ref 9–20)
CALCIUM SERPL-MCNC: 9.6 MG/DL (ref 8.6–10.4)
CHLORIDE BLD-SCNC: 104 MMOL/L (ref 98–107)
CO2: 21 MMOL/L (ref 20–31)
COLOR: YELLOW
COMMENT UA: ABNORMAL
CREAT SERPL-MCNC: 0.84 MG/DL (ref 0.7–1.2)
DIFFERENTIAL TYPE: ABNORMAL
EKG ATRIAL RATE: 61 BPM
EKG ATRIAL RATE: 79 BPM
EKG P AXIS: 72 DEGREES
EKG P AXIS: 73 DEGREES
EKG P-R INTERVAL: 132 MS
EKG P-R INTERVAL: 136 MS
EKG Q-T INTERVAL: 402 MS
EKG Q-T INTERVAL: 430 MS
EKG QRS DURATION: 94 MS
EKG QRS DURATION: 98 MS
EKG QTC CALCULATION (BAZETT): 432 MS
EKG QTC CALCULATION (BAZETT): 460 MS
EKG R AXIS: 24 DEGREES
EKG R AXIS: 57 DEGREES
EKG T AXIS: 34 DEGREES
EKG T AXIS: 44 DEGREES
EKG VENTRICULAR RATE: 61 BPM
EKG VENTRICULAR RATE: 79 BPM
EOSINOPHILS RELATIVE PERCENT: 0 % (ref 1–4)
GFR AFRICAN AMERICAN: >60 ML/MIN
GFR NON-AFRICAN AMERICAN: >60 ML/MIN
GFR SERPL CREATININE-BSD FRML MDRD: ABNORMAL ML/MIN/{1.73_M2}
GFR SERPL CREATININE-BSD FRML MDRD: ABNORMAL ML/MIN/{1.73_M2}
GLUCOSE BLD-MCNC: 130 MG/DL (ref 70–99)
GLUCOSE URINE: NEGATIVE
HCT VFR BLD CALC: 41.1 % (ref 40.7–50.3)
HEMOGLOBIN: 13.6 G/DL (ref 13–17)
IMMATURE GRANULOCYTES: 1 %
KETONES, URINE: ABNORMAL
LEUKOCYTE ESTERASE, URINE: NEGATIVE
LYMPHOCYTES # BLD: 12 % (ref 24–43)
MCH RBC QN AUTO: 27.9 PG (ref 25.2–33.5)
MCHC RBC AUTO-ENTMCNC: 33.1 G/DL (ref 28.4–34.8)
MCV RBC AUTO: 84.4 FL (ref 82.6–102.9)
MONOCYTES # BLD: 2 % (ref 3–12)
NITRITE, URINE: NEGATIVE
NRBC AUTOMATED: 0 PER 100 WBC
PDW BLD-RTO: 15.9 % (ref 11.8–14.4)
PH UA: 6.5 (ref 5–8)
PLATELET # BLD: 234 K/UL (ref 138–453)
PLATELET ESTIMATE: ABNORMAL
PMV BLD AUTO: 9.5 FL (ref 8.1–13.5)
POTASSIUM SERPL-SCNC: 4.5 MMOL/L (ref 3.7–5.3)
PROTEIN UA: NEGATIVE
RBC # BLD: 4.87 M/UL (ref 4.21–5.77)
RBC # BLD: ABNORMAL 10*6/UL
SEG NEUTROPHILS: 85 % (ref 36–65)
SEGMENTED NEUTROPHILS ABSOLUTE COUNT: 4.9 K/UL (ref 1.5–8.1)
SODIUM BLD-SCNC: 138 MMOL/L (ref 135–144)
SPECIFIC GRAVITY UA: 1.02 (ref 1–1.03)
TROPONIN INTERP: NORMAL
TROPONIN T: NORMAL NG/ML
TROPONIN, HIGH SENSITIVITY: <6 NG/L (ref 0–22)
TURBIDITY: CLEAR
URINE HGB: NEGATIVE
UROBILINOGEN, URINE: NORMAL
WBC # BLD: 5.8 K/UL (ref 3.5–11.3)
WBC # BLD: ABNORMAL 10*3/UL

## 2021-03-30 PROCEDURE — G0378 HOSPITAL OBSERVATION PER HR: HCPCS

## 2021-03-30 PROCEDURE — 6370000000 HC RX 637 (ALT 250 FOR IP): Performed by: EMERGENCY MEDICINE

## 2021-03-30 PROCEDURE — 94640 AIRWAY INHALATION TREATMENT: CPT

## 2021-03-30 PROCEDURE — 6360000002 HC RX W HCPCS: Performed by: EMERGENCY MEDICINE

## 2021-03-30 PROCEDURE — 80048 BASIC METABOLIC PNL TOTAL CA: CPT

## 2021-03-30 PROCEDURE — 2580000003 HC RX 258: Performed by: EMERGENCY MEDICINE

## 2021-03-30 PROCEDURE — 93010 ELECTROCARDIOGRAM REPORT: CPT | Performed by: INTERNAL MEDICINE

## 2021-03-30 PROCEDURE — 36415 COLL VENOUS BLD VENIPUNCTURE: CPT

## 2021-03-30 PROCEDURE — 96372 THER/PROPH/DIAG INJ SC/IM: CPT

## 2021-03-30 PROCEDURE — 93005 ELECTROCARDIOGRAM TRACING: CPT | Performed by: EMERGENCY MEDICINE

## 2021-03-30 PROCEDURE — 6370000000 HC RX 637 (ALT 250 FOR IP): Performed by: STUDENT IN AN ORGANIZED HEALTH CARE EDUCATION/TRAINING PROGRAM

## 2021-03-30 PROCEDURE — 84484 ASSAY OF TROPONIN QUANT: CPT

## 2021-03-30 PROCEDURE — 6370000000 HC RX 637 (ALT 250 FOR IP): Performed by: NURSE PRACTITIONER

## 2021-03-30 PROCEDURE — 81003 URINALYSIS AUTO W/O SCOPE: CPT

## 2021-03-30 PROCEDURE — 85025 COMPLETE CBC W/AUTO DIFF WBC: CPT

## 2021-03-30 RX ORDER — PREDNISONE 10 MG/1
10 TABLET ORAL DAILY
Status: DISCONTINUED | OUTPATIENT
Start: 2021-04-05 | End: 2021-04-02 | Stop reason: HOSPADM

## 2021-03-30 RX ORDER — GABAPENTIN 400 MG/1
800 CAPSULE ORAL 3 TIMES DAILY
Qty: 90 CAPSULE | Refills: 0 | Status: ON HOLD | OUTPATIENT
Start: 2021-03-30 | End: 2021-04-12 | Stop reason: SDUPTHER

## 2021-03-30 RX ORDER — PREDNISONE 1 MG/1
5 TABLET ORAL DAILY
Status: DISCONTINUED | OUTPATIENT
Start: 2021-04-08 | End: 2021-04-02 | Stop reason: HOSPADM

## 2021-03-30 RX ORDER — METHYLPREDNISOLONE 4 MG/1
TABLET ORAL
Qty: 1 KIT | Refills: 0 | Status: SHIPPED | OUTPATIENT
Start: 2021-03-30 | End: 2021-03-31 | Stop reason: HOSPADM

## 2021-03-30 RX ORDER — LORAZEPAM 2 MG/ML
3 INJECTION INTRAMUSCULAR
Status: DISCONTINUED | OUTPATIENT
Start: 2021-03-30 | End: 2021-03-30

## 2021-03-30 RX ORDER — LAMOTRIGINE 100 MG/1
100 TABLET ORAL 2 TIMES DAILY
Status: DISCONTINUED | OUTPATIENT
Start: 2021-03-30 | End: 2021-04-02 | Stop reason: HOSPADM

## 2021-03-30 RX ORDER — LORAZEPAM 2 MG/ML
2 INJECTION INTRAMUSCULAR
Status: DISCONTINUED | OUTPATIENT
Start: 2021-03-30 | End: 2021-04-01

## 2021-03-30 RX ORDER — LORAZEPAM 2 MG/1
2 TABLET ORAL
Status: DISCONTINUED | OUTPATIENT
Start: 2021-03-30 | End: 2021-04-01

## 2021-03-30 RX ORDER — LORAZEPAM 1 MG/1
1 TABLET ORAL
Status: DISCONTINUED | OUTPATIENT
Start: 2021-03-30 | End: 2021-04-01

## 2021-03-30 RX ORDER — LORAZEPAM 2 MG/1
4 TABLET ORAL
Status: DISCONTINUED | OUTPATIENT
Start: 2021-03-30 | End: 2021-03-30

## 2021-03-30 RX ORDER — LORAZEPAM 2 MG/ML
1 INJECTION INTRAMUSCULAR
Status: DISCONTINUED | OUTPATIENT
Start: 2021-03-30 | End: 2021-04-01

## 2021-03-30 RX ORDER — SODIUM CHLORIDE 0.9 % (FLUSH) 0.9 %
10 SYRINGE (ML) INJECTION PRN
Status: DISCONTINUED | OUTPATIENT
Start: 2021-03-30 | End: 2021-04-02 | Stop reason: HOSPADM

## 2021-03-30 RX ORDER — BUSPIRONE HYDROCHLORIDE 15 MG/1
15 TABLET ORAL 3 TIMES DAILY
Status: DISCONTINUED | OUTPATIENT
Start: 2021-03-30 | End: 2021-04-02 | Stop reason: HOSPADM

## 2021-03-30 RX ORDER — ACETAMINOPHEN 325 MG/1
650 TABLET ORAL EVERY 4 HOURS PRN
Status: DISCONTINUED | OUTPATIENT
Start: 2021-03-30 | End: 2021-04-02 | Stop reason: HOSPADM

## 2021-03-30 RX ORDER — LORAZEPAM 2 MG/ML
3 INJECTION INTRAMUSCULAR
Status: DISCONTINUED | OUTPATIENT
Start: 2021-03-30 | End: 2021-04-01

## 2021-03-30 RX ORDER — ALBUTEROL SULFATE 90 UG/1
1-2 AEROSOL, METERED RESPIRATORY (INHALATION) EVERY 4 HOURS PRN
Qty: 1 INHALER | Refills: 1 | Status: ON HOLD | OUTPATIENT
Start: 2021-03-30 | End: 2021-04-12 | Stop reason: SDUPTHER

## 2021-03-30 RX ORDER — SODIUM CHLORIDE 9 MG/ML
25 INJECTION, SOLUTION INTRAVENOUS PRN
Status: DISCONTINUED | OUTPATIENT
Start: 2021-03-30 | End: 2021-04-02 | Stop reason: HOSPADM

## 2021-03-30 RX ORDER — AMITRIPTYLINE HYDROCHLORIDE 50 MG/1
50 TABLET, FILM COATED ORAL NIGHTLY
Status: DISCONTINUED | OUTPATIENT
Start: 2021-03-30 | End: 2021-04-02 | Stop reason: HOSPADM

## 2021-03-30 RX ORDER — LORAZEPAM 0.5 MG/1
2 TABLET ORAL
Status: DISCONTINUED | OUTPATIENT
Start: 2021-03-30 | End: 2021-03-30

## 2021-03-30 RX ORDER — DICYCLOMINE HYDROCHLORIDE 10 MG/ML
20 INJECTION INTRAMUSCULAR ONCE
Status: DISCONTINUED | OUTPATIENT
Start: 2021-03-30 | End: 2021-04-02 | Stop reason: HOSPADM

## 2021-03-30 RX ORDER — LORAZEPAM 2 MG/ML
1 INJECTION INTRAMUSCULAR ONCE
Status: DISCONTINUED | OUTPATIENT
Start: 2021-03-30 | End: 2021-04-01

## 2021-03-30 RX ORDER — QUETIAPINE 300 MG/1
300 TABLET, FILM COATED, EXTENDED RELEASE ORAL NIGHTLY
Status: ON HOLD | COMMUNITY
End: 2021-04-12 | Stop reason: HOSPADM

## 2021-03-30 RX ORDER — LORAZEPAM 2 MG/ML
4 INJECTION INTRAMUSCULAR
Status: DISCONTINUED | OUTPATIENT
Start: 2021-03-30 | End: 2021-03-30

## 2021-03-30 RX ORDER — LORAZEPAM 2 MG/ML
2 INJECTION INTRAMUSCULAR
Status: DISCONTINUED | OUTPATIENT
Start: 2021-03-30 | End: 2021-03-30

## 2021-03-30 RX ORDER — GABAPENTIN 400 MG/1
800 CAPSULE ORAL 3 TIMES DAILY
Status: DISCONTINUED | OUTPATIENT
Start: 2021-03-30 | End: 2021-04-02 | Stop reason: HOSPADM

## 2021-03-30 RX ORDER — TAMSULOSIN HYDROCHLORIDE 0.4 MG/1
0.4 CAPSULE ORAL DAILY
Status: DISCONTINUED | OUTPATIENT
Start: 2021-03-30 | End: 2021-04-02 | Stop reason: HOSPADM

## 2021-03-30 RX ORDER — POTASSIUM CHLORIDE 20 MEQ/1
40 TABLET, EXTENDED RELEASE ORAL ONCE
Status: COMPLETED | OUTPATIENT
Start: 2021-03-30 | End: 2021-03-30

## 2021-03-30 RX ORDER — PREDNISONE 20 MG/1
20 TABLET ORAL DAILY
Status: COMPLETED | OUTPATIENT
Start: 2021-03-30 | End: 2021-04-01

## 2021-03-30 RX ORDER — SODIUM CHLORIDE 0.9 % (FLUSH) 0.9 %
10 SYRINGE (ML) INJECTION EVERY 12 HOURS SCHEDULED
Status: DISCONTINUED | OUTPATIENT
Start: 2021-03-30 | End: 2021-04-02 | Stop reason: HOSPADM

## 2021-03-30 RX ORDER — LORAZEPAM 2 MG/ML
1 INJECTION INTRAMUSCULAR
Status: DISCONTINUED | OUTPATIENT
Start: 2021-03-30 | End: 2021-03-30

## 2021-03-30 RX ORDER — MAGNESIUM OXIDE 400 MG/1
400 TABLET ORAL DAILY
Status: ON HOLD | COMMUNITY
End: 2021-04-12 | Stop reason: HOSPADM

## 2021-03-30 RX ORDER — LORAZEPAM 2 MG/1
4 TABLET ORAL
Status: DISCONTINUED | OUTPATIENT
Start: 2021-03-30 | End: 2021-04-01

## 2021-03-30 RX ORDER — NAPROXEN 500 MG/1
500 TABLET ORAL 2 TIMES DAILY WITH MEALS
Status: ON HOLD | COMMUNITY
End: 2021-04-12 | Stop reason: SDUPTHER

## 2021-03-30 RX ORDER — ALBUTEROL SULFATE 2.5 MG/3ML
2.5 SOLUTION RESPIRATORY (INHALATION) EVERY 4 HOURS PRN
Status: DISCONTINUED | OUTPATIENT
Start: 2021-03-30 | End: 2021-04-02 | Stop reason: HOSPADM

## 2021-03-30 RX ORDER — HYDROXYZINE 50 MG/1
50 TABLET, FILM COATED ORAL 2 TIMES DAILY
Status: DISCONTINUED | OUTPATIENT
Start: 2021-03-30 | End: 2021-04-02 | Stop reason: HOSPADM

## 2021-03-30 RX ORDER — PROPRANOLOL HYDROCHLORIDE 80 MG/1
80 CAPSULE, EXTENDED RELEASE ORAL DAILY
Status: DISCONTINUED | OUTPATIENT
Start: 2021-03-30 | End: 2021-04-02 | Stop reason: HOSPADM

## 2021-03-30 RX ORDER — LEVETIRACETAM 500 MG/1
500 TABLET ORAL 2 TIMES DAILY
Status: ON HOLD | COMMUNITY
End: 2021-04-02 | Stop reason: SDUPTHER

## 2021-03-30 RX ORDER — LORAZEPAM 2 MG/ML
4 INJECTION INTRAMUSCULAR
Status: DISCONTINUED | OUTPATIENT
Start: 2021-03-30 | End: 2021-04-01

## 2021-03-30 RX ORDER — LORAZEPAM 0.5 MG/1
1 TABLET ORAL
Status: DISCONTINUED | OUTPATIENT
Start: 2021-03-30 | End: 2021-03-30

## 2021-03-30 RX ADMIN — AMITRIPTYLINE HYDROCHLORIDE 50 MG: 50 TABLET, FILM COATED ORAL at 20:42

## 2021-03-30 RX ADMIN — SODIUM CHLORIDE, PRESERVATIVE FREE 10 ML: 5 INJECTION INTRAVENOUS at 20:42

## 2021-03-30 RX ADMIN — SERTRALINE 50 MG: 50 TABLET, FILM COATED ORAL at 09:20

## 2021-03-30 RX ADMIN — ACETAMINOPHEN 650 MG: 325 TABLET ORAL at 17:47

## 2021-03-30 RX ADMIN — LORAZEPAM 1 MG: 0.5 TABLET ORAL at 04:31

## 2021-03-30 RX ADMIN — BUSPIRONE HYDROCHLORIDE 15 MG: 15 TABLET ORAL at 09:20

## 2021-03-30 RX ADMIN — Medication 2 PUFF: at 09:53

## 2021-03-30 RX ADMIN — ACETAMINOPHEN 650 MG: 325 TABLET ORAL at 13:29

## 2021-03-30 RX ADMIN — LORAZEPAM 1 MG: 0.5 TABLET ORAL at 17:46

## 2021-03-30 RX ADMIN — SODIUM CHLORIDE, PRESERVATIVE FREE 10 ML: 5 INJECTION INTRAVENOUS at 09:00

## 2021-03-30 RX ADMIN — GABAPENTIN 800 MG: 400 CAPSULE ORAL at 13:30

## 2021-03-30 RX ADMIN — LAMOTRIGINE 100 MG: 100 TABLET ORAL at 20:42

## 2021-03-30 RX ADMIN — PROPRANOLOL HYDROCHLORIDE 80 MG: 80 CAPSULE, EXTENDED RELEASE ORAL at 09:20

## 2021-03-30 RX ADMIN — LORAZEPAM 1 MG: 0.5 TABLET ORAL at 07:56

## 2021-03-30 RX ADMIN — HYDROXYZINE HYDROCHLORIDE 50 MG: 50 TABLET ORAL at 20:42

## 2021-03-30 RX ADMIN — ACETAMINOPHEN 650 MG: 325 TABLET ORAL at 07:56

## 2021-03-30 RX ADMIN — LORAZEPAM 1 MG: 0.5 TABLET ORAL at 13:30

## 2021-03-30 RX ADMIN — PREDNISONE 20 MG: 20 TABLET ORAL at 09:20

## 2021-03-30 RX ADMIN — BUSPIRONE HYDROCHLORIDE 15 MG: 15 TABLET ORAL at 20:42

## 2021-03-30 RX ADMIN — GABAPENTIN 800 MG: 400 CAPSULE ORAL at 09:20

## 2021-03-30 RX ADMIN — POTASSIUM CHLORIDE 40 MEQ: 1500 TABLET, EXTENDED RELEASE ORAL at 07:58

## 2021-03-30 RX ADMIN — ALBUTEROL SULFATE 2 PUFF: 90 AEROSOL, METERED RESPIRATORY (INHALATION) at 09:53

## 2021-03-30 RX ADMIN — GABAPENTIN 800 MG: 400 CAPSULE ORAL at 20:42

## 2021-03-30 RX ADMIN — LAMOTRIGINE 100 MG: 100 TABLET ORAL at 09:20

## 2021-03-30 RX ADMIN — BUSPIRONE HYDROCHLORIDE 15 MG: 15 TABLET ORAL at 13:30

## 2021-03-30 RX ADMIN — HYDROXYZINE HYDROCHLORIDE 50 MG: 50 TABLET ORAL at 09:21

## 2021-03-30 RX ADMIN — TAMSULOSIN HYDROCHLORIDE 0.4 MG: 0.4 CAPSULE ORAL at 09:21

## 2021-03-30 RX ADMIN — ENOXAPARIN SODIUM 40 MG: 40 INJECTION SUBCUTANEOUS at 09:23

## 2021-03-30 ASSESSMENT — ENCOUNTER SYMPTOMS
SORE THROAT: 0
ABDOMINAL DISTENTION: 0
CONSTIPATION: 0
NAUSEA: 0
SINUS PAIN: 0
EYE ITCHING: 0
COUGH: 0
EYE PAIN: 0
SHORTNESS OF BREATH: 1
DIARRHEA: 0
ABDOMINAL PAIN: 1
SINUS PRESSURE: 0

## 2021-03-30 ASSESSMENT — PAIN DESCRIPTION - LOCATION
LOCATION: BACK;GENERALIZED;OTHER (COMMENT)
LOCATION: BACK

## 2021-03-30 ASSESSMENT — PAIN SCALES - GENERAL
PAINLEVEL_OUTOF10: 7
PAINLEVEL_OUTOF10: 8
PAINLEVEL_OUTOF10: 6
PAINLEVEL_OUTOF10: 6
PAINLEVEL_OUTOF10: 0
PAINLEVEL_OUTOF10: 8
PAINLEVEL_OUTOF10: 0

## 2021-03-30 ASSESSMENT — PAIN DESCRIPTION - FREQUENCY: FREQUENCY: CONTINUOUS

## 2021-03-30 ASSESSMENT — PAIN DESCRIPTION - PAIN TYPE
TYPE: ACUTE PAIN;CHRONIC PAIN
TYPE: CHRONIC PAIN

## 2021-03-30 ASSESSMENT — PAIN DESCRIPTION - ONSET: ONSET: ON-GOING

## 2021-03-30 NOTE — H&P
[address]  CDU / OBSERVATION ENCOUNTER  RESIDENT NOTE     Pt Name: Noy Coreas  MRN: [id number]  [place] 1965  Date of evaluation: 3/30/21  Patient's PCP is : No primary care provider on file. CHIEF COMPLAINT       Chief Complaint   Patient presents with    Headache     negative covid test 5 days ago    Nausea         HISTORY OF PRESENT ILLNESS    Noy Coreas is a 50 y.o. male who presents for complaints including myalgias, arthralgias, headache, nausea and vomiting after binge drinking. Reports chills, shortness of breath with wheezing, pain with urination, numbness and tingling in his fingertips and concerned about recent Covid positive contact. Denies chest pain. ED work-up demonstrated negative Covid, unremarkable blood counts, mild hypokalemia to 3.5, negative urinalysis. Troponin stable at 7x2. BNP WNL. Normal EKG. Admitted for optimization of breathing and continued aerosols. Location/Symptom: malaise, SOB  Timing/Onset: 2 days  Provocation: unknown, binge drinking   Quality: NA  Radiation: NA  Severity: moderate  Timing/Duration: 2d  Modifying Factors: none    REVIEW OF SYSTEMS       Review of Systems   Constitutional: Positive for chills. Negative for activity change and fever. HENT: Negative for congestion, sinus pressure, sinus pain and sore throat. Eyes: Negative for pain and itching. Respiratory: Positive for shortness of breath. Negative for cough. Cardiovascular: Negative for chest pain. Gastrointestinal: Positive for abdominal pain. Negative for abdominal distention, constipation, diarrhea and nausea. Endocrine: Negative for polyuria. Genitourinary: Positive for dysuria. Negative for difficulty urinating and frequency. Musculoskeletal: Positive for arthralgias and myalgias. Skin: Negative for rash. Neurological: Positive for headaches. Negative for light-headedness.    Psychiatric/Behavioral:        Alcohol use         (PQRS) Advance directives on face sheet per hospital policy. No change unless specifically mentioned in chart    PAST MEDICAL HISTORY    has a past medical history of COPD (chronic obstructive pulmonary disease) ([facility] [address] Hep B w/ coma, and Seizures ([facility] [address] I have reviewed the past medical history with the patient and it is pertinent to this complaint. SURGICAL HISTORY      has a past surgical history that includes hernia repair and back surgery. I have reviewed and agree with Surgical History entered and it is pertinent to this complaint. CURRENT MEDICATIONS     sodium chloride flush 0.9 % injection 10 mL, 2 times per day  sodium chloride flush 0.9 % injection 10 mL, PRN  0.9 % sodium chloride infusion, PRN  LORazepam (ATIVAN) tablet 1 mg, Q1H PRN    Or  LORazepam (ATIVAN) injection 1 mg, Q1H PRN  dicyclomine (BENTYL) injection 20 mg, Once  LORazepam (ATIVAN) injection 1 mg, Once  amitriptyline (ELAVIL) tablet 50 mg, Nightly  busPIRone (BUSPAR) tablet 15 mg, TID  hydrOXYzine (ATARAX) tablet 50 mg, BID  propranolol (INDERAL LA) extended release capsule 80 mg, Daily  sertraline (ZOLOFT) tablet 50 mg, Daily  albuterol (PROVENTIL) nebulizer solution 2.5 mg, Q4H PRN  tamsulosin (FLOMAX) capsule 0.4 mg, Daily  lamoTRIgine (LAMICTAL) tablet 100 mg, BID  sodium chloride flush 0.9 % injection 10 mL, 2 times per day  sodium chloride flush 0.9 % injection 10 mL, PRN  0.9 % sodium chloride infusion, PRN  enoxaparin (LOVENOX) injection 40 mg, Daily  acetaminophen (TYLENOL) tablet 650 mg, Q4H PRN  albuterol sulfate  (90 Base) MCG/ACT inhaler 2 puff, Q4H PRN  ipratropium (ATROVENT HFA) 17 MCG/ACT inhaler 2 puff, Q4H PRN        All medication charted and reviewed. ALLERGIES     has No Known Allergies. FAMILY HISTORY     has no family status information on file. family history is not on file. The patient denies any pertinent family history. I have reviewed and agree with the family history entered.   I have reviewed the Family History and it is not significant to the case    SOCIAL HISTORY      reports that he has been smoking cigarettes. He has been smoking about 1.00 pack per day. He has never used smokeless tobacco. He reports current alcohol use. He reports current drug use. Drug: Marijuana. I have reviewed and agree with all Social.  There are concerns for substance abuse/use. PHYSICAL EXAM     INITIAL VITALS:  height is 5' 10\" (1.778 m) and weight is 170 lb (77.1 kg). His oral temperature is 98.1 °F (36.7 °C). His blood pressure is 140/90 (abnormal) and his pulse is 65. His respiration is 16 and oxygen saturation is 95%. Physical Exam  Vitals signs reviewed. Constitutional:       General: He is not in acute distress. HENT:      Head: Normocephalic and atraumatic. Right Ear: External ear normal.      Left Ear: External ear normal.      Ears:      Comments: Hearing grossly normal     Nose: Nose normal.      Mouth/Throat:      Mouth: Mucous membranes are moist.      Pharynx: Oropharynx is clear. Eyes:      General: No scleral icterus. Conjunctiva/sclera: Conjunctivae normal.      Pupils: Pupils are equal, round, and reactive to light. Neck:      Musculoskeletal: No muscular tenderness. Cardiovascular:      Rate and Rhythm: Normal rate and regular rhythm. Pulses: Normal pulses. Pulmonary:      Effort: Pulmonary effort is normal. No respiratory distress. Breath sounds: Wheezing present. Comments: Fuhs inspiratory /expiratory wheezes  Abdominal:      General: There is no distension. Tenderness: There is no abdominal tenderness. There is no guarding. Musculoskeletal:      Right lower leg: No edema. Left lower leg: No edema. Skin:     General: Skin is warm and dry. Capillary Refill: Capillary refill takes less than 2 seconds. Neurological:      General: No focal deficit present. Mental Status: He is alert and oriented to person, place, and time.  Mental status is at baseline. Psychiatric:         Mood and Affect: Mood normal.           DIFFERENTIAL DIAGNOSIS/MDM:     Patient presented for multiple complaints including arthralgias, myalgias, shortness of breath, numbness and tingling in his fingers and dysuria. ED work-up demonstrated normal blood counts, mild hypokalemia to 3.5 but otherwise normal renal function, negative urinalysis, troponin stable at 7. EKG normal.  Admitted for symptom management and further cardiac evaluation. DIAGNOSTIC RESULTS     EKG: All EKG's are interpreted by the Observation Physician who either signs or Co-signs this chart in the absence of a cardiologist.    EKG Interpretation      Interpreted by observation physician    Rhythm: normal sinus   Rate: normal  Axis: normal  Ectopy: none  Conduction: normal  ST Segments: normal  T Waves: flattening in  v1, v2, III and aVl and inversion in  v1 and aVr  Q Waves: none    Clinical Impression: no acute changes and non-specific EKG, resolution of previous conduction delay    Karolina Cervantes, DO      RADIOLOGY:   I directly visualized the following  images and reviewed the radiologist interpretations:    Xr Chest Portable    Result Date: 3/29/2021  EXAMINATION: ONE XRAY VIEW OF THE CHEST 3/29/2021 4:40 pm COMPARISON: January 18, 2020 HISTORY: ORDERING SYSTEM PROVIDED HISTORY: Poss covid - diffuse wheezing, SOA TECHNOLOGIST PROVIDED HISTORY: Poss covid - diffuse wheezing, SOA Reason for Exam: vomitting, dizziness, nausea  upright port FINDINGS: Adequate inspiration is present. No focal area of consolidation or pneumothorax is present. Heart size and mediastinal contours are stable. Multiple old right rib fractures are again visualized. Stable chest radiograph, without evidence of acute cardiopulmonary disease       LABS:  I have reviewed and interpreted all available lab results.   Labs Reviewed   CBC WITH AUTO DIFFERENTIAL - Abnormal; Notable for the following components:       Result Value RDW 15.9 (*)     All other components within normal limits   COMPREHENSIVE METABOLIC PANEL W/ REFLEX TO MG FOR LOW K - Abnormal; Notable for the following components:    Potassium 3.5 (*)     Total Protein 8.5 (*)     All other components within normal limits   URINE RT REFLEX TO CULTURE - Abnormal; Notable for the following components:    Ketones, Urine TRACE (*)     All other components within normal limits   ETHANOL - Abnormal; Notable for the following components:    Ethanol 35 (*)     Ethanol percent 0.035 (*)     All other components within normal limits   COVID-19, RAPID   LIPASE   TROPONIN   BRAIN NATRIURETIC PEPTIDE   BETA-HYDROXYBUTYRATE   CK   MYOGLOBIN, SERUM   TROPONIN   MAGNESIUM   CBC WITH AUTO DIFFERENTIAL   BASIC METABOLIC PANEL   TROPONIN       SCREENING TOOLS:    HEART Risk Score for Chest Pain Patients   History and Physical Exam Suspicion Level  (Nausea, Vomiting, Diaphoresis, Radiation, Exertion)   Slightly Suspicious (0 pts)   Moderately Suspicious (1 pt)   Highly Suspicious (2 pts)   EKG Interpretation   Normal (0 pts)   Non-Specific Repolarization Disturbance (1 pt)   Significant ST-Depression (2 pts)   Age of Patient (in years)   = 39 (0 pts)   46-64 (1 pt)   = 65 (2 pts)   Risk Factors   No Risk Factors (0 pts)   1-2 Risk Factors (1 pt)   = 3 Risk Factors (2 pts)   Risk Factors Include:   Hypercholesterolemia   Hypertension   Diabetes Mellitus   Cigarette smoking   Positive family history   Obesity   CAD   (SLE, CKDz, HIV, Cocaine abuse)   Troponin Levels   = Normal Limit (0 pts)   1-3 Times Normal Limit (1 pt)   > 3 Times Normal Limit (2 pts)  TOTAL:    Percent Risk for Major Adverse Cardiac Event (MACE)  0-3 pts indicates low risk for MACE   2.5% (DISCHARGE)   4-7 pts indicates moderate risk for MACE  20.3% (OBS)  8-10 pts indicates high risk for MACE  72.7% (EARLY INVASIVE TX)    CDU MOHINDER / Jaja Badillo is a 50 y.o. male who presents with multiple complaints.   ED work-up negative. Admitted for further cardiac evaluation. · SOB  · Aerosols PRN  · BNP WNL  · Stable troponin, normal EKG   · Alcohol use  · CIWA protocol  · Continue home medications and pain control  · Monitor vitals, labs, and imaging  · DISPO: pending consults and clinical improvement    CONSULTS:    IP CONSULT TO SOCIAL WORK  IP CONSULT TO CARDIOLOGY    PROCEDURES:  Not indicated       PATIENT REFERRED TO:    No follow-up provider specified. --  Messi Flores DO   Emergency Medicine Resident     This dictation was generated by voice recognition computer software. Although all attempts are made to edit the dictation for accuracy, there may be errors in the transcription that are not intended.

## 2021-03-30 NOTE — DISCHARGE SUMMARY
(KEPPRA) 500 MG tablet  Take 1 tablet by mouth 2 times daily             levETIRAcetam (KEPPRA) 500 MG tablet  Take 1 tablet by mouth 2 times daily             LORazepam (ATIVAN) 0.5 MG tablet  Take 1 tablet by mouth every 8 hours as needed for Anxiety for up to 30 days. magnesium oxide (MAG-OX) 400 (241.3 Mg) MG TABS tablet  Take 1 tablet by mouth daily             magnesium oxide (MAG-OX) 400 MG tablet  Take 400 mg by mouth daily             naproxen (NAPROSYN) 500 MG tablet  Take 500 mg by mouth 2 times daily (with meals)             predniSONE (DELTASONE) 10 MG tablet  Take 1 tablet by mouth daily for 3 days 4/5-4/7             predniSONE (DELTASONE) 5 MG tablet  Take 3 tablets by mouth daily for 2 days Start tomorrow 4/3             predniSONE (DELTASONE) 5 MG tablet  Take 1 tablet by mouth daily for 3 days 4/8-4/10             propranolol (INDERAL) 80 MG tablet  Take 80 mg by mouth daily              QUEtiapine (SEROQUEL XR) 300 MG extended release tablet  Take 300 mg by mouth nightly             sertraline (ZOLOFT) 50 MG tablet  Take 50 mg by mouth daily             tamsulosin (FLOMAX) 0.4 MG capsule  Take 1 capsule by mouth daily for 10 days                 Diet:  No diet orders on file , Advance as tolerated     Activity:  As tolerated    Consultants: IP CONSULT TO SOCIAL WORK  IP CONSULT TO SOCIAL WORK  IP CONSULT TO PSYCHIATRY  IP CONSULT TO SOCIAL WORK  IP CONSULT TO SOCIAL WORK  IP CONSULT TO SOCIAL WORK  IP CONSULT TO PSYCHIATRY    Procedures:  Not indicated     Diagnostic Test:   Results for orders placed or performed during the hospital encounter of 03/29/21   COVID-19, Rapid    Specimen: Nasopharyngeal Swab   Result Value Ref Range    Specimen Description . NASOPHARYNGEAL SWAB     SARS-CoV-2, Rapid Not Detected Not Detected   CBC Auto Differential   Result Value Ref Range    WBC 9.3 3.5 - 11.3 k/uL    RBC 5.23 4.21 - 5.77 m/uL    Hemoglobin 14.9 13.0 - 17.0 g/dL    Hematocrit 43.8 40.7 - 50.3 %    MCV 83.7 82.6 - 102.9 fL    MCH 28.5 25.2 - 33.5 pg    MCHC 34.0 28.4 - 34.8 g/dL    RDW 15.9 (H) 11.8 - 14.4 %    Platelets 888 914 - 449 k/uL    MPV 11.7 8.1 - 13.5 fL    NRBC Automated 0.0 0.0 per 100 WBC    Differential Type NOT REPORTED     Seg Neutrophils 57 36 - 65 %    Lymphocytes 32 24 - 43 %    Monocytes 10 3 - 12 %    Eosinophils % 1 1 - 4 %    Basophils 0 0 - 2 %    Immature Granulocytes 0 0 %    Segs Absolute 5.21 1.50 - 8.10 k/uL    Absolute Lymph # 2.97 1.10 - 3.70 k/uL    Absolute Mono # 0.96 0.10 - 1.20 k/uL    Absolute Eos # 0.06 0.00 - 0.44 k/uL    Basophils Absolute 0.04 0.00 - 0.20 k/uL    Absolute Immature Granulocyte <0.03 0.00 - 0.30 k/uL    WBC Morphology NOT REPORTED     RBC Morphology ANISOCYTOSIS PRESENT     Platelet Estimate NOT REPORTED    Comprehensive Metabolic Panel w/ Reflex to MG   Result Value Ref Range    Glucose 94 70 - 99 mg/dL    BUN 14 6 - 20 mg/dL    CREATININE 0.95 0.70 - 1.20 mg/dL    Bun/Cre Ratio NOT REPORTED 9 - 20    Calcium 10.1 8.6 - 10.4 mg/dL    Sodium 139 135 - 144 mmol/L    Potassium 3.5 (L) 3.7 - 5.3 mmol/L    Chloride 99 98 - 107 mmol/L    CO2 24 20 - 31 mmol/L    Anion Gap 16 9 - 17 mmol/L    Alkaline Phosphatase 92 40 - 129 U/L    ALT 12 5 - 41 U/L    AST 26 <40 U/L    Total Bilirubin 0.34 0.3 - 1.2 mg/dL    Total Protein 8.5 (H) 6.4 - 8.3 g/dL    Albumin 4.6 3.5 - 5.2 g/dL    Albumin/Globulin Ratio 1.2 1.0 - 2.5    GFR Non-African American >60 >60 mL/min    GFR African American >60 >60 mL/min    GFR Comment          GFR Staging NOT REPORTED    Lipase   Result Value Ref Range    Lipase 29 13 - 60 U/L   Troponin   Result Value Ref Range    Troponin, High Sensitivity 7 0 - 22 ng/L    Troponin T NOT REPORTED <0.03 ng/mL    Troponin Interp NOT REPORTED    Brain Natriuretic Peptide   Result Value Ref Range    Pro-BNP 97 <300 pg/mL    BNP Interpretation Pro-BNP Reference Range:    Urinalysis Reflex to Culture    Specimen: Urine, clean catch   Result Value Ref Range    Color, UA YELLOW YELLOW    Turbidity UA CLEAR CLEAR    Glucose, Ur NEGATIVE NEGATIVE    Bilirubin Urine NEGATIVE NEGATIVE    Ketones, Urine TRACE (A) NEGATIVE    Specific Gravity, UA 1.020 1.005 - 1.030    Urine Hgb NEGATIVE NEGATIVE    pH, UA 6.5 5.0 - 8.0    Protein, UA NEGATIVE NEGATIVE    Urobilinogen, Urine Normal Normal    Nitrite, Urine NEGATIVE NEGATIVE    Leukocyte Esterase, Urine NEGATIVE NEGATIVE    Urinalysis Comments       Microscopic exam not performed based on chemical results unless requested in original order.    BETA-HYDROXYBUTYRATE   Result Value Ref Range    Beta-Hydroxybutyrate 0.18 0.02 - 0.27 mmol/L   Ethanol   Result Value Ref Range    Ethanol 35 (H) <10 mg/dL    Ethanol percent 0.035 (H) <0.010 %   CK   Result Value Ref Range    Total  39 - 308 U/L   Myoglobin, Serum   Result Value Ref Range    Myoglobin 63 28 - 72 ng/mL   Troponin   Result Value Ref Range    Troponin, High Sensitivity 7 0 - 22 ng/L    Troponin T NOT REPORTED <0.03 ng/mL    Troponin Interp NOT REPORTED    Magnesium   Result Value Ref Range    Magnesium 2.1 1.6 - 2.6 mg/dL   CBC WITH AUTO DIFFERENTIAL   Result Value Ref Range    WBC 5.8 3.5 - 11.3 k/uL    RBC 4.87 4.21 - 5.77 m/uL    Hemoglobin 13.6 13.0 - 17.0 g/dL    Hematocrit 41.1 40.7 - 50.3 %    MCV 84.4 82.6 - 102.9 fL    MCH 27.9 25.2 - 33.5 pg    MCHC 33.1 28.4 - 34.8 g/dL    RDW 15.9 (H) 11.8 - 14.4 %    Platelets 310 797 - 336 k/uL    MPV 9.5 8.1 - 13.5 fL    NRBC Automated 0.0 0.0 per 100 WBC    Differential Type NOT REPORTED     WBC Morphology NOT REPORTED     RBC Morphology ANISOCYTOSIS PRESENT     Platelet Estimate NOT REPORTED     Seg Neutrophils 85 (H) 36 - 65 %    Lymphocytes 12 (L) 24 - 43 %    Monocytes 2 (L) 3 - 12 %    Eosinophils % 0 (L) 1 - 4 %    Basophils 0 0 - 2 %    Immature Granulocytes 1 (H) 0 %    Segs Absolute 4.90 1.50 - 8.10 k/uL    Absolute Lymph # 0.71 (L) 1.10 - 3.70 k/uL    Absolute Mono # 0.11 0.10 - 1.20 k/uL Absolute Eos # <0.03 0.00 - 0.44 k/uL    Basophils Absolute <0.03 0.00 - 0.20 k/uL    Absolute Immature Granulocyte 0.03 0.00 - 0.30 k/uL   BASIC METABOLIC PANEL   Result Value Ref Range    Glucose 130 (H) 70 - 99 mg/dL    BUN 12 6 - 20 mg/dL    CREATININE 0.84 0.70 - 1.20 mg/dL    Bun/Cre Ratio NOT REPORTED 9 - 20    Calcium 9.6 8.6 - 10.4 mg/dL    Sodium 138 135 - 144 mmol/L    Potassium 4.5 3.7 - 5.3 mmol/L    Chloride 104 98 - 107 mmol/L    CO2 21 20 - 31 mmol/L    Anion Gap 13 9 - 17 mmol/L    GFR Non-African American >60 >60 mL/min    GFR African American >60 >60 mL/min    GFR Comment          GFR Staging NOT REPORTED    Troponin   Result Value Ref Range    Troponin, High Sensitivity <6 0 - 22 ng/L    Troponin T NOT REPORTED <0.03 ng/mL    Troponin Interp NOT REPORTED    EKG 12 Lead   Result Value Ref Range    Ventricular Rate 61 BPM    Atrial Rate 61 BPM    P-R Interval 136 ms    QRS Duration 98 ms    Q-T Interval 430 ms    QTc Calculation (Bazett) 432 ms    P Axis 72 degrees    R Axis 57 degrees    T Axis 44 degrees   EKG 12 Lead   Result Value Ref Range    Ventricular Rate 79 BPM    Atrial Rate 79 BPM    P-R Interval 132 ms    QRS Duration 94 ms    Q-T Interval 402 ms    QTc Calculation (Bazett) 460 ms    P Axis 73 degrees    R Axis 24 degrees    T Axis 34 degrees     Xr Chest Portable    Result Date: 3/29/2021  EXAMINATION: ONE XRAY VIEW OF THE CHEST 3/29/2021 4:40 pm COMPARISON: January 18, 2020 HISTORY: ORDERING SYSTEM PROVIDED HISTORY: Poss covid - diffuse wheezing, SOA TECHNOLOGIST PROVIDED HISTORY: Poss covid - diffuse wheezing, SOA Reason for Exam: vomitting, dizziness, nausea  upright port FINDINGS: Adequate inspiration is present. No focal area of consolidation or pneumothorax is present. Heart size and mediastinal contours are stable. Multiple old right rib fractures are again visualized.      Stable chest radiograph, without evidence of acute cardiopulmonary disease           Physical Exam:    General appearance - NAD, AOx 3   Lungs -CTAB, no R/R/R  Heart - RRR, no M/R/G  Abdomen - Soft, NT/ND  Neurological:  MAEx4, No focal motor deficit, sensory loss  Extremities - Cap refil <2 sec in all ext., no edema  Skin -warm, dry      Hospital Course:  Clinical course has improved, labs and imaging reviewed. Hakeem White originally presented to the hospital on 3/29/2021  9:36 PM. with for complaints including myalgias, arthralgias, headache, nausea and vomiting after binge drinking. Reports chills, shortness of breath with wheezing, pain with urination, numbness and tingling in his fingertips and concerned about recent Covid positive contact. Denies chest pain. ED work-up demonstrated negative Covid, unremarkable blood counts, mild hypokalemia to 3.5, negative urinalysis. Troponin stable at 7x2. BNP WNL. Normal EKG. Admitted for optimization of breathing and continued aerosols. At that time it was determined that He required further observation and was started on steroids and continued nebulizers with improvement of symptoms. He was also seen by  and given resources for alcohol rehab. He was admitted and labs and imaging were followed daily. Imaging results as above. He is medically stable to be discharged. Disposition: Home    Patient stated that they will not drive themselves home from the hospital if they have gotten pain killers/ narcotics earlier that day and that they will arrange for transportation on their own or work with the  for a ride. Patient counseled NOT to drive while under the influence of narcotics/ pain killers. Condition: Good    Patient stable and ready for discharge home. I have discussed plan of care with patient and they are in understanding. They were instructed to read discharge paperwork. All of their questions and concerns were addressed.      Time Spent: 2 day      --  Chidi Zuniga,   Emergency Medicine Resident Physician    This dictation was generated by voice recognition computer software. Although all attempts are made to edit the dictation for accuracy, there may be errors in the transcription that are not intended.

## 2021-03-30 NOTE — ED PROVIDER NOTES
on file    Years of education: Not on file    Highest education level: Not on file   Occupational History    Not on file   Social Needs    Financial resource strain: Not on file    Food insecurity     Worry: Not on file     Inability: Not on file    Transportation needs     Medical: Not on file     Non-medical: Not on file   Tobacco Use    Smoking status: Current Every Day Smoker     Packs/day: 1.00     Types: Cigarettes    Smokeless tobacco: Never Used   Substance and Sexual Activity    Alcohol use: Yes     Frequency: Never    Drug use: Yes     Types: Marijuana    Sexual activity: Not on file   Lifestyle    Physical activity     Days per week: Not on file     Minutes per session: Not on file    Stress: Not on file   Relationships    Social connections     Talks on phone: Not on file     Gets together: Not on file     Attends Confucianist service: Not on file     Active member of club or organization: Not on file     Attends meetings of clubs or organizations: Not on file     Relationship status: Not on file    Intimate partner violence     Fear of current or ex partner: Not on file     Emotionally abused: Not on file     Physically abused: Not on file     Forced sexual activity: Not on file   Other Topics Concern    Not on file   Social History Narrative    Not on file       History reviewed. No pertinent family history. Allergies:  Patient has no known allergies. Home Medications:  Prior to Admission medications    Medication Sig Start Date End Date Taking?  Authorizing Provider   levETIRAcetam (KEPPRA) 500 MG tablet Take 500 mg by mouth 2 times daily   Yes Historical Provider, MD   QUEtiapine (SEROQUEL XR) 300 MG extended release tablet Take 300 mg by mouth nightly   Yes Historical Provider, MD   naproxen (NAPROSYN) 500 MG tablet Take 500 mg by mouth 2 times daily (with meals)   Yes Historical Provider, MD   magnesium oxide (MAG-OX) 400 MG tablet Take 400 mg by mouth daily   Yes Historical arthralgias and myalgias. Neurological: Positive for numbness. Negative for weakness. Psychiatric/Behavioral: Positive for dysphoric mood. Negative for agitation, confusion and suicidal ideas (Patient states \"I do not have much to live for\"). PHYSICAL EXAM   (up to 7 for level 4, 8 or more for level 5)      INITIAL VITALS:   BP (!) 140/90   Pulse 65   Temp 98.1 °F (36.7 °C) (Oral)   Resp 16   Ht 5' 10\" (1.778 m)   Wt 170 lb (77.1 kg)   SpO2 95%   BMI 24.39 kg/m²     Physical Exam  Vitals signs and nursing note reviewed. Constitutional:       General: He is not in acute distress. Appearance: Normal appearance. He is well-developed. He is not ill-appearing or diaphoretic. HENT:      Head: Normocephalic and atraumatic. Right Ear: Tympanic membrane, ear canal and external ear normal.      Left Ear: Tympanic membrane, ear canal and external ear normal.      Nose: Nose normal.      Mouth/Throat:      Mouth: Mucous membranes are moist.   Eyes:      Extraocular Movements: Extraocular movements intact. Conjunctiva/sclera: Conjunctivae normal.   Neck:      Musculoskeletal: Normal range of motion and neck supple. Trachea: No tracheal deviation. Cardiovascular:      Rate and Rhythm: Regular rhythm. Tachycardia present. Heart sounds: Normal heart sounds. No murmur. No friction rub. No gallop. Pulmonary:      Comments: Increased respiratory effort with diffuse wheezes that are heard without the stethoscope. Patient is speaking in choppy sentences secondary to shortness of breath  Abdominal:      General: Abdomen is flat. There is no distension. Palpations: Abdomen is soft. There is no mass. Tenderness: There is abdominal tenderness (Diffuse). There is no guarding or rebound. Musculoskeletal: Normal range of motion. General: No swelling, deformity or signs of injury. Skin:     General: Skin is warm and dry. Coloration: Skin is not jaundiced. Findings: No bruising or lesion. Neurological:      General: No focal deficit present. Mental Status: He is alert and oriented to person, place, and time. Mental status is at baseline. Motor: No abnormal muscle tone.          DIFFERENTIAL  DIAGNOSIS     PLAN (LABS / IMAGING / EKG):  Orders Placed This Encounter   Procedures    COVID-19, Rapid    XR CHEST PORTABLE    CBC Auto Differential    Comprehensive Metabolic Panel w/ Reflex to MG    Lipase    Troponin    Brain Natriuretic Peptide    Urinalysis Reflex to Culture    BETA-HYDROXYBUTYRATE    Ethanol    CK    Myoglobin, Serum    Troponin    Magnesium    Notify physician    Inpatient consult to Social Work    Respiratory care evaluation only    MDI Treatment    MDI Treatment    Initiate Oxygen Therapy Protocol    EKG 12 Lead    Insert peripheral IV    PATIENT STATUS (FROM ED OR OR/PROCEDURAL) Observation    Alcohol and or drug assessment    Seizure precautions    Fall precautions       MEDICATIONS ORDERED:  Orders Placed This Encounter   Medications    methylPREDNISolone sodium (SOLU-MEDROL) injection 125 mg    thiamine mononitrate tablet 881 mg    folic acid (FOLVITE) tablet 1 mg    0.9 % sodium chloride bolus    ondansetron (ZOFRAN) injection 4 mg    albuterol sulfate  (90 Base) MCG/ACT inhaler 2 puff    ipratropium (ATROVENT HFA) 17 MCG/ACT inhaler 2 puff    sodium chloride flush 0.9 % injection 10 mL    sodium chloride flush 0.9 % injection 10 mL    0.9 % sodium chloride infusion    OR Linked Order Group     LORazepam (ATIVAN) tablet 1 mg     LORazepam (ATIVAN) injection 1 mg    DISCONTD: LORazepam (ATIVAN) tablet 2 mg    DISCONTD: LORazepam (ATIVAN) injection 2 mg    DISCONTD: LORazepam (ATIVAN) tablet 3 mg    DISCONTD: LORazepam (ATIVAN) injection 3 mg    DISCONTD: LORazepam (ATIVAN) tablet 4 mg    DISCONTD: LORazepam (ATIVAN) injection 4 mg    dicyclomine (BENTYL) injection 20 mg    LORazepam (ATIVAN) injection 1 mg       DDX: Makayla Grande is a 50 y.o. male who presents to the emergency department with headache, myalgias and arthralgias, chills, concern for Covid in the setting of alcohol abuse. Differential diagnosis includes alcoholic ketoacidosis, starvation ketosis, COPD exacerbation, CHF, pneumonia, alcohol withdrawal, Covid, sepsis    DIAGNOSTIC RESULTS / EMERGENCY DEPARTMENT COURSE / MDM   LAB RESULTS:  Results for orders placed or performed during the hospital encounter of 03/29/21   COVID-19, Rapid    Specimen: Nasopharyngeal Swab   Result Value Ref Range    Specimen Description . NASOPHARYNGEAL SWAB     SARS-CoV-2, Rapid Not Detected Not Detected   CBC Auto Differential   Result Value Ref Range    WBC 9.3 3.5 - 11.3 k/uL    RBC 5.23 4.21 - 5.77 m/uL    Hemoglobin 14.9 13.0 - 17.0 g/dL    Hematocrit 43.8 40.7 - 50.3 %    MCV 83.7 82.6 - 102.9 fL    MCH 28.5 25.2 - 33.5 pg    MCHC 34.0 28.4 - 34.8 g/dL    RDW 15.9 (H) 11.8 - 14.4 %    Platelets 572 320 - 008 k/uL    MPV 11.7 8.1 - 13.5 fL    NRBC Automated 0.0 0.0 per 100 WBC    Differential Type NOT REPORTED     Seg Neutrophils 57 36 - 65 %    Lymphocytes 32 24 - 43 %    Monocytes 10 3 - 12 %    Eosinophils % 1 1 - 4 %    Basophils 0 0 - 2 %    Immature Granulocytes 0 0 %    Segs Absolute 5.21 1.50 - 8.10 k/uL    Absolute Lymph # 2.97 1.10 - 3.70 k/uL    Absolute Mono # 0.96 0.10 - 1.20 k/uL    Absolute Eos # 0.06 0.00 - 0.44 k/uL    Basophils Absolute 0.04 0.00 - 0.20 k/uL    Absolute Immature Granulocyte <0.03 0.00 - 0.30 k/uL    WBC Morphology NOT REPORTED     RBC Morphology ANISOCYTOSIS PRESENT     Platelet Estimate NOT REPORTED    Comprehensive Metabolic Panel w/ Reflex to MG   Result Value Ref Range    Glucose 94 70 - 99 mg/dL    BUN 14 6 - 20 mg/dL    CREATININE 0.95 0.70 - 1.20 mg/dL    Bun/Cre Ratio NOT REPORTED 9 - 20    Calcium 10.1 8.6 - 10.4 mg/dL    Sodium 139 135 - 144 mmol/L    Potassium 3.5 (L) 3.7 - 5.3 mmol/L    Chloride 99 98 - 107 mmol/L    CO2 24 20 - 31 mmol/L    Anion Gap 16 9 - 17 mmol/L    Alkaline Phosphatase 92 40 - 129 U/L    ALT 12 5 - 41 U/L    AST 26 <40 U/L    Total Bilirubin 0.34 0.3 - 1.2 mg/dL    Total Protein 8.5 (H) 6.4 - 8.3 g/dL    Albumin 4.6 3.5 - 5.2 g/dL    Albumin/Globulin Ratio 1.2 1.0 - 2.5    GFR Non-African American >60 >60 mL/min    GFR African American >60 >60 mL/min    GFR Comment          GFR Staging NOT REPORTED    Lipase   Result Value Ref Range    Lipase 29 13 - 60 U/L   Troponin   Result Value Ref Range    Troponin, High Sensitivity 7 0 - 22 ng/L    Troponin T NOT REPORTED <0.03 ng/mL    Troponin Interp NOT REPORTED    Brain Natriuretic Peptide   Result Value Ref Range    Pro-BNP 97 <300 pg/mL    BNP Interpretation Pro-BNP Reference Range:    Urinalysis Reflex to Culture    Specimen: Urine, clean catch   Result Value Ref Range    Color, UA YELLOW YELLOW    Turbidity UA CLEAR CLEAR    Glucose, Ur NEGATIVE NEGATIVE    Bilirubin Urine NEGATIVE NEGATIVE    Ketones, Urine TRACE (A) NEGATIVE    Specific Gravity, UA 1.020 1.005 - 1.030    Urine Hgb NEGATIVE NEGATIVE    pH, UA 6.5 5.0 - 8.0    Protein, UA NEGATIVE NEGATIVE    Urobilinogen, Urine Normal Normal    Nitrite, Urine NEGATIVE NEGATIVE    Leukocyte Esterase, Urine NEGATIVE NEGATIVE    Urinalysis Comments       Microscopic exam not performed based on chemical results unless requested in original order.    BETA-HYDROXYBUTYRATE   Result Value Ref Range    Beta-Hydroxybutyrate 0.18 0.02 - 0.27 mmol/L   Ethanol   Result Value Ref Range    Ethanol 35 (H) <10 mg/dL    Ethanol percent 0.035 (H) <0.010 %   CK   Result Value Ref Range    Total  39 - 308 U/L   Myoglobin, Serum   Result Value Ref Range    Myoglobin 63 28 - 72 ng/mL   Troponin   Result Value Ref Range    Troponin, High Sensitivity 7 0 - 22 ng/L    Troponin T NOT REPORTED <0.03 ng/mL    Troponin Interp NOT REPORTED    Magnesium   Result Value Ref Range    Magnesium 2.1 1.6 - 2.6 mg/dL   EKG 12 Lead   Result Value Ref Range    Ventricular Rate 61 BPM    Atrial Rate 61 BPM    P-R Interval 136 ms    QRS Duration 98 ms    Q-T Interval 430 ms    QTc Calculation (Bazett) 432 ms    P Axis 72 degrees    R Axis 57 degrees    T Axis 44 degrees       IMPRESSION: Ruthie Stallworth is a 50 y.o. male who presents to the emergency department with headache, myalgias and arthralgias, chills, concern for Covid in the setting of alcohol abuse. On examination he is afebrile, vital signs demonstrate borderline tachycardia with heart rate 90s on my evaluation and examination demonstrates an uncomfortable appearing male who is speaking in short sentences due to respiratory difficulty and severe wheezing. History is concerning for cardiopulmonary pathology on top of potential infectious, aspiration pneumonia, or rhabdomyolysis picture, so we will obtain comprehensive work-up. Patient will likely meet criteria for admission. RADIOLOGY:  No results found. EKG  EKG Interpretation    Interpreted by emergency department physician    Rhythm: normal sinus with sinus arrhythmia  Rate: normal  Axis: normal  Ectopy: none  Conduction: normal  ST Segments: no acute change  T Waves: no acute change  Q Waves: none    Clinical Impression: no acute changes and normal EKG    Rogerio Mullen MD    All EKG's are interpreted by the Emergency Department Physician who either signs or co-signs this chart in the absence of a cardiologist.    EMERGENCY DEPARTMENT COURSE:  ED Course as of Mar 30 0551   Tue Mar 30, 2021   0208 Patient states he feels as though he may be withdrawing from alcohol at this time because his abdomen hurts and he appears to have a tremor. Will start the patient on CIWA protocol. Patient is agreeable to stay for cardiology consultation as well.     [TS]   3450 For the observation resident this is a 53-year-old male with history significant daily alcohol abuse who presents to the emergency department for shortness of breath and headache, concern for Covid. Covid negative and labs and imaging unremarkable. Patient began to withdrawal in the emergency department as manifested by mild tremor and abdominal pain, treated with Ativan and started on CIWA protocol. Admitted to the observation unit for cardiology consultation due to heart score 4 and for initial alcohol detoxification. Patient indicated in the ER that he does wish to go to rehab. [TS]      ED Course User Index  [TS] Hailey Pisano MD       PROCEDURES:  None    CONSULTS:  IP CONSULT TO SOCIAL WORK  IP CONSULT TO CARDIOLOGY    CRITICAL CARE:  Please see attending note. FINAL IMPRESSION      1. Alcohol withdrawal syndrome without complication ([address]    2. Shortness of breath        DISPOSITION / PLAN     DISPOSITION Admitted 03/30/2021 02:17:01 AM      PATIENT REFERRED TO:  No follow-up provider specified. DISCHARGE MEDICATIONS:  Current Discharge Medication List          Hailey Pisano MD  Emergency Medicine Resident    This patient was evaluated in the Emergency Department for symptoms described in the history of present illness. He/she was evaluated in the context of the global COVID-19 pandemic, which necessitated consideration that the patient might be at risk for infection with the SARS-CoV-2 virus that causes COVID-19. Institutional protocols and algorithms that pertain to the evaluation of patients at risk for COVID-19 are in a state of rapid change based on information released by regulatory bodies including the CDC and federal and state organizations. These policies and algorithms were followed during the patient's care in the ED.     (Please note that portions of thisnote were completed with a voice recognition program.  Efforts were made to edit the dictations but occasionally words are mis-transcribed.)        Hailey Pisano MD  Resident  03/30/21 3972

## 2021-03-30 NOTE — CARE COORDINATION
Consult received this date for consideration of rehab  Chart reviewed  Met with pt this date was alert and oriented  Pt states he started drinking alcohol at age 15. Parents drank a lot that time. Pt states for the past few days he and his 2 friends consumed approx 1-2 gallons of 42 proof vodka. Pt admits drinking at least 5 steel reserves (tall cans) prior admission. Pt reports he has been to rehab in the past. Recently  at [place] [address] six months ago. Completed assessment at both [facility] and [facility] in the past and was declined due to his h/o seizures. Pt unsure of rehab at this time. Plans to cut back on his alcohol consumption. Provided a list of  alcohol treatment programs if decides to seek treatment after d/c.   Insurance is LX[employer]

## 2021-03-30 NOTE — PROGRESS NOTES
[facility]EL ROMERO, [facility]. Shortness of breath [R06.02] . Vitals:    03/30/21 0730   BP: 124/82   Pulse: 68   Resp: 18   Temp: 98.1 °F (36.7 °C)   SpO2: 95%   . Patients home meds are   Prior to Admission medications    Medication Sig Start Date End Date Taking? Authorizing Provider   levETIRAcetam (KEPPRA) 500 MG tablet Take 500 mg by mouth 2 times daily   Yes Historical Provider, MD   QUEtiapine (SEROQUEL XR) 300 MG extended release tablet Take 300 mg by mouth nightly   Yes Historical Provider, MD   naproxen (NAPROSYN) 500 MG tablet Take 500 mg by mouth 2 times daily (with meals)   Yes Historical Provider, MD   magnesium oxide (MAG-OX) 400 MG tablet Take 400 mg by mouth daily   Yes Historical Provider, MD   lamoTRIgine (LAMICTAL) 100 MG tablet Starting from 1/13/2021, take 1 tab BID 9/8/20  Yes THERESA Urrutia CNP   busPIRone (BUSPAR) 15 MG tablet Take 15 mg by mouth 3 times daily   Yes Historical Provider, MD   hydrOXYzine (VISTARIL) 50 MG capsule Take 50 mg by mouth 2 times daily    Yes Historical Provider, MD   propranolol (INDERAL) 80 MG tablet Take 80 mg by mouth daily    Yes Historical Provider, MD   sertraline (ZOLOFT) 50 MG tablet Take 50 mg by mouth daily   Yes Historical Provider, MD   tamsulosin (FLOMAX) 0.4 MG capsule Take 1 capsule by mouth daily for 10 days 11/27/20 12/7/20  Aliza Torre DO   fluticasone (FLONASE) 50 MCG/ACT nasal spray by Each Nostril route daily    Historical Provider, MD   albuterol sulfate HFA (VENTOLIN HFA) 108 (90 Base) MCG/ACT inhaler Inhale 2 puffs into the lungs every 6 hours as needed for Wheezing    Historical Provider, MD   divalproex (DEPAKOTE) 250 MG DR tablet Take 1 tab twice a day for 3 days, then take 1 tab for 3 days, then stop 9/8/20   THERESA Urrutia CNP   lamoTRIgine (LAMICTAL) 25 MG tablet 25 mg QD x14 days. 25 mg BID x14 days. 25 + 50 mg x14 days. 50 mg BID x14 days. 50 + 75 mg x14 days. 75 mg BID x14 days.  75 + 100 mg [id number] 9/9/20   Sixto Andrea, APRN - CNP   amitriptyline (ELAVIL) 50 MG tablet Take 50 mg by mouth nightly    Historical Provider, MD   .  Recent Surgical History: None = 0     Assessment No shortness of breath noted at this time. Agree with prn order per physician. RR 17  Breath Sounds: diminished. No deep breath given.       · Bronchodilator assessment at level  1  · Hyperinflation assessment at level   · Secretion Management assessment at level    ·   · []    Bronchodilator Assessment  BRONCHODILATOR ASSESSMENT SCORE  Score 0 1 2 3 4 5   Breath Sounds   [x]  Patient Baseline [x]  No Wheeze good aeration []  Faint, scattered wheezing, good aeration []  Expiratory Wheezing and or moderately diminished []  Insp/Exp wheeze and/or very diminished []  Insp/Exp and/ or marked distress   Respiratory Rate   [x]  Patient Baseline [x]  Less than 20 []  Less than 20 []  20-25 []  Greater than 25 []  Greater than 25   Peak flow % of Pred or PB []  NA   []  Greater than 90%  []  81-90% []  71-80% []  Less than or equal to 70%  or unable to perform []  Unable due to Respiratory Distress   Dyspnea re [x]  Patient Baseline [x]  No SOB []  No SOB []  SOB on exertion []  SOB min activity []  At rest/acute   e FEV% Predicted       []  NA []  Above 69%  []  Unable []  Above 60-69%  []  Unable []  Above 50-59%  []  Unable []  Above 35-49%  []  Unable []  Less than 35%  []  Unable                 []  Hyperinflation Assessment  Score 1 2 3   CXR and Breath Sounds   []  Clear []  No atelectasis  Basilar aeration []  Atelectasis or absent basilar breath sounds   Incentive Spirometry Volume  (Per IBW)   []  Greater than or equal to 15ml/Kg []  less than 15ml/Kg []  less than 15ml/Kg   Surgery within last 2 weeks []  None or general   []  Abdominal or thoracic surgery  []  Abdominal or thoracic   Chronic Pulmonary Historyre []  No []  Yes []  Yes     []  Secretion Management Assessment  Score 1 2 3   Bilateral Breath Sounds []  Occasional Rhonchi []  Scattered Rhonchi []  Course Rhonchi and/or poor aeration   Sputum    []  Small amount of thin secretions []  Moderate amount of viscous secretions []  Copius, Viscious Yellow/ Secretions   CXR as reported by physician []  clear  []  Unavailable []  Infiltrates and/or consolidation  []  Unavailable []  Mucus Plugging and or lobar consolidation  []  Unavailable   Cough []  Strong, productive cough []  Weak productive cough []  No cough or weak non-productive cough   DANIEL TAI NICK  9:03 AM                            FEMALE                                  MALE                            FEV1 Predicted Normal Values                        FEV1 Predicted Normal Values          Age                                     Height in Feet and Inches       Age                                     Height in Feet and Inches       4' 11\" 5' 1\" 5' 3\" 5' 5\" 5' 7\" 5' 9\" 5' 11\" 6' 1\"  4' 11\" 5' 1\" 5' 3\" 5' 5\" 5' 7\" 5' 9\" 5' 11\" 6' 1\"   42 - 45 2.49 2.66 2.84 3.03 3.22 3.42 3.62 3.83 42 - 45 2.82 3.03 3.26 3.49 3.72 3.96 4.22 4.47   46 - 49 2.40 2.57 2.76 2.94 3.14 3.33 3.54 3.75 46 - 49 2.70 2.92 3.14 3.37 3.61 3.85 4.10 4.36   50 - 53 2.31 2.48 2.66 2.85 3.04 3.24 3.45 3.66 50 - 53 2.58 2.80 3.02 3.25 3.49 3.73 3.98 4.24   54 - 57 2.21 2.38 2.57 2.75 2.95 3.14 3.35 3.56 54 - 57 2.46 2.67 2.89 3.12 3.36 3.60 3.85 4.11   58 - 61 2.10 2.28 2.46 2.65 2.84 3.04 3.24 3.45 58 - 61 2.32 2.54 2.76 2.99 3.23 3.47 3.72 3.98   62 - 65 1.99 2.17 2.35 2.54 2.73 2.93 3.13 3.34 62 - 65 2.19 2.40 2.62 2.85 3.09 3.33 3.58 3.84   66 - 69 1.88 2.05 2.23 2.42 2.61 2.81 3.02 3.23 66 - 69 2.04 2.26 2.48 2.71 2.95 3.19 3.44 3.70   70+ 1.82 1.99 2.17 2.36 2.55 2.75 2.95 3.16 70+ 1.97 2.19 2.41 2.64 2.87 3.12 3.37 3.62             Predicted Peak Expiratory Flow Rate                                       Height (in)  Female       Height (in) Male           Age 64 62 64 63 57 71 78 74 Age            21 344 357 372 387 402 417 200 522 60 62 64 66 68 70 72 74 76   25 337 352 366 381 396 411 426 441 25 447 476 505 533 562 [phone]   30 329 344 359 374 389 404 419 434 30 437 466 494 523 552 580 609 638 667   35 322 337 351 366 381 396 411 426 35 426 455 484 [phone] 657   40 314 329 344 359 374 389 404 419 40 416 445 473 502 531 559 588 617 647   45 307 322 336 351 366 381 396 411 45 405 434 463 491 520 549 577 606 636   50 299 314 329 344 359 374 389 404 50 395 424 452 481 510 538 567 596 625   55 292 307 321 336 351 366 381 396 55 384 413 442 470 499 528 556 585 615   60 284 299 314 329 344 359 374 389 60 374 403 431 460 489 517 546 575 605   65 277 292 306 321 336 351 366 381 65 363 392 421 449 478 507 535 564 594   70 269 284 299 314 329 344 359 374 70 353 382 410 439 468 496 525 554 583   75 261 274 289 305 319 334 348 364 75 344 372 400 429 458 487 515 544 573   80 253 266 282 296 312 327 342 356 80 335 362 390 419 448 476 [phone]

## 2021-03-30 NOTE — CARE COORDINATION
Case Management Initial Discharge Plan  Larry Basilio,             Met with:patient to discuss discharge plans. Information verified: address, contacts, phone number, , insurance Yes    Emergency Contact/Next of Kin name & number: Dang Chou (firend) [phone]    PCP: No primary care provider on file. Date of last visit: No PCP    Insurance Provider: [facility]    Discharge Planning    Living Arrangements:  Other (Comment)   Support Systems:  Friends/Neighbors    Home: not ure where he is going when discharged. Patient able to perform ADL's:Independent    Current Services (outpatient & in home) none  DME equipment:   DME provider:     Receiving oral anticoagulation therapy? No    If indicated:   Physician managing anticoagulation treatment:   Where does patient obtain lab work for ATC treatment? Potential Assistance Needed:  N/A    Patient agreeable to home care: No  [place] of choice provided:  n/a    Prior SNF/Rehab Placement and Facility:   Agreeable to SNF/Rehab:   [place] of choice provided:      Evaluation: yes, completed on 3-30-21    Expected Discharge date:  21    Patient expects to be discharged to:  trying to decide where to go.  states that he has a housing voucher  Follow Up Appointment: Best Day/ Time:      Transportation provider: yelena  Transportation arrangements needed for discharge: No    Readmission Risk              Risk of Unplanned Readmission:        0             Does patient have a readmission risk score greater than 14?: not calculated. If yes, follow-up appointment must be made within 7 days of discharge. Goals of Care:       Discharge Plan: patient will decide where to go once discharged. States he has a housing voucher.               Electronically signed by Yandy Ding RN on 3/30/21 at 2:07 PM EDT

## 2021-03-30 NOTE — ED TRIAGE NOTES
Pt in contact with a cpvid pt and had a negative covid results 5 days ago. Pt states that he has incresing headache. Pt has shared cigarettes and bottles together. Pt has been drinking today and states he has consumed a lot of alcohol in the past several days. Pt states that he has not taken medications in 3 days. A&o x4 in NAD all vitlas stable.

## 2021-03-30 NOTE — ED PROVIDER NOTES
Stable chest radiograph, without evidence of acute cardiopulmonary disease       OUTSTANDING TASKS / ADDITIONAL COMMENTS   1. Labs   2.  Regi Parks MD  Emergency Medicine Attending  [address] Tiffanie Maria MD  03/29/21 7607

## 2021-03-30 NOTE — ED PROVIDER NOTES
Department with complaint of fatigue, cough, sob. +Covid exposure. No fever. Chronic smoker, COPD. Physical:     height is 5' 10\" (1.778 m) and weight is 170 lb (77.1 kg). His temporal temperature is 97.5 °F (36.4 °C). His blood pressure is 141/92 (abnormal) and his pulse is 84. His respiration is 16 and oxygen saturation is 98%.    50 y.o. male No acute distress, alert, answering questions appropriately, cardiac exam regular rate and rhythm no murmurs rubs gallops, pulmonary scattered wheezes throughout, abdomen is soft nontender nondistended, radial pulse 2+ bilaterally. Impression: SOB, COPD? Possible COVID?     Plan: COVID Swab, steroids, aerosols, thiamine folate (alcoholic)    EKG Interpretation  EKG Interpretation    Interpreted by emergency department physician    Rhythm: sinus arrhythmia  Rate: normal  Axis: normal  Ectopy: none  Conduction: normal  ST Segments: no acute change  T Waves: no acute change  Q Waves: none    Clinical Impression: sinus arrhythmia    Kami Jones    Interpreted by me      Zack Soler MD, Cory Alfaro  Attending Emergency Physician         Kami Jones MD  03/30/21 [id number]

## 2021-03-31 PROBLEM — Z86.59 HX OF MAJOR DEPRESSION: Status: ACTIVE | Noted: 2021-03-31

## 2021-03-31 PROCEDURE — 6370000000 HC RX 637 (ALT 250 FOR IP): Performed by: EMERGENCY MEDICINE

## 2021-03-31 PROCEDURE — 6370000000 HC RX 637 (ALT 250 FOR IP): Performed by: STUDENT IN AN ORGANIZED HEALTH CARE EDUCATION/TRAINING PROGRAM

## 2021-03-31 PROCEDURE — 1200000000 HC SEMI PRIVATE

## 2021-03-31 PROCEDURE — 2580000003 HC RX 258: Performed by: EMERGENCY MEDICINE

## 2021-03-31 PROCEDURE — 90792 PSYCH DIAG EVAL W/MED SRVCS: CPT | Performed by: PSYCHIATRY & NEUROLOGY

## 2021-03-31 PROCEDURE — 6370000000 HC RX 637 (ALT 250 FOR IP): Performed by: NURSE PRACTITIONER

## 2021-03-31 RX ORDER — PREDNISONE 20 MG/1
20 TABLET ORAL DAILY
Qty: 6 TABLET | Refills: 0 | Status: SHIPPED | OUTPATIENT
Start: 2021-04-01 | End: 2021-04-02 | Stop reason: HOSPADM

## 2021-03-31 RX ORDER — QUETIAPINE FUMARATE 25 MG/1
25 TABLET, FILM COATED ORAL DAILY
Status: ON HOLD | COMMUNITY
End: 2021-03-31 | Stop reason: HOSPADM

## 2021-03-31 RX ADMIN — PREDNISONE 20 MG: 20 TABLET ORAL at 07:49

## 2021-03-31 RX ADMIN — HYDROXYZINE HYDROCHLORIDE 50 MG: 50 TABLET ORAL at 19:48

## 2021-03-31 RX ADMIN — HYDROXYZINE HYDROCHLORIDE 50 MG: 50 TABLET ORAL at 07:49

## 2021-03-31 RX ADMIN — GABAPENTIN 800 MG: 400 CAPSULE ORAL at 07:48

## 2021-03-31 RX ADMIN — SODIUM CHLORIDE, PRESERVATIVE FREE 10 ML: 5 INJECTION INTRAVENOUS at 07:48

## 2021-03-31 RX ADMIN — LORAZEPAM 3 MG: 2 TABLET ORAL at 10:30

## 2021-03-31 RX ADMIN — ACETAMINOPHEN 650 MG: 325 TABLET ORAL at 07:49

## 2021-03-31 RX ADMIN — LORAZEPAM 3 MG: 2 TABLET ORAL at 14:54

## 2021-03-31 RX ADMIN — BUSPIRONE HYDROCHLORIDE 15 MG: 15 TABLET ORAL at 19:48

## 2021-03-31 RX ADMIN — LORAZEPAM 3 MG: 2 TABLET ORAL at 07:49

## 2021-03-31 RX ADMIN — AMITRIPTYLINE HYDROCHLORIDE 50 MG: 50 TABLET, FILM COATED ORAL at 19:48

## 2021-03-31 RX ADMIN — BUSPIRONE HYDROCHLORIDE 15 MG: 15 TABLET ORAL at 15:04

## 2021-03-31 RX ADMIN — LORAZEPAM 3 MG: 2 TABLET ORAL at 15:53

## 2021-03-31 RX ADMIN — LAMOTRIGINE 100 MG: 100 TABLET ORAL at 07:49

## 2021-03-31 RX ADMIN — GABAPENTIN 800 MG: 400 CAPSULE ORAL at 14:59

## 2021-03-31 RX ADMIN — LORAZEPAM 2 MG: 2 TABLET ORAL at 11:40

## 2021-03-31 RX ADMIN — BUSPIRONE HYDROCHLORIDE 15 MG: 15 TABLET ORAL at 07:49

## 2021-03-31 RX ADMIN — LORAZEPAM 2 MG: 2 TABLET ORAL at 12:53

## 2021-03-31 RX ADMIN — PROPRANOLOL HYDROCHLORIDE 80 MG: 80 CAPSULE, EXTENDED RELEASE ORAL at 07:51

## 2021-03-31 RX ADMIN — SERTRALINE 50 MG: 50 TABLET, FILM COATED ORAL at 07:49

## 2021-03-31 RX ADMIN — SODIUM CHLORIDE, PRESERVATIVE FREE 10 ML: 5 INJECTION INTRAVENOUS at 19:50

## 2021-03-31 RX ADMIN — GABAPENTIN 800 MG: 400 CAPSULE ORAL at 19:48

## 2021-03-31 RX ADMIN — TAMSULOSIN HYDROCHLORIDE 0.4 MG: 0.4 CAPSULE ORAL at 07:50

## 2021-03-31 RX ADMIN — LORAZEPAM 3 MG: 2 TABLET ORAL at 13:45

## 2021-03-31 RX ADMIN — LAMOTRIGINE 100 MG: 100 TABLET ORAL at 19:48

## 2021-03-31 ASSESSMENT — PAIN DESCRIPTION - LOCATION: LOCATION: BACK

## 2021-03-31 ASSESSMENT — PAIN SCALES - GENERAL
PAINLEVEL_OUTOF10: 0
PAINLEVEL_OUTOF10: 8
PAINLEVEL_OUTOF10: 8
PAINLEVEL_OUTOF10: 4

## 2021-03-31 NOTE — CARE COORDINATION
Call placed to [facility], [phone], ambulance will arrived at 10Pm for transfer to [facility] room 233.

## 2021-03-31 NOTE — CARE COORDINATION
Informed by the attending that patient is medically cleared. Patient has signed the voluntary admission form, and the form has been faxed to [address] [place] [address]. Call to [facility], to regarding the above paperwork.

## 2021-03-31 NOTE — PROGRESS NOTES
OBS/CDU   RESIDENT NOTE      Patients PCP is: No primary care provider on file. SUBJECTIVE      Patient presented complaining of shortness of breath with wheezing as well as arthralgias, myalgias and vomiting after binge drinking. He was concerned that he had a Covid positive contact. ED work-up demonstrated negative Covid test, unremarkable blood counts, mild hypokalemia to 3.5 replenished p.o., negative urinalysis and stable troponin. BNP was within normal limits. EKG was normal.  Patient was admitted for breathing optimization and continued aerosols. Remains on [facility] protocol for hx of alcohol withdrawal. Interested in psychiatric evaluation, and alcohol rehab. Medically stable to transfer to facility on voluntary basis for treatment. No acute events overnight. Has been able to tolerate a full diet without nausea or vomiting. The patient is urinating on his own and is passing flatus. Denies fever, chills, nausea, vomiting, chest pain, shortness of breath, abdominal pain, focal weakness, numbness, tingling, urinary/bowel symptoms, vision changes, visual hallucinations, or headache. PHYSICAL EXAM      General: NAD, AO X 3  Heent: EMOI, PERRL  Neck: SUPPLE, NO JVD  Cardiovascular: RRR, S1S2  Pulmonary: diffuse expiratory wheezes but improved from yesterday  Abdomen: SOFT, NTTP, ND, +BS  Extremities: +2/4 PULSES DISTAL, NO SWELLING  Neuro / Psych: NO NUMBNESS OR TINGLING, MENTATION AT BASELINE    PERTINENT TEST /EXAMS      I have reviewed all available laboratory results.     MEDICATIONS CURRENT   sodium chloride flush 0.9 % injection 10 mL, 2 times per day  sodium chloride flush 0.9 % injection 10 mL, PRN  0.9 % sodium chloride infusion, PRN  dicyclomine (BENTYL) injection 20 mg, Once  LORazepam (ATIVAN) injection 1 mg, Once  amitriptyline (ELAVIL) tablet 50 mg, Nightly  busPIRone (BUSPAR) tablet 15 mg, TID  hydrOXYzine (ATARAX) tablet 50 mg, BID  propranolol (INDERAL LA) extended release capsule 80 mg, Daily  sertraline (ZOLOFT) tablet 50 mg, Daily  albuterol (PROVENTIL) nebulizer solution 2.5 mg, Q4H PRN  tamsulosin (FLOMAX) capsule 0.4 mg, Daily  lamoTRIgine (LAMICTAL) tablet 100 mg, BID  sodium chloride flush 0.9 % injection 10 mL, 2 times per day  sodium chloride flush 0.9 % injection 10 mL, PRN  0.9 % sodium chloride infusion, PRN  enoxaparin (LOVENOX) injection 40 mg, Daily  acetaminophen (TYLENOL) tablet 650 mg, Q4H PRN  gabapentin (NEURONTIN) capsule 800 mg, TID  predniSONE (DELTASONE) tablet 20 mg, Daily    Followed by  Daniel Lino ON 4/2/2021] predniSONE (DELTASONE) tablet 15 mg, Daily    Followed by  Daniel Lino ON 4/5/2021] predniSONE (DELTASONE) tablet 10 mg, Daily    Followed by  Daniel Lino ON 4/8/2021] predniSONE (DELTASONE) tablet 5 mg, Daily  LORazepam (ATIVAN) tablet 1 mg, Q1H PRN    Or  LORazepam (ATIVAN) injection 1 mg, Q1H PRN    Or  LORazepam (ATIVAN) tablet 2 mg, Q1H PRN    Or  LORazepam (ATIVAN) injection 2 mg, Q1H PRN    Or  LORazepam (ATIVAN) tablet 3 mg, Q1H PRN    Or  LORazepam (ATIVAN) injection 3 mg, Q1H PRN    Or  LORazepam (ATIVAN) tablet 4 mg, Q1H PRN    Or  LORazepam (ATIVAN) injection 4 mg, Q1H PRN  sodium chloride flush 0.9 % injection 10 mL, PRN  albuterol sulfate  (90 Base) MCG/ACT inhaler 2 puff, Q4H PRN  ipratropium (ATROVENT HFA) 17 MCG/ACT inhaler 2 puff, Q4H PRN        All medication charted and reviewed. CONSULTS      IP CONSULT TO SOCIAL WORK    ASSESSMENT/PLAN       Angela Marmolejo is a 50 y.o. male who presents with multiple complaints. ED work-up negative. Admitted for symptomatic management.     · SOB  ? Aerosols PRN  ? BNP WNL  ? Stable troponin, normal EKG   · Alcohol use  ? CIWA protocol   ? Potential psychiatric placement  ? Patient is medically stable to transfer to facility for voluntary treatment.   · Continue home medications and pain control  · Monitor vitals, labs, and imaging  · DISPO: pending consults and clinical improvement    --  Karolina STANLEY [facility]  Emergency Medicine Resident Physician     This dictation was generated by voice recognition computer software. Although all attempts are made to edit the dictation for accuracy, there may be errors in the transcription that are not intended.
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
## 2021-03-31 NOTE — CARE COORDINATION
Met with pt after receiving a social work consult for homeless and needing outpatient psych services. Pt is alert and oriented. He states that he has been staying at [facility] that ended yesterday. Discussed other shelter's and pt states that he cannot return to [place] [address] until he makes and appt and meets with their treatment team.  SW encouraged pt to call [employer] to set up this meeting. Pt stated that he wanted to work on getting his own apartment as he has a voucher. Pt wanted  to make phone calls regarding an apartment and this voucher. Encouraged pt to make his own phone calls as he has the contact info. Pt became somewhat agitated and stated that social workers should know everything. Pt then began talking about recovery housing. He stated that he wanted to go there until he could get his apartment. He stated that he has tried [place] [facility] in the past and they denied him. Pt stated that he didn't know where he wanted to go as he really wasn't ready for  to come in as he just got out of the shower. Pt still insisting that social work make phone calls regarding his housing voucher. Again, encouraged pt to make these phone calls first.   Pt stated that he wanted to talk with a  that had more knowledge.

## 2021-03-31 NOTE — CARE COORDINATION
Call from [facility], she states that patient is not medically cleared for transfer. Call placed to [facility], transportation cancelled for tonight.
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
## 2021-03-31 NOTE — PROGRESS NOTES
[employer]  CDU / OBSERVATION ENCOUNTER  ATTENDING NOTE       I performed a history and physical examination of the patient and discussed management with the resident or midlevel provider. I reviewed the resident or midlevel provider's note and agree with the documented findings and plan of care. Any areas of disagreement are noted on the chart. I was personally present for the key portions of any procedures. I have documented in the chart those procedures where I was not present during the key portions. I have reviewed the nurses notes. I agree with the chief complaint, past medical history, past surgical history, allergies, medications, social and family history as documented unless otherwise noted below. The Family history, social history, and ROS are effectively unchanged since admission unless noted elsewhere in the chart. Patient with complaints of some withdrawal symptoms. Patient is feeling slightly anxious and has been receiving Ativan. Patient also complains of depression and homelessness. Patient is wanting not to drink and wanting to do some rehabilitation. When I evaluate the patient he is still speaking in a calm voice and in full sentences with good attention but he is getting the nurses some difficulty in terms of feeling excitable and agitated. Patient has asked for psychiatric evaluation. Patient is not actively suicidal at this time. Patient is awaiting psychiatric evaluation and will be reassessed afterwards for disposition. Disposition will be somewhat dependent on social situation.     Homar Kent MD  Attending Emergency  Physician

## 2021-04-01 PROCEDURE — 2580000003 HC RX 258: Performed by: EMERGENCY MEDICINE

## 2021-04-01 PROCEDURE — 6370000000 HC RX 637 (ALT 250 FOR IP): Performed by: EMERGENCY MEDICINE

## 2021-04-01 PROCEDURE — 6370000000 HC RX 637 (ALT 250 FOR IP): Performed by: STUDENT IN AN ORGANIZED HEALTH CARE EDUCATION/TRAINING PROGRAM

## 2021-04-01 PROCEDURE — 1200000000 HC SEMI PRIVATE

## 2021-04-01 RX ORDER — LORAZEPAM 1 MG/1
1 TABLET ORAL
Status: DISCONTINUED | OUTPATIENT
Start: 2021-04-01 | End: 2021-04-01

## 2021-04-01 RX ORDER — QUETIAPINE FUMARATE 300 MG/1
300 TABLET, FILM COATED ORAL NIGHTLY
Status: DISCONTINUED | OUTPATIENT
Start: 2021-04-01 | End: 2021-04-02 | Stop reason: HOSPADM

## 2021-04-01 RX ORDER — CLONIDINE 0.2 MG/24H
1 PATCH, EXTENDED RELEASE TRANSDERMAL WEEKLY
Status: DISCONTINUED | OUTPATIENT
Start: 2021-04-01 | End: 2021-04-02 | Stop reason: HOSPADM

## 2021-04-01 RX ORDER — LORAZEPAM 1 MG/1
1 TABLET ORAL EVERY 4 HOURS PRN
Status: DISCONTINUED | OUTPATIENT
Start: 2021-04-01 | End: 2021-04-02

## 2021-04-01 RX ORDER — POLYETHYLENE GLYCOL 3350 17 G
2 POWDER IN PACKET (EA) ORAL PRN
Status: DISCONTINUED | OUTPATIENT
Start: 2021-04-01 | End: 2021-04-02 | Stop reason: HOSPADM

## 2021-04-01 RX ADMIN — TAMSULOSIN HYDROCHLORIDE 0.4 MG: 0.4 CAPSULE ORAL at 11:24

## 2021-04-01 RX ADMIN — GABAPENTIN 800 MG: 400 CAPSULE ORAL at 13:15

## 2021-04-01 RX ADMIN — GABAPENTIN 800 MG: 400 CAPSULE ORAL at 08:45

## 2021-04-01 RX ADMIN — LORAZEPAM 1 MG: 1 TABLET ORAL at 15:31

## 2021-04-01 RX ADMIN — HYDROXYZINE HYDROCHLORIDE 50 MG: 50 TABLET ORAL at 08:47

## 2021-04-01 RX ADMIN — GABAPENTIN 800 MG: 400 CAPSULE ORAL at 20:39

## 2021-04-01 RX ADMIN — AMITRIPTYLINE HYDROCHLORIDE 50 MG: 50 TABLET, FILM COATED ORAL at 20:39

## 2021-04-01 RX ADMIN — LORAZEPAM 1 MG: 1 TABLET ORAL at 19:03

## 2021-04-01 RX ADMIN — HYDROXYZINE HYDROCHLORIDE 50 MG: 50 TABLET ORAL at 20:39

## 2021-04-01 RX ADMIN — LORAZEPAM 1 MG: 1 TABLET ORAL at 13:15

## 2021-04-01 RX ADMIN — LAMOTRIGINE 100 MG: 100 TABLET ORAL at 08:46

## 2021-04-01 RX ADMIN — SODIUM CHLORIDE, PRESERVATIVE FREE 10 ML: 5 INJECTION INTRAVENOUS at 20:39

## 2021-04-01 RX ADMIN — BUSPIRONE HYDROCHLORIDE 15 MG: 15 TABLET ORAL at 08:45

## 2021-04-01 RX ADMIN — LORAZEPAM 1 MG: 1 TABLET ORAL at 10:10

## 2021-04-01 RX ADMIN — PROPRANOLOL HYDROCHLORIDE 80 MG: 80 CAPSULE, EXTENDED RELEASE ORAL at 08:45

## 2021-04-01 RX ADMIN — SODIUM CHLORIDE, PRESERVATIVE FREE 10 ML: 5 INJECTION INTRAVENOUS at 08:51

## 2021-04-01 RX ADMIN — SERTRALINE 50 MG: 50 TABLET, FILM COATED ORAL at 08:45

## 2021-04-01 RX ADMIN — PREDNISONE 20 MG: 20 TABLET ORAL at 08:46

## 2021-04-01 RX ADMIN — LAMOTRIGINE 100 MG: 100 TABLET ORAL at 20:39

## 2021-04-01 RX ADMIN — BUSPIRONE HYDROCHLORIDE 15 MG: 15 TABLET ORAL at 20:39

## 2021-04-01 RX ADMIN — BUSPIRONE HYDROCHLORIDE 15 MG: 15 TABLET ORAL at 13:15

## 2021-04-01 NOTE — PROGRESS NOTES
OBS/CDU   RESIDENT NOTE      Patients PCP is: No primary care provider on file. SUBJECTIVE      Patient with plan to go to [facility], however given the amount of Ativan he required yesterday he was not considered medically cleared. Patient reports feeling significantly improved today but not back to baseline. He still has some anxiety and agitation, as well as 2 episodes of vomiting earlier this morning. He has only required 2 mg of Ativan since 12 AM this morning. Patient was also discontinued on beer he was being given during his meals. Patient denies any other symptoms. PHYSICAL EXAM      General: NAD, AO X 3, patient walking around in room, speaking in full sentences, appears slightly anxious and agitated, diaphoretic  Heent: Normocephalic/atraumatic, pupils equal round reactive to light  Neck: SUPPLE, NO JVD  Cardiovascular: RRR, S1S2, no murmurs, gallops, rubs  Pulmonary: CTAB, NO SOB  Abdomen: SOFT, NTTP, ND, +BS  Extremities: +2/4 PULSES DISTAL, NO SWELLING  Neuro / Psych: Alert and oriented x3, answers questions appropriately, speech clear, no facial droop  Moves all extremities spontaneously, Sensation intact to light touch extremities x4, has mild tremors in bilateral hands, no tongue fasciculations       PERTINENT TEST /EXAMS      I have reviewed all available laboratory results.     MEDICATIONS CURRENT   LORazepam (ATIVAN) tablet 1 mg, Q2H PRN  cloNIDine (CATAPRES) 0.2 MG/24HR 1 patch, Weekly  nicotine polacrilex (COMMIT) lozenge 2 mg, PRN  sodium chloride flush 0.9 % injection 10 mL, 2 times per day  sodium chloride flush 0.9 % injection 10 mL, PRN  0.9 % sodium chloride infusion, PRN  dicyclomine (BENTYL) injection 20 mg, Once  LORazepam (ATIVAN) injection 1 mg, Once  amitriptyline (ELAVIL) tablet 50 mg, Nightly  busPIRone (BUSPAR) tablet 15 mg, TID  hydrOXYzine (ATARAX) tablet 50 mg, BID  propranolol (INDERAL LA) extended release capsule 80 mg, Daily  sertraline (ZOLOFT) tablet 50 mg, housing but there are no places for detox at this time. There is a place for him at a shelter but patient is requiring Ativan which he will not be discharged home on. Can plan for discharge to shelter tomorrow if fully detoxed    · Depression  ? Patient with worsening depression due to several stressors including his alcohol use  ? Denies current SI but has waxing and waning suicidal thoughts  ? Will contact [facility] again for possible placement when patient medically cleared    · Continue home medications and pain control  · Monitor vitals, labs, and imaging  · DISPO: pending consults and clinical improvement       --  Rashida Pedro  Emergency Medicine Resident Physician     This dictation was generated by voice recognition computer software. Although all attempts are made to edit the dictation for accuracy, there may be errors in the transcription that are not intended.

## 2021-04-01 NOTE — CARE COORDINATION
Met with pt again today to assist with discharge disposition. Pt states that he wants detox now. Provided pt the number for  [facility] detox as they would be the only option for detox due to pt's seizure history. Pt has to complete a phone assessment with   [facility] to be accepted. Pt called and completed assessment and was declined by [facility]. Discussed shelter options. Pt [facility] return to [facility] until he schedules an appointment with the treatment team.  Encouraged him to call for appt. [facility] is closed down and the only other men's shelter is [facility]. Called the [facility] and they have openings. Notified pt that he has to be there between 4-5pm and to have his ID. Also explained to pt that he cannot show up drunk or they will not let him in. Discussed VA services with pt and advised him to call the  [facility] clinic and schedule an appt to complete applications to get into treatment from the [facility] and to sign up for the homeless veterans program.    The only option for housing for this pt, at this point, is [facility].

## 2021-04-01 NOTE — CARE COORDINATION
Rika from [facility] is notified that patient is receiving beer with each meal to determine if this intervention will prevent patient from coming to [facility]. Arsh Soni will check with the doctor and let writer know. 1700 Rika from [facility] notified writer to reconsult [facility] through [address] if [facility] is still needed once patient is medically stable.

## 2021-04-01 NOTE — PROGRESS NOTES
[address]  CDU / OBSERVATION ENCOUNTER  ATTENDING NOTE       I performed a history and physical examination of the patient and discussed management with the resident or midlevel provider. I reviewed the resident or midlevel provider's note and agree with the documented findings and plan of care. Any areas of disagreement are noted on the chart. I was personally present for the key portions of any procedures. I have documented in the chart those procedures where I was not present during the key portions. I have reviewed the nurses notes. I agree with the chief complaint, past medical history, past surgical history, allergies, medications, social and family history as documented unless otherwise noted below. The Family history, social history, and ROS are effectively unchanged since admission unless noted elsewhere in the chart. Patient for further mental health care. Patient initially slated to go to [facility] but given the amount of Ativan he was using yesterday patient was not medically cleared. Patient is doing much better today and has required only 2 mg of Ativan since midnight last night. Patient has been discontinued on the beer which was being used to help wean him. Patient currently comfortable and awaiting placement. May consider rehab as opposed to [facility] treatment.   Patient is with normal vital signs and no longer acutely in withdrawal.  Patient is with normal his vital signs and is not agitated and is conversational    At this point I consider the patient medically clear for treatment for mental health issues and substance abuse    Danielle Harvey MD  Attending Emergency  Physician

## 2021-04-02 ENCOUNTER — HOSPITAL ENCOUNTER (INPATIENT)
Age: 56
LOS: 10 days | Discharge: OTHER INSTITUTION | DRG: 751 | End: 2021-04-12
Attending: PSYCHIATRY & NEUROLOGY | Admitting: PSYCHIATRY & NEUROLOGY
Payer: MEDICAID

## 2021-04-02 VITALS
HEART RATE: 72 BPM | DIASTOLIC BLOOD PRESSURE: 78 MMHG | RESPIRATION RATE: 18 BRPM | OXYGEN SATURATION: 98 % | BODY MASS INDEX: 24.34 KG/M2 | HEIGHT: 70 IN | TEMPERATURE: 97.5 F | WEIGHT: 170 LBS | SYSTOLIC BLOOD PRESSURE: 131 MMHG

## 2021-04-02 PROBLEM — F32.A DEPRESSION WITH SUICIDAL IDEATION: Status: ACTIVE | Noted: 2021-04-02

## 2021-04-02 PROBLEM — R45.851 DEPRESSION WITH SUICIDAL IDEATION: Status: ACTIVE | Noted: 2021-04-02

## 2021-04-02 PROCEDURE — 1240000000 HC EMOTIONAL WELLNESS R&B

## 2021-04-02 PROCEDURE — 6370000000 HC RX 637 (ALT 250 FOR IP): Performed by: PSYCHIATRY & NEUROLOGY

## 2021-04-02 PROCEDURE — 6370000000 HC RX 637 (ALT 250 FOR IP): Performed by: EMERGENCY MEDICINE

## 2021-04-02 PROCEDURE — 99232 SBSQ HOSP IP/OBS MODERATE 35: CPT | Performed by: PSYCHIATRY & NEUROLOGY

## 2021-04-02 PROCEDURE — 2580000003 HC RX 258: Performed by: EMERGENCY MEDICINE

## 2021-04-02 PROCEDURE — 6370000000 HC RX 637 (ALT 250 FOR IP): Performed by: STUDENT IN AN ORGANIZED HEALTH CARE EDUCATION/TRAINING PROGRAM

## 2021-04-02 RX ORDER — ACETAMINOPHEN 325 MG/1
650 TABLET ORAL EVERY 4 HOURS PRN
Status: DISCONTINUED | OUTPATIENT
Start: 2021-04-02 | End: 2021-04-12 | Stop reason: HOSPADM

## 2021-04-02 RX ORDER — LEVETIRACETAM 500 MG/1
500 TABLET ORAL 2 TIMES DAILY
Status: DISCONTINUED | OUTPATIENT
Start: 2021-04-02 | End: 2021-04-02 | Stop reason: HOSPADM

## 2021-04-02 RX ORDER — POLYETHYLENE GLYCOL 3350 17 G/17G
17 POWDER, FOR SOLUTION ORAL DAILY PRN
Status: DISCONTINUED | OUTPATIENT
Start: 2021-04-02 | End: 2021-04-12 | Stop reason: HOSPADM

## 2021-04-02 RX ORDER — PREDNISONE 10 MG/1
10 TABLET ORAL DAILY
Qty: 3 TABLET | Refills: 0 | Status: ON HOLD | OUTPATIENT
Start: 2021-04-05 | End: 2021-04-12 | Stop reason: HOSPADM

## 2021-04-02 RX ORDER — QUETIAPINE FUMARATE 300 MG/1
300 TABLET, FILM COATED ORAL ONCE
Status: COMPLETED | OUTPATIENT
Start: 2021-04-02 | End: 2021-04-02

## 2021-04-02 RX ORDER — LEVETIRACETAM 500 MG/1
500 TABLET ORAL 2 TIMES DAILY
Qty: 60 TABLET | Refills: 0 | Status: ON HOLD | OUTPATIENT
Start: 2021-04-02 | End: 2021-04-12 | Stop reason: SDUPTHER

## 2021-04-02 RX ORDER — NAPROXEN 250 MG/1
500 TABLET ORAL 2 TIMES DAILY WITH MEALS
Status: DISCONTINUED | OUTPATIENT
Start: 2021-04-02 | End: 2021-04-02 | Stop reason: HOSPADM

## 2021-04-02 RX ORDER — LAMOTRIGINE 100 MG/1
TABLET ORAL
Qty: 60 TABLET | Refills: 0 | Status: ON HOLD | OUTPATIENT
Start: 2021-04-02 | End: 2021-04-12 | Stop reason: SDUPTHER

## 2021-04-02 RX ORDER — IBUPROFEN 400 MG/1
400 TABLET ORAL EVERY 6 HOURS PRN
Status: DISCONTINUED | OUTPATIENT
Start: 2021-04-02 | End: 2021-04-05

## 2021-04-02 RX ORDER — PREDNISONE 1 MG/1
15 TABLET ORAL DAILY
Qty: 6 TABLET | Refills: 0 | Status: ON HOLD | OUTPATIENT
Start: 2021-04-03 | End: 2021-04-12 | Stop reason: HOSPADM

## 2021-04-02 RX ORDER — MAGNESIUM HYDROXIDE/ALUMINUM HYDROXICE/SIMETHICONE 120; 1200; 1200 MG/30ML; MG/30ML; MG/30ML
30 SUSPENSION ORAL EVERY 6 HOURS PRN
Status: DISCONTINUED | OUTPATIENT
Start: 2021-04-02 | End: 2021-04-12 | Stop reason: HOSPADM

## 2021-04-02 RX ORDER — HYDROXYZINE 50 MG/1
50 TABLET, FILM COATED ORAL 3 TIMES DAILY PRN
Status: DISCONTINUED | OUTPATIENT
Start: 2021-04-02 | End: 2021-04-12 | Stop reason: HOSPADM

## 2021-04-02 RX ORDER — CHLORDIAZEPOXIDE HYDROCHLORIDE 5 MG/1
5 CAPSULE, GELATIN COATED ORAL EVERY 6 HOURS PRN
Status: DISCONTINUED | OUTPATIENT
Start: 2021-04-02 | End: 2021-04-02 | Stop reason: HOSPADM

## 2021-04-02 RX ORDER — CHLORDIAZEPOXIDE HYDROCHLORIDE 5 MG/1
5 CAPSULE, GELATIN COATED ORAL 3 TIMES DAILY PRN
Status: DISCONTINUED | OUTPATIENT
Start: 2021-04-02 | End: 2021-04-03

## 2021-04-02 RX ORDER — LEVETIRACETAM 500 MG/1
500 TABLET ORAL 2 TIMES DAILY
Qty: 60 TABLET | Refills: 0 | Status: ON HOLD | OUTPATIENT
Start: 2021-04-02 | End: 2021-04-12 | Stop reason: HOSPADM

## 2021-04-02 RX ORDER — CHLORDIAZEPOXIDE HYDROCHLORIDE 10 MG/1
10 CAPSULE, GELATIN COATED ORAL 3 TIMES DAILY PRN
Status: DISCONTINUED | OUTPATIENT
Start: 2021-04-02 | End: 2021-04-02

## 2021-04-02 RX ORDER — TRAZODONE HYDROCHLORIDE 50 MG/1
50 TABLET ORAL NIGHTLY PRN
Status: DISCONTINUED | OUTPATIENT
Start: 2021-04-02 | End: 2021-04-03

## 2021-04-02 RX ORDER — PREDNISONE 1 MG/1
5 TABLET ORAL DAILY
Qty: 3 TABLET | Refills: 0 | Status: ON HOLD | OUTPATIENT
Start: 2021-04-08 | End: 2021-04-12 | Stop reason: HOSPADM

## 2021-04-02 RX ORDER — LORAZEPAM 0.5 MG/1
0.5 TABLET ORAL EVERY 8 HOURS PRN
Qty: 8 TABLET | Refills: 0 | Status: ON HOLD | OUTPATIENT
Start: 2021-04-02 | End: 2021-04-12 | Stop reason: HOSPADM

## 2021-04-02 RX ADMIN — LEVETIRACETAM 500 MG: 500 TABLET, FILM COATED ORAL at 12:43

## 2021-04-02 RX ADMIN — CHLORDIAZEPOXIDE HYDROCHLORIDE 5 MG: 5 CAPSULE ORAL at 22:24

## 2021-04-02 RX ADMIN — CHLORDIAZEPOXIDE HYDROCHLORIDE 5 MG: 5 CAPSULE ORAL at 12:45

## 2021-04-02 RX ADMIN — LEVETIRACETAM 500 MG: 500 TABLET, FILM COATED ORAL at 19:59

## 2021-04-02 RX ADMIN — NICOTINE POLACRILEX 2 MG: 2 GUM, CHEWING BUCCAL at 22:24

## 2021-04-02 RX ADMIN — NAPROXEN 500 MG: 250 TABLET ORAL at 19:10

## 2021-04-02 RX ADMIN — CHLORDIAZEPOXIDE HYDROCHLORIDE 5 MG: 5 CAPSULE ORAL at 19:10

## 2021-04-02 RX ADMIN — PREDNISONE 15 MG: 5 TABLET ORAL at 08:37

## 2021-04-02 RX ADMIN — HYDROXYZINE HYDROCHLORIDE 50 MG: 50 TABLET, FILM COATED ORAL at 22:24

## 2021-04-02 RX ADMIN — LAMOTRIGINE 100 MG: 100 TABLET ORAL at 08:37

## 2021-04-02 RX ADMIN — LAMOTRIGINE 100 MG: 100 TABLET ORAL at 19:59

## 2021-04-02 RX ADMIN — SERTRALINE 50 MG: 50 TABLET, FILM COATED ORAL at 08:36

## 2021-04-02 RX ADMIN — BUSPIRONE HYDROCHLORIDE 15 MG: 15 TABLET ORAL at 16:23

## 2021-04-02 RX ADMIN — TAMSULOSIN HYDROCHLORIDE 0.4 MG: 0.4 CAPSULE ORAL at 08:39

## 2021-04-02 RX ADMIN — GABAPENTIN 800 MG: 400 CAPSULE ORAL at 08:37

## 2021-04-02 RX ADMIN — TRAZODONE HYDROCHLORIDE 50 MG: 50 TABLET ORAL at 22:24

## 2021-04-02 RX ADMIN — MAGNESIUM GLUCONATE 500 MG ORAL TABLET 400 MG: 500 TABLET ORAL at 12:42

## 2021-04-02 RX ADMIN — GABAPENTIN 800 MG: 400 CAPSULE ORAL at 19:59

## 2021-04-02 RX ADMIN — QUETIAPINE FUMARATE 300 MG: 300 TABLET ORAL at 22:24

## 2021-04-02 RX ADMIN — HYDROXYZINE HYDROCHLORIDE 50 MG: 50 TABLET ORAL at 19:59

## 2021-04-02 RX ADMIN — HYDROXYZINE HYDROCHLORIDE 50 MG: 50 TABLET ORAL at 08:37

## 2021-04-02 RX ADMIN — AMITRIPTYLINE HYDROCHLORIDE 50 MG: 50 TABLET, FILM COATED ORAL at 19:59

## 2021-04-02 RX ADMIN — GABAPENTIN 800 MG: 400 CAPSULE ORAL at 16:23

## 2021-04-02 RX ADMIN — PROPRANOLOL HYDROCHLORIDE 80 MG: 80 CAPSULE, EXTENDED RELEASE ORAL at 08:37

## 2021-04-02 RX ADMIN — BUSPIRONE HYDROCHLORIDE 15 MG: 15 TABLET ORAL at 19:59

## 2021-04-02 RX ADMIN — BUSPIRONE HYDROCHLORIDE 15 MG: 15 TABLET ORAL at 08:37

## 2021-04-02 RX ADMIN — SODIUM CHLORIDE, PRESERVATIVE FREE 10 ML: 5 INJECTION INTRAVENOUS at 08:36

## 2021-04-02 ASSESSMENT — SLEEP AND FATIGUE QUESTIONNAIRES
SLEEP PATTERN: INSOMNIA
DIFFICULTY STAYING ASLEEP: YES
AVERAGE NUMBER OF SLEEP HOURS: 0
DO YOU USE A SLEEP AID: YES

## 2021-04-02 ASSESSMENT — PAIN SCALES - GENERAL: PAINLEVEL_OUTOF10: 8

## 2021-04-02 ASSESSMENT — LIFESTYLE VARIABLES: HISTORY_ALCOHOL_USE: YES

## 2021-04-02 ASSESSMENT — PATIENT HEALTH QUESTIONNAIRE - PHQ9: SUM OF ALL RESPONSES TO PHQ QUESTIONS 1-9: 9

## 2021-04-02 NOTE — CARE COORDINATION
Arrangements completed for transfer to [facility]. [facility] ambulance will transport at 8:00PM tonight. Faxed application for voluntary admission to [phone]. Patient informed and he is in agreement.

## 2021-04-02 NOTE — PROGRESS NOTES
Department of Psychiatry  Consult Service  Progress Note     Reason for Consult: Suicidal ideation    SUBJECTIVE:    Patient continues report that he has strong urges to go get alcohol after he leaves here. Reports that he gets himself into a very unsafe situation such as wanting to hurt other people or hurt himself when he is intoxicated. Reports his only way that he preston the loss of his wife and son. Reports he does not want to hurt anybody however when he is inebriated he does not know how to control it. He strongly believes that if he leaves here today he is going to go down the path of drinking and putting himself in unsafe situations where he might be hurting other people or himself. Continues to agree for inpatient psychiatric hospitalization.   OBJECTIVE      Medications  Current Facility-Administered Medications: chlordiazePOXIDE (LIBRIUM) capsule 5 mg, 5 mg, Oral, Q6H PRN  levETIRAcetam (KEPPRA) tablet 500 mg, 500 mg, Oral, BID  magnesium oxide (MAG-OX) tablet 400 mg, 400 mg, Oral, Daily  naproxen (NAPROSYN) tablet 500 mg, 500 mg, Oral, BID WC  cloNIDine (CATAPRES) 0.2 MG/24HR 1 patch, 1 patch, Transdermal, Weekly  nicotine polacrilex (COMMIT) lozenge 2 mg, 2 mg, Oral, PRN  QUEtiapine (SEROQUEL) tablet 300 mg, 300 mg, Oral, Nightly  sodium chloride flush 0.9 % injection 10 mL, 10 mL, Intravenous, 2 times per day  sodium chloride flush 0.9 % injection 10 mL, 10 mL, Intravenous, PRN  0.9 % sodium chloride infusion, 25 mL, Intravenous, PRN  dicyclomine (BENTYL) injection 20 mg, 20 mg, Intramuscular, Once  amitriptyline (ELAVIL) tablet 50 mg, 50 mg, Oral, Nightly  busPIRone (BUSPAR) tablet 15 mg, 15 mg, Oral, TID  hydrOXYzine (ATARAX) tablet 50 mg, 50 mg, Oral, BID  propranolol (INDERAL LA) extended release capsule 80 mg, 80 mg, Oral, Daily  sertraline (ZOLOFT) tablet 50 mg, 50 mg, Oral, Daily  albuterol (PROVENTIL) nebulizer solution 2.5 mg, 2.5 mg, Nebulization, Q4H PRN  tamsulosin (FLOMAX) capsule 0.4 mg, 0.4 mg, Oral, Daily  lamoTRIgine (LAMICTAL) tablet 100 mg, 100 mg, Oral, BID  sodium chloride flush 0.9 % injection 10 mL, 10 mL, Intravenous, 2 times per day  sodium chloride flush 0.9 % injection 10 mL, 10 mL, Intravenous, PRN  0.9 % sodium chloride infusion, 25 mL, Intravenous, PRN  enoxaparin (LOVENOX) injection 40 mg, 40 mg, Subcutaneous, Daily  acetaminophen (TYLENOL) tablet 650 mg, 650 mg, Oral, Q4H PRN  gabapentin (NEURONTIN) capsule 800 mg, 800 mg, Oral, TID  [COMPLETED] predniSONE (DELTASONE) tablet 20 mg, 20 mg, Oral, Daily **FOLLOWED BY** predniSONE (DELTASONE) tablet 15 mg, 15 mg, Oral, Daily **FOLLOWED BY** [START ON 4/5/2021] predniSONE (DELTASONE) tablet 10 mg, 10 mg, Oral, Daily **FOLLOWED BY** [START ON 4/8/2021] predniSONE (DELTASONE) tablet 5 mg, 5 mg, Oral, Daily  sodium chloride flush 0.9 % injection 10 mL, 10 mL, Intravenous, PRN  albuterol sulfate  (90 Base) MCG/ACT inhaler 2 puff, 2 puff, Inhalation, Q4H PRN  ipratropium (ATROVENT HFA) 17 MCG/ACT inhaler 2 puff, 2 puff, Inhalation, Q4H PRN     Physical  /80   Pulse 87   Temp 98.8 °F (37.1 °C) (Oral)   Resp 18   Ht 5' 10\" (1.778 m)   Wt 170 lb (77.1 kg)   SpO2 97%   BMI 24.39 kg/m²     Mental Status Examination:    Level of consciousness:  Within normal limits  Appearance: good grooming  Behavior/Motor: No abnormalities noted  Attitude toward examiner:  cooperative  Speech:  Spontaneous, normal rate and volume  Mood: Anxious  Affect:  Full range  Thought processes: coherent  Thought content: Passive suicidal ideations, passive homicidal ideations.    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 He meets criteria for inpatient admission. Please call [phone] and transfer the patient after he is medically stable. Electronically signed by Flynn Tamayo on 4/2/2021 at 3:29 PM time spent 25 minutes      Lillie Batista is a 50 y.o. male being evaluated by a Virtual Visit (video visit) encounter to address concerns as mentioned above. A caregiver was present in the room along with the patient. Pursuant to the emergency declaration under the [facility], [address] [place] [address] authority and the [employer] Act, this Virtual Visit was conducted with patient's (and/or legal guardian's) consent, to reduce the patient's risk of exposure to COVID-19 and provide necessary medical care. Services were provided through a video synchronous discussion virtually to substitute for in-person visit by provider. Patient is present at [address] and I am physically present at [facility]    --Flynn Tamayo MD on 4/2/2021 at 3:32 PM    An electronic signature was used to authenticate this note. **This report has been created using voice recognition software. It may contain minor errors which are inherent in voice recognition technology. **

## 2021-04-02 NOTE — PROGRESS NOTES
[address]  CDU / OBSERVATION ENCOUNTER  ATTENDING NOTE       I performed a history and physical examination of the patient and discussed management with the resident or midlevel provider. I reviewed the resident or midlevel provider's note and agree with the documented findings and plan of care. Any areas of disagreement are noted on the chart. I was personally present for the key portions of any procedures. I have documented in the chart those procedures where I was not present during the key portions. I have reviewed the nurses notes. I agree with the chief complaint, past medical history, past surgical history, allergies, medications, social and family history as documented unless otherwise noted below. The Family history, social history, and ROS are effectively unchanged since admission unless noted elsewhere in the chart. Shortness of breath resolved. Patient admitted for dyspnea and so far medically clear. Patient was requesting psychiatric evaluation. Patient was having withdrawal symptoms. Improved withdrawal symptoms. Patient with normal vital signs. Patient at this point is medically clear for psychiatric treatment.   Will arrange outpatient versus inpatient care depending on needs    Sara Mckeon MD  Attending Emergency  Physician

## 2021-04-02 NOTE — PROGRESS NOTES
? Symptoms significantly improved today    · Alcohol use  ? CIWA protocol - required 4mg PO Ativan last 24hr  ? Clonidine patch  ? Patient reporting desire to detox. Consult to social work for possible detox placement - patient has been denied from all detox places due to history of alcohol withdrawal seizure. Social work has provided the patient with several options for housing but there are no places for detox at this time. There is a place for him at a shelter but patient is requiring Ativan which he will not be discharged home on. Can plan for discharge to shelter today if fully detoxed    · Depression  ? Patient with worsening depression due to several stressors including his alcohol use  ? Denies current SI but has waxing and waning suicidal thoughts  ? Will contact [facility] again for possible placement when patient medically cleared    · Continue home medications and pain control  · Monitor vitals, labs, and imaging  · DISPO: pending consults and clinical improvement       --  Gosia Small  Emergency Medicine Resident Physician     This dictation was generated by voice recognition computer software. Although all attempts are made to edit the dictation for accuracy, there may be errors in the transcription that are not intended.

## 2021-04-02 NOTE — ADT AUTH CERT
denies any other symptoms.           PHYSICAL EXAM        General: NAD, AO X 3, patient walking around in room, speaking in full sentences, appears slightly anxious and agitated, diaphoretic  Heent: Normocephalic/atraumatic, pupils equal round reactive to light   Neck: SUPPLE, NO JVD  Cardiovascular: RRR, S1S2, no murmurs, gallops, rubs  Pulmonary: CTAB, NO SOB  Abdomen: SOFT, NTTP, ND, +BS  Extremities: +2/4 PULSES DISTAL, NO SWELLING  Neuro / Psych: Alert and oriented x3, answers questions appropriately, speech clear, no facial droop   Moves all extremities spontaneously, Sensation intact to light touch extremities x4, has mild tremors in bilateral hands, no tongue fasciculations       ASSESSMENT/PLAN         Jovany Yee is a 50 y. o. male who presents with multiple complaints including SOB, myalgias, arthralgias, headache, nausea and vomiting after binge drinking. Patient has extensive alcohol history but has been sober on and off for the past year.   ED work-up negative.  Admitted for symptomatic management.       ? SOB   o Aerosols PRN   o BNP WNL   o Stable troponin, normal EKG    o Symptoms significantly improved today       ? Alcohol use   o CIWA protocol - required large amounts of Ativan last night, have been able to wean him down today but still needing 1 mg every 2 hours PO, will continue as needed   o Clonidine patch   o Patient reporting desire to detox. Consult to social work for possible detox placement - patient has been denied from all detox places due to history of alcohol withdrawal seizure. Social work has provided the patient with several options for housing but there are no places for detox at this time. There is a place for him at a shelter but patient is requiring Ativan which he will not be discharged home on.  Can plan for discharge to shelter tomorrow if fully detoxed       ? Depression   o Patient with worsening depression due to several stressors including his alcohol use   o Denies 3  Heent: EMOI, PERRL   Neck: SUPPLE, NO JVD  Cardiovascular: RRR, S1S2  Pulmonary: diffuse expiratory wheezes but improved from yesterday  Abdomen: SOFT, NTTP, ND, +BS  Extremities: +2/4 PULSES DISTAL, NO SWELLING  Neuro / Psych: NO NUMBNESS OR TINGLING, MENTATION AT [facility] Joselito is a 50 y. o. male who presents with multiple complaints.  ED work-up negative.  Admitted for symptomatic management.       SOB   o Aerosols PRN   o BNP WNL   o Stable troponin, normal EKG    Alcohol use   o CIWA protocol    o Potential psychiatric placement   Patient is medically stable to transfer to facility for voluntary treatment. Continue home medications and pain control   Monitor vitals, labs, and imaging   DISPO: pending consults and clinical improvement         ===PSYCH CONSULT NOTES      Patient with complaints of some withdrawal symptoms. Patient is feeling slightly anxious and has been receiving Ativan. Patient also complains of depression and homelessness. Patient is wanting not to drink and wanting to do some rehabilitation. When I evaluate the patient he is still speaking in a calm voice and in full sentences with good attention but he is getting the nurses some difficulty in terms of feeling excitable and agitated. Patient has asked for psychiatric evaluation. Patient is not actively suicidal at this time. Patient is awaiting psychiatric evaluation and will be reassessed afterwards for disposition. Disposition will be somewhat dependent on social situation. HISTORY OF PRESENT ILLNESS:             The patient is a 50 y.o. male with significant history of alcohol use depression who is admitted medically for alcohol withdrawals.    Patient reports he has been feeling down sad and low for more days than not for last several weeks now.  Endorses anhedonia.  Identifies stressors as his wife passing away in November and currently going on a binge drinking episodes.  Reports he had history of extensive alcohol use however has been able to refrain himself since he has been to [facility] last year. Mike Sawyer reports he went on a 4-day binge where he has been drinking 10 to 11 24 ounce cans of high alcohol content beer along with vodka. Patient reports he struggled with alcohol use for many years and he was at a point where he needed a drink when he wakes up first in the morning. Patient currently endorses feelings of helplessness hopelessness worthlessness.  Endorses constant suicidal thoughts.  Reports that plans about killing himself come and go but denies any specific intent. Denies any manic or hypomanic symptoms today.  Denies any psychotic symptoms today.       PSYCHIATRIC HISTORY:   Patient currently has no outpatient psychiatric provider. Sherry Shetty has multiple inpatient psychiatric hospitalizations.  Reports he was also had [address] in the past.  Currently not taking any psychotropic medications.  Patient also been through [facility] for detox   Lifetime Psychiatric Review of Systems            Obsessions and Compulsions: Denies        Tatyana or Hypomania: Denies      Hallucinations: Denies      Panic Attacks:  Denies      Delusions:  Denies      Phobias:  Denies      Trauma: Positive for trauma from [facility].          Mental Status Examination:  Level of consciousness:  Within normal limits   Appearance: hospital attire, lying in bed, fair grooming   Behavior/Motor:  no abnormalities noted   Attitude toward examiner:  cooperative, attentive and good eye contact   Speech:  Spontaneous, normal rate and volume   Mood: Depressed   Affect: mood congruent   Thought processes:  Linear, goal directed, coherent   Thought content: Passive suicidal ideations               Denies homicidal ideations                Denies hallucinations               denies delusions   Cognition:  Oriented to self, situation, location, date   Concentration clinically adequate   Memory age appropriate   Insight & Judgment:  fair       DSM-5 DIAGNOSIS:     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 after medical clearance.    Does not require Myles Lemming is agreeable to come to [facility] voluntarily.  If patient changes his mind and want to be discharged home, he can be discharged home after he is medically stable.         ===CIWA SCALE         03/31/21 0919   CIWA-Ar   Nausea and Vomiting 0   Tactile Disturbances 0   Tremor 4   Auditory Disturbances 0   Paroxysmal Sweats 1   Visual Disturbances 0   Anxiety 3   Headache, Fullness in Head 2   Agitation 3   Orientation and Clouding of Sensorium 0   CIWA-Ar Total 13   03/31/21 1030   CIWA-Ar   Nausea and Vomiting 0   Tactile Disturbances 0   Tremor 4   Auditory Disturbances 0   Paroxysmal Sweats 2   Visual Disturbances 0   Anxiety 4   Headache, Fullness in Head 2   Agitation 5   Orientation and Clouding of Sensorium 1   CIWA-Ar Total 18   03/31/21 1345   CIWA-Ar   Nausea and Vomiting 0   Tactile Disturbances 0   Tremor 4   Auditory Disturbances 0   Paroxysmal Sweats 2   Visual Disturbances 0   Anxiety 4   Headache, Fullness in Head 2   Agitation 4   Orientation and Clouding of Sensorium 1   CIWA-Ar Total 17   03/31/21 1549   CIWA-Ar   Nausea and Vomiting 0   Tactile Disturbances 0   Tremor 4   Auditory Disturbances 0   Paroxysmal Sweats 2   Visual Disturbances 0   Anxiety 5   Headache, Fullness in Head 2   Agitation 4   Orientation and Clouding of Sensorium 1   CIWA-Ar Total 18

## 2021-04-03 PROBLEM — F10.939 ALCOHOL WITHDRAWAL (HCC): Status: ACTIVE | Noted: 2021-04-03

## 2021-04-03 PROBLEM — F33.2 MDD (MAJOR DEPRESSIVE DISORDER), RECURRENT SEVERE, WITHOUT PSYCHOSIS (HCC): Status: ACTIVE | Noted: 2021-04-03

## 2021-04-03 PROCEDURE — 1240000000 HC EMOTIONAL WELLNESS R&B

## 2021-04-03 PROCEDURE — 6370000000 HC RX 637 (ALT 250 FOR IP): Performed by: PSYCHIATRY & NEUROLOGY

## 2021-04-03 PROCEDURE — 6370000000 HC RX 637 (ALT 250 FOR IP): Performed by: NURSE PRACTITIONER

## 2021-04-03 PROCEDURE — 99223 1ST HOSP IP/OBS HIGH 75: CPT | Performed by: PSYCHIATRY & NEUROLOGY

## 2021-04-03 RX ORDER — LORAZEPAM 2 MG/ML
2 INJECTION INTRAMUSCULAR
Status: DISCONTINUED | OUTPATIENT
Start: 2021-04-03 | End: 2021-04-04

## 2021-04-03 RX ORDER — LAMOTRIGINE 100 MG/1
100 TABLET ORAL 2 TIMES DAILY
Status: DISCONTINUED | OUTPATIENT
Start: 2021-04-03 | End: 2021-04-12 | Stop reason: HOSPADM

## 2021-04-03 RX ORDER — LORAZEPAM 1 MG/1
1 TABLET ORAL EVERY 4 HOURS PRN
Status: DISCONTINUED | OUTPATIENT
Start: 2021-04-03 | End: 2021-04-03

## 2021-04-03 RX ORDER — CHLORDIAZEPOXIDE HYDROCHLORIDE 10 MG/1
10 CAPSULE, GELATIN COATED ORAL 3 TIMES DAILY PRN
Status: DISCONTINUED | OUTPATIENT
Start: 2021-04-03 | End: 2021-04-03

## 2021-04-03 RX ORDER — LORAZEPAM 1 MG/1
2 TABLET ORAL
Status: DISCONTINUED | OUTPATIENT
Start: 2021-04-03 | End: 2021-04-04

## 2021-04-03 RX ORDER — NAPROXEN 500 MG/1
500 TABLET ORAL 2 TIMES DAILY WITH MEALS
Status: DISCONTINUED | OUTPATIENT
Start: 2021-04-03 | End: 2021-04-12 | Stop reason: HOSPADM

## 2021-04-03 RX ORDER — AMITRIPTYLINE HYDROCHLORIDE 25 MG/1
50 TABLET, FILM COATED ORAL NIGHTLY
Status: DISCONTINUED | OUTPATIENT
Start: 2021-04-03 | End: 2021-04-07

## 2021-04-03 RX ORDER — M-VIT,TX,IRON,MINS/CALC/FOLIC 27MG-0.4MG
1 TABLET ORAL DAILY
Status: DISCONTINUED | OUTPATIENT
Start: 2021-04-03 | End: 2021-04-12 | Stop reason: HOSPADM

## 2021-04-03 RX ORDER — LORAZEPAM 2 MG/ML
4 INJECTION INTRAMUSCULAR
Status: DISCONTINUED | OUTPATIENT
Start: 2021-04-03 | End: 2021-04-04

## 2021-04-03 RX ORDER — HALOPERIDOL 10 MG/1
5 TABLET ORAL EVERY 4 HOURS PRN
Status: DISCONTINUED | OUTPATIENT
Start: 2021-04-03 | End: 2021-04-06

## 2021-04-03 RX ORDER — HALOPERIDOL 5 MG/ML
5 INJECTION INTRAMUSCULAR EVERY 4 HOURS PRN
Status: DISCONTINUED | OUTPATIENT
Start: 2021-04-03 | End: 2021-04-12 | Stop reason: HOSPADM

## 2021-04-03 RX ORDER — LORAZEPAM 1 MG/1
1 TABLET ORAL
Status: DISCONTINUED | OUTPATIENT
Start: 2021-04-03 | End: 2021-04-04

## 2021-04-03 RX ORDER — LEVETIRACETAM 500 MG/1
500 TABLET ORAL 2 TIMES DAILY
Status: DISCONTINUED | OUTPATIENT
Start: 2021-04-03 | End: 2021-04-12 | Stop reason: HOSPADM

## 2021-04-03 RX ORDER — BUSPIRONE HYDROCHLORIDE 15 MG/1
15 TABLET ORAL 3 TIMES DAILY
Status: DISCONTINUED | OUTPATIENT
Start: 2021-04-03 | End: 2021-04-12 | Stop reason: HOSPADM

## 2021-04-03 RX ORDER — QUETIAPINE 400 MG/1
400 TABLET, FILM COATED, EXTENDED RELEASE ORAL NIGHTLY
Status: DISCONTINUED | OUTPATIENT
Start: 2021-04-03 | End: 2021-04-06

## 2021-04-03 RX ORDER — LORAZEPAM 1 MG/1
4 TABLET ORAL
Status: DISCONTINUED | OUTPATIENT
Start: 2021-04-03 | End: 2021-04-04

## 2021-04-03 RX ORDER — PREDNISONE 10 MG/1
10 TABLET ORAL DAILY
Status: DISCONTINUED | OUTPATIENT
Start: 2021-04-05 | End: 2021-04-08 | Stop reason: ALTCHOICE

## 2021-04-03 RX ORDER — LORAZEPAM 2 MG/ML
1 INJECTION INTRAMUSCULAR
Status: DISCONTINUED | OUTPATIENT
Start: 2021-04-03 | End: 2021-04-04

## 2021-04-03 RX ORDER — ALBUTEROL SULFATE 90 UG/1
2 AEROSOL, METERED RESPIRATORY (INHALATION) EVERY 6 HOURS PRN
Status: DISCONTINUED | OUTPATIENT
Start: 2021-04-03 | End: 2021-04-12 | Stop reason: HOSPADM

## 2021-04-03 RX ORDER — LORAZEPAM 1 MG/1
3 TABLET ORAL
Status: DISCONTINUED | OUTPATIENT
Start: 2021-04-03 | End: 2021-04-04

## 2021-04-03 RX ORDER — GABAPENTIN 400 MG/1
800 CAPSULE ORAL 3 TIMES DAILY
Status: DISCONTINUED | OUTPATIENT
Start: 2021-04-03 | End: 2021-04-12 | Stop reason: HOSPADM

## 2021-04-03 RX ORDER — FLUTICASONE PROPIONATE 50 MCG
1 SPRAY, SUSPENSION (ML) NASAL DAILY
Status: DISCONTINUED | OUTPATIENT
Start: 2021-04-03 | End: 2021-04-12 | Stop reason: HOSPADM

## 2021-04-03 RX ORDER — CHLORDIAZEPOXIDE HYDROCHLORIDE 25 MG/1
25 CAPSULE, GELATIN COATED ORAL 4 TIMES DAILY
Status: DISCONTINUED | OUTPATIENT
Start: 2021-04-03 | End: 2021-04-03

## 2021-04-03 RX ORDER — PROPRANOLOL HYDROCHLORIDE 20 MG/1
80 TABLET ORAL DAILY
Status: DISCONTINUED | OUTPATIENT
Start: 2021-04-03 | End: 2021-04-12 | Stop reason: HOSPADM

## 2021-04-03 RX ORDER — LORAZEPAM 2 MG/ML
1 INJECTION INTRAMUSCULAR EVERY 4 HOURS PRN
Status: DISCONTINUED | OUTPATIENT
Start: 2021-04-03 | End: 2021-04-03

## 2021-04-03 RX ORDER — LORAZEPAM 2 MG/ML
3 INJECTION INTRAMUSCULAR
Status: DISCONTINUED | OUTPATIENT
Start: 2021-04-03 | End: 2021-04-04

## 2021-04-03 RX ORDER — PREDNISONE 1 MG/1
5 TABLET ORAL DAILY
Status: COMPLETED | OUTPATIENT
Start: 2021-04-08 | End: 2021-04-11

## 2021-04-03 RX ORDER — GAUZE BANDAGE 2" X 2"
100 BANDAGE TOPICAL DAILY
Status: DISCONTINUED | OUTPATIENT
Start: 2021-04-03 | End: 2021-04-12 | Stop reason: HOSPADM

## 2021-04-03 RX ADMIN — ALBUTEROL SULFATE 2 PUFF: 90 AEROSOL, METERED RESPIRATORY (INHALATION) at 10:30

## 2021-04-03 RX ADMIN — GABAPENTIN 800 MG: 400 CAPSULE ORAL at 20:58

## 2021-04-03 RX ADMIN — LEVETIRACETAM 500 MG: 500 TABLET ORAL at 20:58

## 2021-04-03 RX ADMIN — CHLORDIAZEPOXIDE HYDROCHLORIDE 15 MG: 10 CAPSULE ORAL at 20:58

## 2021-04-03 RX ADMIN — QUETIAPINE FUMARATE 400 MG: 400 TABLET, EXTENDED RELEASE ORAL at 20:58

## 2021-04-03 RX ADMIN — HYDROXYZINE HYDROCHLORIDE 50 MG: 50 TABLET, FILM COATED ORAL at 20:58

## 2021-04-03 RX ADMIN — AMITRIPTYLINE HYDROCHLORIDE 50 MG: 25 TABLET, FILM COATED ORAL at 20:58

## 2021-04-03 RX ADMIN — BUSPIRONE HYDROCHLORIDE 15 MG: 15 TABLET ORAL at 20:58

## 2021-04-03 RX ADMIN — LAMOTRIGINE 100 MG: 100 TABLET ORAL at 20:58

## 2021-04-03 NOTE — BH NOTE
RT ASSESSMENT TREATMENT GOALS    [x]Pt Goal: Pt will identify 1-2 positive coping skills by time of discharge. []Pt Goal: Pt will identify 1-2 positive aspects of self by time of discharge. []Pt Goal: Pt will remain on task/topic for 15-30 minutes during group by time of discharge. [x]Pt Goal: Pt will identify 1-2 aspects of relapse prevention plan by time of discharge. [x]Pt Goal: Pt will join in harmonious conversation with peers 1-2 times per group by time of discharge. []Pt Goal: Pt will identify 1-2 new leisure interests by time of discharge. []Pt Goal: Pt will not voice any delusional content by time of discharge.
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
## 2021-04-03 NOTE — BH NOTE
Patient given tobacco quitline number 35018869278 at this time, refusing to call at this time, states \" I just dont want to quit now\"- patient given information as to the dangers of long term tobacco use. Continue to reinforce the importance of tobacco cessation.
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
## 2021-04-03 NOTE — PLAN OF CARE
Problem: Depressive Behavior With or Without Suicide Precautions:  Goal: Ability to disclose and discuss suicidal ideas will improve  Description: Ability to disclose and discuss suicidal ideas will improve  4/3/2021 1536 by Bethel Genao  Outcome: Ongoing   Patient was irritable this morning, and state that he is \"aggressive and feels like 22 out of 21\" He was upset about the time his medications were given but then refused to take his medications. He hit the wall in his room numerous times, but with something wrapped around his hand. He was asked to not punch the wall and the patient complied, and was able to calm himself down. After that he has been isolative to his room for most of the day. Patient denies suicidal ideation at this time. Patient is fifteen minute safety check.

## 2021-04-03 NOTE — PLAN OF CARE
[facility]  Initial Interdisciplinary Treatment Plan NO      Original treatment plan Date & Time: 4/3/2021                734AM    Admission Type:  Admission Type: Involuntary    Reason for admission:   Reason for Admission: SI no plan    Estimated Length of Stay:  5-7days  Estimated Discharge Date: to be determined by physician    PATIENT STRENGTHS:  Patient Strengths:Strengths: Positive Support, No significant Physical Illness  Patient Strengths and Limitations:Limitations: Tendency to isolate self, Lacks leisure interests, Difficulty problem solving/relies on others to help solve problems, Hopeless about future, Multiple barriers to leisure interests, Inappropriate/potentially harmful leisure interests (depression substance abuse anxiety poor coping skills)  Addictive Behavior: Addictive Behavior  In the past 3 months, have you felt or has someone told you that you have a problem with:  : None  Do you have a history of Chemical Use?: No  Do you have a history of Alcohol Use?: No  Do you have a history of Street Drug Abuse?: Yes  Histroy of Prescripton Drug Abuse?: No  Medical Problems:No past medical history on file.   Status EXAM:Status and Exam  Normal: No  Facial Expression: Elevated  Affect: Inappropriate  Level of Consciousness: Alert  Mood:Normal: No  Mood: Depressed, Anxious  Motor Activity:Normal: No  Motor Activity: Decreased  Interview Behavior: Cooperative  Preception: [place] to Person, Wyona Bean to Time, [place] to Place, [place] to Situation  Attention:Normal: Yes  Attention: Distractible  Thought Processes: Circumstantial  Thought Content:Normal: Yes  Thought Content: Preoccupations  Hallucinations: None  Delusions: No  Memory:Normal: Yes  Memory: Poor Recent, Confabulation  Insight and Judgment: No  Insight and Judgment: Unmotivated  Present Suicidal Ideation: No  Present Homicidal Ideation: No    EDUCATION:   Learner Progress Toward Treatment Goals: reviewed group plans and strategies for care    Method:group therapy, medication compliance, individualized assessments and care planning    Outcome: needs reinforcement    PATIENT GOALS: to be discussed with patient within 72 hours    PLAN/TREATMENT RECOMMENDATIONS:     continue group therapy , medications compliance, goal setting, individualized assessments and care, continue to monitor pt on unit      SHORT-TERM GOALS:   Time frame for Short-Term Goals: 5-7 days    LONG-TERM GOALS:  Time frame for Long-Term Goals: 6 months  Members Present in Team Meeting: See Signature Sheet

## 2021-04-03 NOTE — GROUP NOTE
Group Therapy Note    Date: 4/3/2021    Group Start Time: 1500  Group End Time: 4103  Group Topic: Cognitive Skills    MARY Rollins, CTRS        Group Therapy Note    Attendees: 7/19         Pt did not participate in Cognitive Skills Group at 1500 when encouraged by RT . Pt was offered talk time as an alternative to group but declined.        Discipline Responsible: Psychoeducational Specialist        Signature:  Shyla Franco

## 2021-04-04 PROCEDURE — 1240000000 HC EMOTIONAL WELLNESS R&B

## 2021-04-04 PROCEDURE — 99231 SBSQ HOSP IP/OBS SF/LOW 25: CPT | Performed by: NURSE PRACTITIONER

## 2021-04-04 PROCEDURE — 6370000000 HC RX 637 (ALT 250 FOR IP): Performed by: NURSE PRACTITIONER

## 2021-04-04 PROCEDURE — 6360000002 HC RX W HCPCS: Performed by: PSYCHIATRY & NEUROLOGY

## 2021-04-04 PROCEDURE — 6370000000 HC RX 637 (ALT 250 FOR IP): Performed by: PSYCHIATRY & NEUROLOGY

## 2021-04-04 RX ORDER — CHLORDIAZEPOXIDE HYDROCHLORIDE 10 MG/1
10 CAPSULE, GELATIN COATED ORAL 3 TIMES DAILY
Status: DISPENSED | OUTPATIENT
Start: 2021-04-04 | End: 2021-04-05

## 2021-04-04 RX ADMIN — ALBUTEROL SULFATE 2 PUFF: 90 AEROSOL, METERED RESPIRATORY (INHALATION) at 13:16

## 2021-04-04 RX ADMIN — BUSPIRONE HYDROCHLORIDE 15 MG: 15 TABLET ORAL at 20:32

## 2021-04-04 RX ADMIN — LEVETIRACETAM 500 MG: 500 TABLET ORAL at 20:32

## 2021-04-04 RX ADMIN — HYDROXYZINE HYDROCHLORIDE 50 MG: 50 TABLET, FILM COATED ORAL at 07:20

## 2021-04-04 RX ADMIN — BUSPIRONE HYDROCHLORIDE 15 MG: 15 TABLET ORAL at 13:26

## 2021-04-04 RX ADMIN — LORAZEPAM 2 MG: 2 INJECTION INTRAMUSCULAR; INTRAVENOUS at 08:05

## 2021-04-04 RX ADMIN — QUETIAPINE FUMARATE 400 MG: 400 TABLET, EXTENDED RELEASE ORAL at 20:32

## 2021-04-04 RX ADMIN — AMITRIPTYLINE HYDROCHLORIDE 50 MG: 25 TABLET, FILM COATED ORAL at 20:32

## 2021-04-04 RX ADMIN — LORAZEPAM 1 MG: 1 TABLET ORAL at 13:30

## 2021-04-04 RX ADMIN — IPRATROPIUM BROMIDE 2 PUFF: 17 AEROSOL, METERED RESPIRATORY (INHALATION) at 06:55

## 2021-04-04 RX ADMIN — HYDROXYZINE HYDROCHLORIDE 50 MG: 50 TABLET, FILM COATED ORAL at 17:38

## 2021-04-04 RX ADMIN — GABAPENTIN 800 MG: 400 CAPSULE ORAL at 13:27

## 2021-04-04 RX ADMIN — HALOPERIDOL LACTATE 5 MG: 5 INJECTION, SOLUTION INTRAMUSCULAR at 08:05

## 2021-04-04 RX ADMIN — CHLORDIAZEPOXIDE HYDROCHLORIDE 10 MG: 10 CAPSULE ORAL at 20:32

## 2021-04-04 RX ADMIN — HYDROXYZINE HYDROCHLORIDE 50 MG: 50 TABLET, FILM COATED ORAL at 22:00

## 2021-04-04 RX ADMIN — CHLORDIAZEPOXIDE HYDROCHLORIDE 15 MG: 10 CAPSULE ORAL at 13:26

## 2021-04-04 RX ADMIN — ALBUTEROL SULFATE 2 PUFF: 90 AEROSOL, METERED RESPIRATORY (INHALATION) at 06:54

## 2021-04-04 RX ADMIN — GABAPENTIN 800 MG: 400 CAPSULE ORAL at 20:32

## 2021-04-04 RX ADMIN — LAMOTRIGINE 100 MG: 100 TABLET ORAL at 20:32

## 2021-04-04 NOTE — PROGRESS NOTES
Medical History:   Diagnosis Date    COPD (chronic obstructive pulmonary disease) ([facility]     Hep B w/ coma     Seizures (HCC)        FAMILY/SOCIAL HISTORY:  No family history on file. Social History     Socioeconomic History    Marital status:      Spouse name: Not on file    Number of children: Not on file    Years of education: Not on file    Highest education level: Not on file   Occupational History    Not on file   Social Needs    Financial resource strain: Not on file    Food insecurity     Worry: Not on file     Inability: Not on file    Transportation needs     Medical: Not on file     Non-medical: Not on file   Tobacco Use    Smoking status: Current Every Day Smoker     Packs/day: 1.00     Types: Cigarettes    Smokeless tobacco: Never Used   Substance and Sexual Activity    Alcohol use: Yes     Frequency: Never    Drug use: Yes     Types: Marijuana    Sexual activity: Not on file   Lifestyle    Physical activity     Days per week: Not on file     Minutes per session: Not on file    Stress: Not on file   Relationships    Social connections     Talks on phone: Not on file     Gets together: Not on file     Attends Faith service: Not on file     Active member of club or organization: Not on file     Attends meetings of clubs or organizations: Not on file     Relationship status: Not on file    Intimate partner violence     Fear of current or ex partner: Not on file     Emotionally abused: Not on file     Physically abused: Not on file     Forced sexual activity: Not on file   Other Topics Concern    Not on file   Social History Narrative    Not on file           ROS:  [x] All negative/unchanged except if checked.  Explain positive(checked items) below:  [x] Constitutional  [x] Eyes  [] Ear/Nose/Mouth/Throat  [] Respiratory  [] CV  [x] GI  []   [] Musculoskeletal  [] Skin/Breast  [x] Neurological  [] Endocrine  [] Heme/Lymph  [] Allergic/Immunologic    Explanation: Nausea and vomiting, sensitivity to light and sound, tremor, headache    MEDICATIONS:    Current Facility-Administered Medications:     chlordiazePOXIDE (LIBRIUM) capsule 10 mg, 10 mg, Oral, TID, THERESA Lawson - CNP    albuterol sulfate  (90 Base) MCG/ACT inhaler 2 puff, 2 puff, Inhalation, Q6H PRN, Vernard Haste, APRN - CNP, 2 puff at 04/04/21 1316    amitriptyline (ELAVIL) tablet 50 mg, 50 mg, Oral, Nightly, Vernard Haste, APRN - CNP, 50 mg at 04/03/21 2058    busPIRone (BUSPAR) tablet 15 mg, 15 mg, Oral, TID, Vernard Haste, APRN - CNP, 15 mg at 04/04/21 1326    fluticasone (FLONASE) 50 MCG/ACT nasal spray 1 spray, 1 spray, Each Nostril, Daily, Stone Sanders, APRN - CNP, Stopped at 04/04/21 1059    gabapentin (NEURONTIN) capsule 800 mg, 800 mg, Oral, TID, Vernard Haste, APRN - CNP, 800 mg at 04/04/21 1327    ipratropium (ATROVENT HFA) 17 MCG/ACT inhaler 2 puff, 2 puff, Inhalation, Q4H PRN, Vernard Haste, APRN - CNP, 2 puff at 04/04/21 0655    lamoTRIgine (LAMICTAL) tablet 100 mg, 100 mg, Oral, BID, Vernard Haste, APRN - CNP, Stopped at 04/04/21 1059    levETIRAcetam (KEPPRA) tablet 500 mg, 500 mg, Oral, BID, Vernard Haste, APRN - CNP, Stopped at 04/04/21 1059    magnesium oxide (MAG-OX) tablet 400 mg, 400 mg, Oral, Daily, Vernard Haste, APRN - CNP, Stopped at 04/04/21 1100    naproxen (NAPROSYN) tablet 500 mg, 500 mg, Oral, BID WC, Vernard Haste, APRN - CNP, Stopped at 04/04/21 1100    predniSONE (DELTASONE) tablet 15 mg, 15 mg, Oral, Daily, Vernard Haste, APRN - CNP, Stopped at 04/04/21 1101    [START ON 4/5/2021] predniSONE (DELTASONE) tablet 10 mg, 10 mg, Oral, Daily, THERESA Tobias CNP  [facility] ON 4/8/2021] predniSONE (DELTASONE) tablet 5 mg, 5 mg, Oral, Daily, THERESA Tobias CNP    propranolol (INDERAL) tablet 80 mg, 80 mg, Oral, Daily, THERESA Tobias CNP    QUEtiapine (SEROQUEL XR) extended release tablet 400 mg, 400 mg, Oral, Nightly, Joy , APRN - CNP, 400 mg at 04/03/21 2058    haloperidol lactate (HALDOL) injection 5 mg, 5 mg, Intramuscular, Q4H PRN, 5 mg at 04/04/21 0805 **OR** haloperidol (HALDOL) tablet 5 mg, 5 mg, Oral, Q4H PRN, Amy Montoya MD    thiamine mononitrate tablet 100 mg, 100 mg, Oral, Daily, Amy Montoya MD, Stopped at 04/04/21 1102    therapeutic multivitamin-minerals 1 tablet, 1 tablet, Oral, Daily, Amy Montoya MD, Stopped at 04/04/21 1102    chlordiazePOXIDE (LIBRIUM) capsule 15 mg, 15 mg, Oral, 4x Daily, Amy Montoya MD, 15 mg at 04/04/21 1326    acetaminophen (TYLENOL) tablet 650 mg, 650 mg, Oral, Q4H PRN, Aries Bess MD    aluminum & magnesium hydroxide-simethicone (MAALOX) 200-200-20 MG/5ML suspension 30 mL, 30 mL, Oral, Q6H PRN, Aries Bess MD    hydrOXYzine (ATARAX) tablet 50 mg, 50 mg, Oral, TID PRN, Aries Bess MD, 50 mg at 04/04/21 0720    ibuprofen (ADVIL;MOTRIN) tablet 400 mg, 400 mg, Oral, Q6H PRN, Aries Bess MD    nicotine polacrilex (NICORETTE) gum 2 mg, 2 mg, Oral, PRN, Aries Bess MD, 2 mg at 04/02/21 2224    polyethylene glycol (GLYCOLAX) packet 17 g, 17 g, Oral, Daily PRN, Aries Bess MD      Examination:  BP (!) 80/49   Pulse 98   Temp 98.1 °F (36.7 °C) (Oral)   Resp 14   Ht 5' 9\" (1.753 m)   Wt 169 lb (76.7 kg)   BMI 24.96 kg/m²   Gait - steady denies  Medication side effects(SE):     Mental Status Examination:    Level of consciousness:  within normal limits   Appearance: Casually dressed with fair grooming and hygiene  Behavior/Motor:  Angry, bilateral tremor upper extremities  Attitude toward examiner: Irritable but cooperative  Mood: Depressed  Affect: Mood congruent  Thought processes: Circumstantial  Thought content:  Homicidal ideation - none, threatened peer today  Suicidal Ideation: Passive, contracts for safety  Delusions: Denies, none observed or reported by staff  Perceptual Disturbance: Denies auditory, visual or tactile hallucinations  Cognition: Oriented to person, location general circumstance  Concentration distractible  Insight poor  Judgement poor    ASSESSMENT:   Patient symptoms are:  [] Well controlled  [] Improving  [] Worsening  [x] No change      Diagnosis:  Principal Problem:    MDD (major depressive disorder), recurrent severe, without psychosis ([facility]  Active Problems:    Depression with suicidal ideation    Alcohol withdrawal ([facility]  Resolved Problems:    * No resolved hospital problems. *      LABS:    No results for input(s): WBC, HGB, PLT in the last 72 hours. No results for input(s): NA, K, CL, CO2, BUN, CREATININE, GLUCOSE in the last 72 hours. No results for input(s): BILITOT, ALKPHOS, AST, ALT in the last 72 hours. Lab Results   Component Value Date    BARBSCNU NEGATIVE 09/06/2020    LABBENZ NEGATIVE 09/06/2020    LABMETH NEGATIVE 09/06/2020    PPXUR NOT REPORTED 09/06/2020     No results found for: TSH, FREET4  No results found for: LITHIUM  Lab Results   Component Value Date    VALPROATE 60 09/06/2020       Treatment Plan:  Reviewed current Medications with the patient. Stop Ativan for withdrawal as needed. Start Librium 10 mg 3 times a day x4 doses, will reassess tomorrow and taper down as tolerated. Risks, benefits, side effects, drug-to-drug interactions and alternatives to treatment were discussed. The patient  consented to treatment. Encourage patient to attend group and other milieu activities.   Discharge planning discussed with the patient and treatment team.  Daily while inpatient    PSYCHOTHERAPY/COUNSELING:  [] Therapeutic interview  [x] Supportive  [] CBT  [] Ongoing  [] Other    [x] Patient continues to need, on a daily basis, active treatment furnished directly by or requiring the supervision of inpatient psychiatric personnel      Anticipated Length of stay:Per attending psychiatric physician Odalis Crespo is a 50 y.o. male being evaluated face to face. --Carlos Bhardwaj, APRN - CNP on 4/4/2021 at 3:51 PM    An electronic signature was used to authenticate this note. **This report has been created using voice recognition software. It may contain minor errors which are inherent in voice recognition technology. **

## 2021-04-04 NOTE — PLAN OF CARE
[address] [place] [address]  Day 3 Interdisciplinary Treatment Plan NOTE    Review Date & Time: 4/4/2021 1429    Admission Type:   Admission Type: Voluntary    Reason for admission:  Reason for Admission: Alcohol abuse and depression  Estimated Length of Stay: 5-7 days  Estimated Discharge Date Update: to be determined by physician    PATIENT STRENGTHS:  Patient Strengths Strengths: Communication  Patient Strengths and Limitations:Limitations: Difficult relationships / poor social skills, Multiple barriers to leisure interests, Hopeless about future, Tendency to isolate self  Addictive Behavior:Addictive Behavior  In the past 3 months, have you felt or has someone told you that you have a problem with:  : None  Do you have a history of Chemical Use?: Yes(Pt reports using pot sporadically)  Do you have a history of Alcohol Use?: Yes(Pt reports extensive alcohol use reporting using a gallon or more of vodka and beer a day.)  Do you have a history of Street Drug Abuse?: No  Histroy of Prescripton Drug Abuse?: Yes(Pt reports usng suboxone)  Medical Problems:  Past Medical History:   Diagnosis Date    COPD (chronic obstructive pulmonary disease) ([facility]     Hep B w/ coma     Seizures ([facility]        Risk:  Fall RiskTotal: 77  Wade Scale Wade Scale Score: 22  BVC Total: 0  Change in scores no Changes to plan of Care no    Status EXAM:   Status and Exam  Normal: No  Facial Expression: Flat  Affect: Appropriate  Level of Consciousness: Alert  Mood:Normal: No  Mood: Depressed, Anxious  Motor Activity:Normal: Yes  Motor Activity: Tremors  Interview Behavior: Cooperative  Preception: [place] to Person, [place] to Time, [place] to Place, [place] to Situation  Attention:Normal: No  Attention: Distractible  Thought Processes: Circumstantial  Thought Content:Normal: Yes  Hallucinations: None  Delusions: No  Memory:Normal: Yes  Memory: Poor Recent  Insight and Judgment: No  Insight and Judgment: Poor Judgment, Poor Insight  Present Suicidal Ideation: No  Present Homicidal Ideation: No    Daily Assessment Last Entry:   Daily Sleep (WDL): Within Defined Limits         Patient Currently in Pain: Denies  Daily Nutrition (WDL): Within Defined Limits    Patient Monitoring:  Frequency of Checks: 4 times per hour, close    Psychiatric Symptoms:   Depression Symptoms  Depression Symptoms: Loss of interest, Impaired concentration  Anxiety Symptoms  Anxiety Symptoms: Generalized  Tatyana Symptoms  Tatyana Symptoms: No problems reported or observed. Psychosis Symptoms  Delusion Type: No problems reported or observed. Suicide Risk CSSR-S:  1) Within the past month, have you wished you were dead or wished you could go to sleep and not wake up? : No  2) Have you actually had any thoughts of killing yourself? : No  6) Have you ever done anything, started to do anything, or prepared to do anything to end your life?: No  Change in Result No Change in Plan of care No      EDUCATION:   EDUCATION:   Learner Progress Toward Treatment Goals: Reviewed results and recommendations of this team, Reviewed group plan and strategies, Reviewed signs, symptoms and risk of self harm and violent behavior, Reviewed goals and plan of care    Method:small group, individual verbal education    Outcome:verbalized by patient, but needs reinforcement to obtain goals    PATIENT GOALS:  Short term:  To find inpatient treatment  Long term: Stable housing     PLAN/TREATMENT RECOMMENDATIONS UPDATE: continue with group therapies, increased socialization, continue planning for after discharge goals, continue with medication compliance    SHORT-TERM GOALS UPDATE:   Time frame for Short-Term Goals: 5-7 days    LONG-TERM GOALS UPDATE:   Time frame for Long-Term Goals: 6 months  Members Present in Team Meeting: See Signature Sheet    Alfie Rodriguez

## 2021-04-04 NOTE — BH NOTE
1:1  - Writer meets with client on this date and time. Client was very engaging and apologized for his actions yesterday. - Client is new to this writer and thanks him for taking time to talk about his issues where he needs assistance. Client reports \"I've made a mess out of my life and I don't know where to start\". Writer talks with client about inpatient AOD treatment and reviews some of the local and [facility] communities that have good programs. Client is interested and we will continue to discuss early this week. - Client reports his wife Drema Homans  in 2020 when he was in [facility] and \"my daughter's didn't tell me\". He states a friend of a friend contacted me in [facility] and left me a message 4-days after she . - Client reports his son Shama Phillips  in Aug. 2018. Client reports 2 daughters Jeffry Osuna and Leanne Suárez and they are [place] good young ladies, but have nothing to do with me, and they turned me into the police\". - Client states he was misusing Suboxone and making some type of marijuana oils that were 95% proof. Client states his daughters called the police, he was charged and spent 2-years in [facility]. Client reports while in [facility] he lost his house where he had 32 of the 30-year mortgage paid off but his daughters sold it and took all the belonging.  - Client states he has not \"shed 1 tear for my wife or my son and has not dealt with their deaths\". 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 Client states he received two \"[employer]\" awards, which is rare and similar to a Purple Heart. 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

## 2021-04-04 NOTE — PLAN OF CARE
Problem: Depressive Behavior With or Without Suicide Precautions:  Goal: Ability to disclose and discuss suicidal ideas will improve  Description: Ability to disclose and discuss suicidal ideas will improve  4/4/2021 1044 by Art Omer LPN  Outcome: Ongoing  Note: Patient denies any suicidal thoughts and agrees to seek out staff if thoughts arise. Patient is depressed and anxious and denies any hallucinations. Patient has been compliant with medications and staff.

## 2021-04-04 NOTE — BH NOTE
PRN Haldol and Ativan IM given to patient due to patient in day area stating \"I am about to go the fuck off because they want to watch cartoons and I want to watch the news. \" Patient is focused on other patient watching cartoons, threatening, states \"If we were in the intermediate I will beat your mother fucking ass\" and sticking middle finger at patients. Alternatives including quiet time in room, 1:1 with staff and music offered to patient, patient refused and continued with behavior. [employer] and security called, present in less than 1 minute. Haldol and Ativan IM given to patient to left gluteal without further incident. Patient is currently in room resting. Will continue to monitor.

## 2021-04-04 NOTE — PLAN OF CARE
Problem: Depressive Behavior With or Without Suicide Precautions:  Goal: Ability to disclose and discuss suicidal ideas will improve  Description: Ability to disclose and discuss suicidal ideas will improve  4/3/2021 2341 by Mindi Wyatt RN  Outcome: Ongoing  Note: Pt denies suicidal ideation stating that never felt this way. Endorses depression/anxiety 10/10. Is isolative to room. Pt was irritable at first but quickly calmed down apologizing for behavior. Pt was cooperative and medication compliant. 15 minute safety rounds maintained. Will continue to monitor      Problem: Tobacco Use:  Goal: Inpatient tobacco use cessation counseling participation  Description: Inpatient tobacco use cessation counseling participation  Outcome: Ongoing  Note: Patient given tobacco quitline number 40873460821 at this time, refusing to call at this time, states \" I just dont want to quit now\"- patient given information as to the dangers of long term tobacco use. Continue to reinforce the importance of tobacco cessation.

## 2021-04-05 PROCEDURE — 90833 PSYTX W PT W E/M 30 MIN: CPT | Performed by: PSYCHIATRY & NEUROLOGY

## 2021-04-05 PROCEDURE — 99232 SBSQ HOSP IP/OBS MODERATE 35: CPT | Performed by: PSYCHIATRY & NEUROLOGY

## 2021-04-05 PROCEDURE — 6370000000 HC RX 637 (ALT 250 FOR IP): Performed by: NURSE PRACTITIONER

## 2021-04-05 PROCEDURE — 6370000000 HC RX 637 (ALT 250 FOR IP): Performed by: PSYCHIATRY & NEUROLOGY

## 2021-04-05 PROCEDURE — 1240000000 HC EMOTIONAL WELLNESS R&B

## 2021-04-05 RX ORDER — ACETAMINOPHEN, ASPIRIN AND CAFFEINE 250; 250; 65 MG/1; MG/1; MG/1
1 TABLET, FILM COATED ORAL EVERY 6 HOURS PRN
Status: DISCONTINUED | OUTPATIENT
Start: 2021-04-05 | End: 2021-04-09

## 2021-04-05 RX ORDER — CHLORDIAZEPOXIDE HYDROCHLORIDE 10 MG/1
10 CAPSULE, GELATIN COATED ORAL 4 TIMES DAILY
Status: COMPLETED | OUTPATIENT
Start: 2021-04-05 | End: 2021-04-06

## 2021-04-05 RX ADMIN — THIAMINE HCL TAB 100 MG 100 MG: 100 TAB at 08:16

## 2021-04-05 RX ADMIN — AMITRIPTYLINE HYDROCHLORIDE 50 MG: 25 TABLET, FILM COATED ORAL at 21:09

## 2021-04-05 RX ADMIN — LEVETIRACETAM 500 MG: 500 TABLET ORAL at 21:09

## 2021-04-05 RX ADMIN — MULTIPLE VITAMINS W/ MINERALS TAB 1 TABLET: TAB at 08:14

## 2021-04-05 RX ADMIN — NAPROXEN 500 MG: 500 TABLET ORAL at 08:16

## 2021-04-05 RX ADMIN — BUSPIRONE HYDROCHLORIDE 15 MG: 15 TABLET ORAL at 08:13

## 2021-04-05 RX ADMIN — CHLORDIAZEPOXIDE HYDROCHLORIDE 10 MG: 10 CAPSULE ORAL at 14:00

## 2021-04-05 RX ADMIN — CHLORDIAZEPOXIDE HYDROCHLORIDE 10 MG: 10 CAPSULE ORAL at 21:09

## 2021-04-05 RX ADMIN — CHLORDIAZEPOXIDE HYDROCHLORIDE 10 MG: 10 CAPSULE ORAL at 08:13

## 2021-04-05 RX ADMIN — PROPRANOLOL HYDROCHLORIDE 80 MG: 20 TABLET ORAL at 08:15

## 2021-04-05 RX ADMIN — BUSPIRONE HYDROCHLORIDE 15 MG: 15 TABLET ORAL at 21:09

## 2021-04-05 RX ADMIN — FLUTICASONE PROPIONATE 1 SPRAY: 50 SPRAY, METERED NASAL at 08:22

## 2021-04-05 RX ADMIN — GABAPENTIN 800 MG: 400 CAPSULE ORAL at 08:13

## 2021-04-05 RX ADMIN — LAMOTRIGINE 100 MG: 100 TABLET ORAL at 08:14

## 2021-04-05 RX ADMIN — QUETIAPINE FUMARATE 400 MG: 400 TABLET, EXTENDED RELEASE ORAL at 21:09

## 2021-04-05 RX ADMIN — LAMOTRIGINE 100 MG: 100 TABLET ORAL at 21:09

## 2021-04-05 RX ADMIN — PREDNISONE 10 MG: 5 TABLET ORAL at 08:17

## 2021-04-05 RX ADMIN — LEVETIRACETAM 500 MG: 500 TABLET ORAL at 08:15

## 2021-04-05 RX ADMIN — HYDROXYZINE HYDROCHLORIDE 50 MG: 50 TABLET, FILM COATED ORAL at 21:09

## 2021-04-05 RX ADMIN — NICOTINE POLACRILEX 2 MG: 2 GUM, CHEWING BUCCAL at 07:40

## 2021-04-05 RX ADMIN — BUSPIRONE HYDROCHLORIDE 15 MG: 15 TABLET ORAL at 14:00

## 2021-04-05 RX ADMIN — HALOPERIDOL 5 MG: 10 TABLET ORAL at 12:23

## 2021-04-05 RX ADMIN — NICOTINE POLACRILEX 2 MG: 2 GUM, CHEWING BUCCAL at 12:23

## 2021-04-05 RX ADMIN — HYDROXYZINE HYDROCHLORIDE 50 MG: 50 TABLET, FILM COATED ORAL at 12:23

## 2021-04-05 RX ADMIN — HYDROXYZINE HYDROCHLORIDE 50 MG: 50 TABLET, FILM COATED ORAL at 06:25

## 2021-04-05 RX ADMIN — IPRATROPIUM BROMIDE 2 PUFF: 17 AEROSOL, METERED RESPIRATORY (INHALATION) at 06:33

## 2021-04-05 RX ADMIN — NAPROXEN 500 MG: 500 TABLET ORAL at 17:33

## 2021-04-05 RX ADMIN — GABAPENTIN 800 MG: 400 CAPSULE ORAL at 21:09

## 2021-04-05 RX ADMIN — Medication 400 MG: at 08:13

## 2021-04-05 RX ADMIN — GABAPENTIN 800 MG: 400 CAPSULE ORAL at 14:00

## 2021-04-05 RX ADMIN — NICOTINE POLACRILEX 2 MG: 2 GUM, CHEWING BUCCAL at 13:31

## 2021-04-05 RX ADMIN — ALBUTEROL SULFATE 2 PUFF: 90 AEROSOL, METERED RESPIRATORY (INHALATION) at 06:31

## 2021-04-05 ASSESSMENT — PAIN SCALES - GENERAL: PAINLEVEL_OUTOF10: 7

## 2021-04-05 NOTE — GROUP NOTE
Group Therapy Note    Date: 4/5/2021    Group Start Time: 1430  Group End Time: 6262  Group Topic: Psychoeducation    [facility]    Hammad Feliz        Group Therapy Note    Attendees: 9/17         Patient's Goal:  To increase positive coping skills     Notes:      Status After Intervention:  Improved    Participation Level:  Active Listener and Interactive    Participation Quality: Appropriate, Attentive and Sharing      Speech:  normal      Thought Process/Content: Logical  Linear      Affective Functioning: Congruent      Mood: euthymic      Level of consciousness:  Alert, Oriented x4 and Attentive      Response to Learning: Able to verbalize current knowledge/experience, Able to verbalize/acknowledge new learning, Able to retain information and Progressing to goal      Endings: None Reported    Modes of Intervention: Education, Support, Socialization, Exploration, Clarifying, Problem-solving and Activity      Discipline Responsible: Psychoeducational Specialist      Signature:  Hammad Feliz

## 2021-04-05 NOTE — BH NOTE
Group Therapy Note     Date: 4/5/2021     Group Start Time: 1600  Group End Time: 1630  Group Topic: Wellness group     [facility] ALEXIS Couch           Group Therapy Note     Attendees: 10/19           Patient's Goal:  Wellness group     Notes: Learn new relaxation techniques    Status After Intervention:  Improved      Participation Level:  Active Listener and Interactive     Participation Quality: Appropriate, Attentive and Sharing        Speech:  normal        Thought Process/Content: Logical        Affective Functioning: Congruent        Mood: within normal limits        Level of consciousness:  Alert, Oriented x4 and Attentive        Response to Learning: Able to verbalize current knowledge/experience, Able to verbalize/acknowledge new learning, Able to retain information and Progressing to goal        Endings: None Reported     Modes of Intervention: Education, Support and Socialization        Discipline Responsible: ROSARIO        Signature: Vilma Parikh

## 2021-04-05 NOTE — GROUP NOTE
Group Therapy Note    Date: 4/5/2021    Group Start Time: 0900  Group End Time: 6572  Group Topic: Community Meeting    [address] [facility] [place], [place]        Group Therapy Note    Attendees: 10/22          Patient's Goal:  To increase social interaction and to explore and identify short term goals r/t wellness and recovery. RT discussed group schedule and unit structure/resources and encouraged pts to be engaged in their treatment. Pts were given opportunities to ask questions. Notes: Pt participated in group task and was able to identify short term goals r/t wellness and recovery. Pt is pleasant and supportive of peers. Pt demonstrates staff splitting at end of group. Pt was informed of unit policy to keep non essential items in belongings area. Pt continued to seek access to non essential item from other staff. Status After Intervention:  Improved     Participation Level: Active Listener and Interactive     Participation Quality: Appropriate, Attentive, Sharing         Speech:   Normal     Thought Process/Content: Logical,linear. Pt is mildly disruptive with side conversations, and feeds off peers' complaints/concerns stating \"Oh yeah I want that medication, I'll take a buzz\". Pt is overly focused on peers' sharing their reasons for admission.      Affective Functioning: Congruent     Mood: euthymic, underlying testing limits        Level of consciousness:  Alert, and Attentive        Response to Learning: Able to verbalize current knowledge/experience, Able to verbalize/acknowledge new learning, and Progressing to goal        Endings: None Reported     Modes of Intervention: Education, Support, Socialization, Exploration, Clarifying and Problem-solving        Discipline Responsible: Psychoeducational Specialist        Signature:  Emily Yu

## 2021-04-05 NOTE — GROUP NOTE
Group Therapy Note    Date: 4/5/2021    Group Start Time: 1100  Group End Time: 1130  Group Topic: Psychotherapy    [facility]    Toyin Carrasquillo        Group Therapy Note    Attendees: 7/12         Patient's Goal:  PT will demonstrate increased interpersonal interaction and a clear understanding on multiple types of coping skills relating to the here-and-now therapeutic practice. Notes:  Patient is making progress, AEB participating in group discussion, actively listening, and supporting other group members. PT participates in group and encourages others to participate     Status After Intervention:  Improved    Participation Level: Active Listener and Interactive    Participation Quality: Appropriate, Attentive, Sharing and Supportive      Speech:  normal      Thought Process/Content: Logical      Affective Functioning: Flat      Mood: depressed      Level of consciousness:  Alert, Oriented x4 and Attentive      Response to Learning: Able to verbalize current knowledge/experience and Progressing to goal      Endings: None Reported    Modes of Intervention: Support, Socialization, Exploration, Clarifying and Problem-solving      Discipline Responsible: /Counselor      Signature:   Toyin Carrasquillo

## 2021-04-05 NOTE — PROGRESS NOTES
BEHAVIORAL HEALTH FOLLOW-UP NOTE     4/5/2021     Patient was seen and examined in person, Chart reviewed   Patient's case discussed with staff/team    Chief Complaint: Depression and alcohol withdrawal    Interim History:   Patient has been compliant with medications, using Atarax as needed for anxiety. Patient came to the desk and asked for Haldol due to agitation earlier this afternoon. He has been attending groups today. He endorses continued depression, rates his depression 8/10 (10 worst) with passive suicidal ideation, he does not have a plan but reports \"I honestly would not care if I never woke up again\". He is able to contract for safety while on the unit. He denies any auditory or visual hallucinations. Currently on Librium 10 mg 3 times a day for alcohol withdrawal.  CIWA done at bedside, patient pins-and-needles, sweating, slight headache, tremor, and sensitivity to light. Score was 9. He reports that Librium 10 mg was working well, it did seem to wear off prior to the next dose. He did not sleep last night, was awoken in the middle of the night and what he describes \"drenched in sweat and shaky\". Staff have not reported any behavioral outbursts, patient continues to have agitation but is able to ask for medications to help with this today. He reports that his appetite is \"okay\" able to tolerate food without nausea. He is still interested in recovery outpatient once discharged. He denies any medication side effects.       Appetite:  [] Normal/Unchanged  [] Increased  [x] Decreased      Sleep:       [] Normal/Unchanged  [] Fair       [x] Poor              Energy:    [x] Normal/Unchanged  [] Increased  [] Decreased        Aggression:  [] yes  [x] no    Patient is [x] able  [] unable to CONTRACT FOR SAFETY ON THE UNIT    PAST MEDICAL/PSYCHIATRIC HISTORY:   Past Medical History:   Diagnosis Date    COPD (chronic obstructive pulmonary disease) ([facility]     Hep B w/ coma     Seizures ([facility] FAMILY/SOCIAL HISTORY:  No family history on file. Social History     Socioeconomic History    Marital status:      Spouse name: Not on file    Number of children: Not on file    Years of education: Not on file    Highest education level: Not on file   Occupational History    Not on file   Social Needs    Financial resource strain: Not on file    Food insecurity     Worry: Not on file     Inability: Not on file    Transportation needs     Medical: Not on file     Non-medical: Not on file   Tobacco Use    Smoking status: Current Every Day Smoker     Packs/day: 1.00     Types: Cigarettes    Smokeless tobacco: Never Used   Substance and Sexual Activity    Alcohol use: Yes     Frequency: Never    Drug use: Yes     Types: Marijuana    Sexual activity: Not on file   Lifestyle    Physical activity     Days per week: Not on file     Minutes per session: Not on file    Stress: Not on file   Relationships    Social connections     Talks on phone: Not on file     Gets together: Not on file     Attends Scientologist service: Not on file     Active member of club or organization: Not on file     Attends meetings of clubs or organizations: Not on file     Relationship status: Not on file    Intimate partner violence     Fear of current or ex partner: Not on file     Emotionally abused: Not on file     Physically abused: Not on file     Forced sexual activity: Not on file   Other Topics Concern    Not on file   Social History Narrative    Not on file           ROS:  [x] All negative/unchanged except if checked.  Explain positive(checked items) below:  [x] Constitutional  [x] Eyes  [] Ear/Nose/Mouth/Throat  [] Respiratory  [] CV  [x] GI  []   [] Musculoskeletal  [] Skin/Breast  [x] Neurological  [] Endocrine  [] Heme/Lymph  [] Allergic/Immunologic    Explanation: Pins-and-needles, sensitivity to light, tremor, headache    MEDICATIONS:    Current Facility-Administered Medications:    aspirin-acetaminophen-caffeine (EXCEDRIN MIGRAINE) per tablet 1 tablet, 1 tablet, Oral, Q6H PRN, Tamiko Harder, APRN - CNP    chlordiazePOXIDE (LIBRIUM) capsule 10 mg, 10 mg, Oral, 4x Daily, Tamiko Harder, APRN - CNP    chlordiazePOXIDE (LIBRIUM) capsule 10 mg, 10 mg, Oral, TID, Tamiko Harder, APRN - CNP, 10 mg at 04/05/21 1400    albuterol sulfate  (90 Base) MCG/ACT inhaler 2 puff, 2 puff, Inhalation, Q6H PRN, Alyssa Oas, APRN - CNP, 2 puff at 04/05/21 0631    amitriptyline (ELAVIL) tablet 50 mg, 50 mg, Oral, Nightly, Alyssa Oas, APRN - CNP, 50 mg at 04/04/21 2032    busPIRone (BUSPAR) tablet 15 mg, 15 mg, Oral, TID, Alyssa Oas, APRN - CNP, 15 mg at 04/05/21 1400    fluticasone (FLONASE) 50 MCG/ACT nasal spray 1 spray, 1 spray, Each Nostril, Daily, Alyssa Oas, APRN - CNP, 1 spray at 04/05/21 8007    gabapentin (NEURONTIN) capsule 800 mg, 800 mg, Oral, TID, Alyssa Oas, APRN - CNP, 800 mg at 04/05/21 1400    ipratropium (ATROVENT HFA) 17 MCG/ACT inhaler 2 puff, 2 puff, Inhalation, Q4H PRN, Alyssa Oas, APRN - CNP, 2 puff at 04/05/21 0395    lamoTRIgine (LAMICTAL) tablet 100 mg, 100 mg, Oral, BID, Alyssa Oas, APRN - CNP, 100 mg at 04/05/21 3697    levETIRAcetam (KEPPRA) tablet 500 mg, 500 mg, Oral, BID, Alyssa Oas, APRN - CNP, 500 mg at 04/05/21 0815    magnesium oxide (MAG-OX) tablet 400 mg, 400 mg, Oral, Daily, Alyssa Oas, APRN - CNP, 400 mg at 04/05/21 0813    naproxen (NAPROSYN) tablet 500 mg, 500 mg, Oral, BID WC, Alyssa Oas, APRN - CNP, 500 mg at 04/05/21 0816    predniSONE (DELTASONE) tablet 10 mg, 10 mg, Oral, Daily, Alyssa Oas, APRN - CNP, 10 mg at 04/05/21 0817    [START ON 4/8/2021] predniSONE (DELTASONE) tablet 5 mg, 5 mg, Oral, Daily, Alyssa Oas, APRN - CNP    propranolol (INDERAL) tablet 80 mg, 80 mg, Oral, Daily, Alyssa Oas, APRN - CNP, 80 mg at 04/05/21 0815    QUEtiapine (SEROQUEL XR) Denies, none observed or reported by staff  Perceptual Disturbance: Denies auditory, visual or tactile hallucinations  Cognition: Oriented to person, location general circumstance  Concentration distractible  Insight poor  Judgement poor    ASSESSMENT:   Patient symptoms are:  [] Well controlled  [x] Improving  [] Worsening  [] No change      Diagnosis:  Principal Problem:    MDD (major depressive disorder), recurrent severe, without psychosis ([facility] [address]  Active Problems:    Depression with suicidal ideation    Alcohol withdrawal ([facility]  Resolved Problems:    * No resolved hospital problems. *      LABS:    No results for input(s): WBC, HGB, PLT in the last 72 hours. No results for input(s): NA, K, CL, CO2, BUN, CREATININE, GLUCOSE in the last 72 hours. No results for input(s): BILITOT, ALKPHOS, AST, ALT in the last 72 hours. Lab Results   Component Value Date    BARBSCNU NEGATIVE 09/06/2020    LABBENZ NEGATIVE 09/06/2020    LABMETH NEGATIVE 09/06/2020    PPXUR NOT REPORTED 09/06/2020     No results found for: TSH, FREET4  No results found for: LITHIUM  Lab Results   Component Value Date    VALPROATE 60 09/06/2020       Treatment Plan:  Reviewed current Medications with the patient. Continue Librium 10 mg increase to 4 times a day for 4 doses, reassess tomorrow for continued need for alcohol withdrawal    Excedrin migraine for headaches as needed every 6 hours    Social work to assist patient in recovery services after discharge    Risks, benefits, side effects, drug-to-drug interactions and alternatives to treatment were discussed. The patient  consented to treatment. Encourage patient to attend group and other milieu activities.   Discharge planning discussed with the patient and treatment team.  Daily while inpatient    PSYCHOTHERAPY/COUNSELING:  [] Therapeutic interview  [x] Supportive  [] CBT  [] Ongoing  [] Other    [x] Patient continues to need, on a daily basis, active treatment furnished directly by or requiring the supervision of inpatient psychiatric personnel      Anticipated Length of stay:Per attending psychiatric physician                                         Larry Basilio is a 50 y.o. male being evaluated face to face. --THERESA Norman - CNP on 4/5/2021 at 3:08 PM    An electronic signature was used to authenticate this note. **This report has been created using voice recognition software. It may contain minor errors which are inherent in voice recognition technology. **    I independently saw and evaluated the patient. I reviewed the nurse practitioners documentation above. Any additional comments or changes to the nurse practitioners documentation are stated below otherwise agree with assessment. Plan will be as follows:  Spent 30 minutes with the patient. Of that greater than 16 minutes was spent in supportive psychotherapy. Discussed patient's irritability. Discussed possible consult to neurology for titration off of Keppra onto another appropriate antipsychotic but given his potential liver disease will ask neurology for input. Patient is in agreement. PLAN  Patient s symptoms   are improving  Consult neurology for recommendations to transition off [address] on irritable patient with seizure history  Attempt to develop insight  Psycho-education conducted. Supportive Therapy conducted.   Probable discharge is 2 to 3 days pending stability  Follow-up daily while on inpatient unit

## 2021-04-05 NOTE — FLOWSHEET NOTE
*Patient participated in the Spirituality Group       04/05/21 1401   Encounter Summary   Services provided to: Patient   Referral/Consult From: Rounding   Continue Visiting   (4/5/21)   Complexity of Encounter Moderate   Length of Encounter 30 minutes   Spiritual/Taoist   Type Spiritual support   Assessment Calm; Approachable   Intervention Active listening   Outcome Receptive;Engaged in conversation;Expressed gratitude

## 2021-04-05 NOTE — PLAN OF CARE
Problem: Depressive Behavior With or Without Suicide Precautions:  Goal: Ability to disclose and discuss suicidal ideas will improve  Description: Ability to disclose and discuss suicidal ideas will improve  4/5/2021 1639 by Vick Mendez RN  Outcome: Ongoing  Note: Patient denies thoughts of harm to self or others at this time. He states that he will try to control his temper towards others today. He states he does not want another day like yesterday. Patient is eating and sleeping well. Patient showered today. Patient admits to depression and anxiety symptoms. Visual safety checks are completed every fifteen minutes and randomly. Patient is compliant with medications. 4/5/2021 0310 by Gem Finnegan LPN  Outcome: Ongoing  Note: Patient denies suicide ideations at this time,educated about jon for safety if feelings of suicide begin. Patient safety check every 15 minute     Problem: Tobacco Use:  Goal: Inpatient tobacco use cessation counseling participation  Description: Inpatient tobacco use cessation counseling participation  4/5/2021 1639 by Vick Mendez RN  Outcome: Ongoing  Note: Patient declined counseling at this time. 4/5/2021 0310 by Gem Finnegan LPN  Outcome: Ongoing  Note: Patient given tobacco quitline number 64121811169 at this time, refusing to call at this time, states \" I just dont want to quit now\"- patient given information as to the dangers of long term tobacco use. Continue to reinforce the importance of tobacco cessation.

## 2021-04-05 NOTE — GROUP NOTE
Group Therapy Note    Date: 4/5/2021    Group Start Time: 1000  Group End Time: 8607  Group Topic: Cognitive Skills    KIA Mccormack        Group Therapy Note    Attendees: 7/11         Patient's Goal:  To increase social interaction and to practice problem solving, listening and communication skills. Notes: Pt participated fully in group task . Pt was able to practice problem solving, listening and communication skills. Pt was pleasant and supportive of peers . However, pt is evasive and tries to distract peers et staff spilt r/t getting an item from his belongings rather than persist with task that is \"not easy, I don't want to work my brain\". Pt is polite but tests limits        Status After Intervention:  Unchanged     Participation Level:  Attentive, Interactive, alert     Participation Quality:  Attentive, Sharing , distracts peers     Speech:  Normal     Thought Process/Content: Logical,evasive     Affective Functioning: Congruent      Mood:  Euthymic but subtly tests limits     Level of consciousness:  Alert, Oriented x4 and Attentive        Response to Learning: Able to verbalize current knowledge/experience, Able to acknowledge new learning ,and Progressing to goal        Endings: None Reported     Modes of Intervention: Education, Support, Socialization, Exploration, Clarifying and Problem-solving        Discipline Responsible: Psychoeducational Specialist        Signature:  [place], CTRS

## 2021-04-06 PROCEDURE — 6370000000 HC RX 637 (ALT 250 FOR IP): Performed by: PSYCHIATRY & NEUROLOGY

## 2021-04-06 PROCEDURE — 99232 SBSQ HOSP IP/OBS MODERATE 35: CPT | Performed by: PSYCHIATRY & NEUROLOGY

## 2021-04-06 PROCEDURE — 1240000000 HC EMOTIONAL WELLNESS R&B

## 2021-04-06 PROCEDURE — 6370000000 HC RX 637 (ALT 250 FOR IP): Performed by: NURSE PRACTITIONER

## 2021-04-06 PROCEDURE — 90833 PSYTX W PT W E/M 30 MIN: CPT | Performed by: PSYCHIATRY & NEUROLOGY

## 2021-04-06 RX ORDER — QUETIAPINE 400 MG/1
400 TABLET, FILM COATED, EXTENDED RELEASE ORAL NIGHTLY
Status: DISCONTINUED | OUTPATIENT
Start: 2021-04-06 | End: 2021-04-12 | Stop reason: HOSPADM

## 2021-04-06 RX ORDER — QUETIAPINE FUMARATE 25 MG/1
25 TABLET, FILM COATED ORAL 3 TIMES DAILY PRN
Status: DISCONTINUED | OUTPATIENT
Start: 2021-04-06 | End: 2021-04-06

## 2021-04-06 RX ORDER — QUETIAPINE FUMARATE 200 MG/1
200 TABLET, FILM COATED ORAL NIGHTLY
Status: DISCONTINUED | OUTPATIENT
Start: 2021-04-06 | End: 2021-04-06

## 2021-04-06 RX ORDER — CHLORDIAZEPOXIDE HYDROCHLORIDE 5 MG/1
5 CAPSULE, GELATIN COATED ORAL EVERY 6 HOURS
Status: DISCONTINUED | OUTPATIENT
Start: 2021-04-07 | End: 2021-04-06

## 2021-04-06 RX ADMIN — AMITRIPTYLINE HYDROCHLORIDE 50 MG: 25 TABLET, FILM COATED ORAL at 21:21

## 2021-04-06 RX ADMIN — THIAMINE HCL TAB 100 MG 100 MG: 100 TAB at 08:28

## 2021-04-06 RX ADMIN — QUETIAPINE FUMARATE 25 MG: 25 TABLET ORAL at 12:02

## 2021-04-06 RX ADMIN — Medication 400 MG: at 08:28

## 2021-04-06 RX ADMIN — NICOTINE POLACRILEX 2 MG: 2 GUM, CHEWING BUCCAL at 11:02

## 2021-04-06 RX ADMIN — NAPROXEN 500 MG: 500 TABLET ORAL at 16:48

## 2021-04-06 RX ADMIN — GABAPENTIN 800 MG: 400 CAPSULE ORAL at 14:18

## 2021-04-06 RX ADMIN — LAMOTRIGINE 100 MG: 100 TABLET ORAL at 21:21

## 2021-04-06 RX ADMIN — GABAPENTIN 800 MG: 400 CAPSULE ORAL at 08:29

## 2021-04-06 RX ADMIN — PREDNISONE 10 MG: 5 TABLET ORAL at 08:28

## 2021-04-06 RX ADMIN — NICOTINE POLACRILEX 2 MG: 2 GUM, CHEWING BUCCAL at 09:34

## 2021-04-06 RX ADMIN — HYDROXYZINE HYDROCHLORIDE 50 MG: 50 TABLET, FILM COATED ORAL at 21:21

## 2021-04-06 RX ADMIN — NICOTINE POLACRILEX 2 MG: 2 GUM, CHEWING BUCCAL at 21:21

## 2021-04-06 RX ADMIN — BUSPIRONE HYDROCHLORIDE 15 MG: 15 TABLET ORAL at 08:28

## 2021-04-06 RX ADMIN — QUETIAPINE FUMARATE 400 MG: 400 TABLET, EXTENDED RELEASE ORAL at 21:21

## 2021-04-06 RX ADMIN — MULTIPLE VITAMINS W/ MINERALS TAB 1 TABLET: TAB at 08:27

## 2021-04-06 RX ADMIN — CHLORDIAZEPOXIDE HYDROCHLORIDE 10 MG: 10 CAPSULE ORAL at 16:48

## 2021-04-06 RX ADMIN — LEVETIRACETAM 500 MG: 500 TABLET ORAL at 21:21

## 2021-04-06 RX ADMIN — HYDROXYZINE HYDROCHLORIDE 50 MG: 50 TABLET, FILM COATED ORAL at 06:10

## 2021-04-06 RX ADMIN — CHLORDIAZEPOXIDE HYDROCHLORIDE 10 MG: 10 CAPSULE ORAL at 14:18

## 2021-04-06 RX ADMIN — LEVETIRACETAM 500 MG: 500 TABLET ORAL at 08:28

## 2021-04-06 RX ADMIN — NICOTINE POLACRILEX 2 MG: 2 GUM, CHEWING BUCCAL at 06:47

## 2021-04-06 RX ADMIN — LAMOTRIGINE 100 MG: 100 TABLET ORAL at 08:28

## 2021-04-06 RX ADMIN — FLUTICASONE PROPIONATE 1 SPRAY: 50 SPRAY, METERED NASAL at 08:27

## 2021-04-06 RX ADMIN — ALBUTEROL SULFATE 2 PUFF: 90 AEROSOL, METERED RESPIRATORY (INHALATION) at 06:10

## 2021-04-06 RX ADMIN — GABAPENTIN 800 MG: 400 CAPSULE ORAL at 21:21

## 2021-04-06 RX ADMIN — BUSPIRONE HYDROCHLORIDE 15 MG: 15 TABLET ORAL at 21:21

## 2021-04-06 RX ADMIN — PROPRANOLOL HYDROCHLORIDE 80 MG: 20 TABLET ORAL at 08:27

## 2021-04-06 RX ADMIN — BUSPIRONE HYDROCHLORIDE 15 MG: 15 TABLET ORAL at 14:18

## 2021-04-06 RX ADMIN — NAPROXEN 500 MG: 500 TABLET ORAL at 08:29

## 2021-04-06 RX ADMIN — HYDROXYZINE HYDROCHLORIDE 50 MG: 50 TABLET, FILM COATED ORAL at 14:18

## 2021-04-06 RX ADMIN — QUETIAPINE FUMARATE 25 MG: 25 TABLET ORAL at 16:44

## 2021-04-06 RX ADMIN — CHLORDIAZEPOXIDE HYDROCHLORIDE 10 MG: 10 CAPSULE ORAL at 08:28

## 2021-04-06 ASSESSMENT — PAIN SCALES - GENERAL
PAINLEVEL_OUTOF10: 8
PAINLEVEL_OUTOF10: 3

## 2021-04-06 NOTE — PLAN OF CARE
Problem: Depressive Behavior With or Without Suicide Precautions:  Goal: Ability to disclose and discuss suicidal ideas will improve  Description: Ability to disclose and discuss suicidal ideas will improve  4/6/2021 0955 by Keshav South LPN  Outcome: Ongoing  Note: Patient has been free of self harm and denies thoughts of harming self at this time. Patient has agreed to seek staff if he begins having thoughts of harming self or needs to talk. Q15 minute safety checks continue.       Problem: Tobacco Use:  Goal: Inpatient tobacco use cessation counseling participation  Description: Inpatient tobacco use cessation counseling participation  4/6/2021 0955 by Keshav South LPN  Outcome: Ongoing

## 2021-04-06 NOTE — GROUP NOTE
Group Therapy Note    Date: 4/6/2021    Group Start Time: 0900  Group End Time: 7772  Group Topic: Community Meeting    KIA Pineda        Group Therapy Note    Attendees: 6         Patient's Goal:  To improve goal setting/ improve decision making skills     Notes:  Pt was quiet and annoyed    Status After Intervention:  Unchanged    Participation Level:  Active Listener   Participation Quality: Appropriate      Speech:  normal      Thought Process/Content: logical      Affective Functioning: flat      Mood depressed       Level of consciousness:  Alert       Response to Learning: Able to verbalize current knowledge/experience and Progressing to goal      Endings: None Reported    Modes of Intervention: Education, Support, Clarifying and Problem-solving      Discipline Responsible: Psychoeducational Specialist      Signature:  Araceli Vázquez

## 2021-04-06 NOTE — GROUP NOTE
Group Therapy Note    Date: 4/6/2021    Group Start Time: 1330  Group End Time: 3664  Group Topic: cognitive skills     C[facility] B[facility] DAKSHA Munson, CTRS        Group Therapy Note    Attendees 7/15         Patient's Goal:  To improve coping skills /improve communication skills     Notes:  Pt was pleasant and participated well     Status After Intervention:  improved    Participation Level:  Active Listener     Participation Quality: Appropriate      Speech:  normal      Thought Process/Content: logical      Affective Functioning: congruent       Mood  euthymic       Level of consciousness:  Alert       Response to Learning: Able to verbalize current knowledge/experience and Progressing to goal      Endings: None Reported    Modes of Intervention: Education, Support, Clarifying and Problem-solving      Discipline Responsible: Psychoeducational Specialist      Signature:  Kamala Lindsey

## 2021-04-06 NOTE — BH NOTE
Date: 4/6/2021     Group Start Time:1600   Group End Time: 1379  Group Topic: Wellness group     [address] [facility] [address]    Charlette Jones LPN           Group Therapy Note     Attendees: 9/16           Patient's Goal:  Wellness group     Notes: Learn new coping skills     Status After Intervention:  Improved     Participation Level:  Active Listener and Interactive     Participation Quality: Appropriate, Attentive and Sharing        Speech:  normal        Thought Process/Content: Logical        Affective Functioning: Congruent        Mood: within normal limits        Level of consciousness:  Alert, Oriented x4 and Attentive        Response to Learning: Able to verbalize current knowledge/experience, Able to verbalize/acknowledge new learning, Able to retain information and Progressing to goal        Endings: None Reported     Modes of Intervention: Education, Support and Socialization        Discipline Responsible: ROSARIO     Signature: Charlette Jones

## 2021-04-06 NOTE — PROGRESS NOTES
BEHAVIORAL HEALTH FOLLOW-UP NOTE     2021     Patient was seen and examined in person, Chart reviewed   Patient's case discussed with staff/team    Chief Complaint: Depression and alcohol withdrawal    Interim History:   Brooklynn King has been compliant with his medications, using Atarax as needed for anxiety and Seroquel once today for agitation. He is currently on a Librium taper, current dose is 10 mg. Staff report that patient intermittently has attended groups, and is more social on the unit. He reports his withdrawal symptoms are improving, still feels shaky with a headache. Reports that he slept a total of 6 hours last night it was interrupted but more restful. His appetite is improving. He continues to have depression, rates his depression today 8/10 (10 worst) with passive suicidal ideation, contracts for safety while in unit. Patient continues to mourn the death of his wife. Today we discussed the anger he has towards his daughters, his wife  while he was in prison. We spent approximately 15 minutes discussing coping strategies and forgiveness. He was very tearful. He continues to express interest in recovery housing after he is discharged. We discussed his current medications, reviewed with him that his Librium will be reduced to 5 mg starting tomorrow morning for 2 doses and then discontinued, patient was agreeable to this plan. We will discuss with attending psychiatric physician tapering patient off of Elavil at night, as for concern that he may attempt to overdose while at home. And transitioning him to Remeron.       Appetite:  [x] Normal/Unchanged  [] Increased  [] Decreased      Sleep:       [] Normal/Unchanged  [x] Fair       [] Poor              Energy:    [x] Normal/Unchanged  [] Increased  [] Decreased        Aggression:  [] yes  [x] no    Patient is [x] able  [] unable to CONTRACT FOR SAFETY ON THE UNIT    PAST MEDICAL/PSYCHIATRIC HISTORY:   Past Medical History:   Diagnosis Date Facility-Administered Medications:     QUEtiapine (SEROQUEL) tablet 25 mg, 25 mg, Oral, TID PRN, Keshia Del Castillo MD, 25 mg at 04/06/21 1202    QUEtiapine (SEROQUEL) tablet 200 mg, 200 mg, Oral, Nightly, Keshia Del Castillo MD    aspirin-acetaminophen-caffeine (EXCEDRIN MIGRAINE) per tablet 1 tablet, 1 tablet, Oral, Q6H PRN, THERESA Lepe CNP    chlordiazePOXIDE (LIBRIUM) capsule 10 mg, 10 mg, Oral, 4x Daily, THERESA Lepe CNP, 10 mg at 04/06/21 1418    albuterol sulfate  (90 Base) MCG/ACT inhaler 2 puff, 2 puff, Inhalation, Q6H PRN, THERESA Engel CNP, 2 puff at 04/06/21 0610    amitriptyline (ELAVIL) tablet 50 mg, 50 mg, Oral, Nightly, THERESA Engel CNP, 50 mg at 04/05/21 2109    busPIRone (BUSPAR) tablet 15 mg, 15 mg, Oral, TID, THERESA Engel CNP, 15 mg at 04/06/21 1418    fluticasone (FLONASE) 50 MCG/ACT nasal spray 1 spray, 1 spray, Each Nostril, Daily, THERESA Engel CNP, 1 spray at 04/06/21 0827    gabapentin (NEURONTIN) capsule 800 mg, 800 mg, Oral, TID, THERESA Engel CNP, 800 mg at 04/06/21 1418    ipratropium (ATROVENT HFA) 17 MCG/ACT inhaler 2 puff, 2 puff, Inhalation, Q4H PRN, THERESA Engel CNP, 2 puff at 04/05/21 2152    lamoTRIgine (LAMICTAL) tablet 100 mg, 100 mg, Oral, BID, THERESA Engel CNP, 100 mg at 04/06/21 4505    levETIRAcetam (KEPPRA) tablet 500 mg, 500 mg, Oral, BID, THERESA Engel CNP, 500 mg at 04/06/21 1490    magnesium oxide (MAG-OX) tablet 400 mg, 400 mg, Oral, Daily, THERESA Engel CNP, 400 mg at 04/06/21 1526    naproxen (NAPROSYN) tablet 500 mg, 500 mg, Oral, BID WC, THERESA Engel CNP, 500 mg at 04/06/21 2177    predniSONE (DELTASONE) tablet 10 mg, 10 mg, Oral, Daily, THERESA Engel CNP, 10 mg at 04/06/21 0828    [START ON 4/8/2021] predniSONE (DELTASONE) tablet 5 mg, 5 mg, Oral, Daily, THERESA Engel CNP propranolol (INDERAL) tablet 80 mg, 80 mg, Oral, Daily, Fausto Purple, APRN - CNP, 80 mg at 04/06/21 0827    haloperidol lactate (HALDOL) injection 5 mg, 5 mg, Intramuscular, Q4H PRN, 5 mg at 04/04/21 0805 **OR** [DISCONTINUED] haloperidol (HALDOL) tablet 5 mg, 5 mg, Oral, Q4H PRN, Frederic Barone MD, 5 mg at 04/05/21 1223    thiamine mononitrate tablet 100 mg, 100 mg, Oral, Daily, Frederic Barone MD, 100 mg at 04/06/21 4983    therapeutic multivitamin-minerals 1 tablet, 1 tablet, Oral, Daily, Frederic Barone MD, 1 tablet at 04/06/21 0827    acetaminophen (TYLENOL) tablet 650 mg, 650 mg, Oral, Q4H PRN, Sofia Melgar MD    aluminum & magnesium hydroxide-simethicone (MAALOX) 200-200-20 MG/5ML suspension 30 mL, 30 mL, Oral, Q6H PRN, Sofia Melgar MD    hydrOXYzine (ATARAX) tablet 50 mg, 50 mg, Oral, TID PRN, Sofia Melgar MD, 50 mg at 04/06/21 1418    nicotine polacrilex (NICORETTE) gum 2 mg, 2 mg, Oral, PRN, Sofia Melgar MD, 2 mg at 04/06/21 1102    polyethylene glycol (GLYCOLAX) packet 17 g, 17 g, Oral, Daily PRN, Sofia Melgar MD      Examination:  /79   Pulse 87   Temp 98 °F (36.7 °C)   Resp 14   Ht 5' 9\" (1.753 m)   Wt 169 lb (76.7 kg)   BMI 24.96 kg/m²   Gait - steady   Medication side effects(SE): Denies    Mental Status Examination:    Level of consciousness: Awake and alert  Appearance: Casually dressed with fair grooming and hygiene  Behavior/Motor: Tearful bilateral tremor to upper extremities with arms extended, cannot be seen but can be felt  Attitude toward examiner: cooperative, engaged in interview with good eye contact  Mood: Depressed  Affect: Mood congruent  Thought processes: Circumstantial  Thought content:  Homicidal ideation - none, patient reports feeling agitated, but no threats to staff or peers  Suicidal Ideation: Passive, contracts for safety  Delusions: Denies, none observed or reported by staff  Perceptual Disturbance: psychiatric physician                                         Diya Baumann is a 50 y.o. male being evaluated face to face. --Felicia Enamorado, APRN - CNP on 4/6/2021 at 4:12 PM    An electronic signature was used to authenticate this note. **This report has been created using voice recognition software. It may contain minor errors which are inherent in voice recognition technology. **        I independently saw and evaluated the patient. I reviewed the nurse practitioners documentation above. Any additional comments or changes to the nurse practitioners documentation are stated below otherwise agree with assessment. Plan will be as follows:  Spent 30 minutes with the patient, of that greater than 16 minutes was spent in supportive psychotherapy. Patient reportedly snorting medications on the unit. This was after our discussion of changing some of his as needed medications to instant release Seroquel. We will convert back to extended release Seroquel. May consider further adjustment of at bedtime sleeping medications pending observation  PLAN  Patient s symptoms   showing some improvement though abuse of medications evident  Continue extended release Seroquel at bedtime  Attempt to develop insight  Psycho-education conducted. Supportive Therapy conducted.   Probable discharge is undetermined at this time  Follow-up daily while on inpatient unit

## 2021-04-06 NOTE — GROUP NOTE
Group Therapy Note    Date: 4/6/2021    Group Start Time: 1000  Group End Time: 1529  Group Topic: Recreational    [facility]    Brennan Schroeder        Group Therapy Note    Attendees: 5/10         Patient's Goal:  To increase interpersonal interaction    Notes:      Status After Intervention:  Improved    Participation Level:  Active Listener and Interactive    Participation Quality: Appropriate, Attentive and Sharing      Speech:  normal      Thought Process/Content: Logical  Linear      Affective Functioning: Congruent      Mood: euthymic      Level of consciousness:  Alert, Oriented x4 and Attentive      Response to Learning: Able to verbalize current knowledge/experience, Able to verbalize/acknowledge new learning, Able to retain information and Progressing to goal      Endings: None Reported    Modes of Intervention: Education, Support, Socialization, Exploration, Clarifying, Problem-solving and Activity      Discipline Responsible: Psychoeducational Specialist      Signature:  Brennan Schroeder

## 2021-04-06 NOTE — CARE COORDINATION
Writer attempted to meet with patient for 1:1, patient was in group at this time. Writer will attempt at a later time.

## 2021-04-06 NOTE — PLAN OF CARE
Problem: Depressive Behavior With or Without Suicide Precautions:  Goal: Ability to disclose and discuss suicidal ideas will improve  Description: Ability to disclose and discuss suicidal ideas will improve  4/5/2021 2303 by Azul Urrutia RN  Outcome: Ongoing  Patient denied suicidal ideations. Patient denied having thoughts of wanting to harm self. Patient observed every 15 minutes and as needed for safety and environmental factors. Will continue to monitor closely. Problem: Tobacco Use:  Goal: Inpatient tobacco use cessation counseling participation  Description: Inpatient tobacco use cessation counseling participation  4/5/2021 2303 by Azul Urrutia RN  Outcome: Ongoing  Patient refused tobacco use cessation counseling.

## 2021-04-06 NOTE — GROUP NOTE
Group Therapy Note    Date: 4/6/2021    Group Start Time: 1430  Group End Time: 1500  Group Topic: Psychoeducation    MARY SCHILLINGI DAKSHA    [facility]    Patient refused to attend mental health education/ coping skills group at 1430 after encouragement from staff. 1:1 talk time provided by staff.      Signature:  [facility]

## 2021-04-06 NOTE — BH NOTE
Mr. Bird Urias received a PRN Seroquel at 4:44 pm, he received his scheduled Naprosyn 500 mg and Librium 10 mg at 4:48 pm. After dinner, Mr. Ailyn Aiken was observed walking to his room with a staw that he was observed retrieving from a peers room. Writer and staff member walked down to Mr. Forman room. Upon approaching room, audible sniffing sound was heard. Upon opening the door, Mr. Bird Urias was observed bent over the desk at his bed, and standing up. He had the straw and his comb in hand. Writer observed Mr. Bird Urias wiping the desk with his hand, and sniffing 2-3 more times before turning around. Writer asked what he was doing to which he replied \"Just cleaning up\". Writer walked over and observed fine white powder on the desk where he was bent over. Writer asked if he was snorting his meds, he replied \"No\". Writer notified Charge nurse. MD was notified.

## 2021-04-06 NOTE — GROUP NOTE
Group Therapy Note    Date: 4/6/2021    Group Start Time: 1100  Group End Time: 1130  Group Topic: Psychotherapy    [facility]    Cottagevilleyfn Zaragoza        Group Therapy Note    Attendees:6/10         Patient's Goal:  PT will demonstrate increased interpersonal interaction and a clear understanding on multiple types of coping skills relating to the here-and-now therapeutic practice. Notes: Patient is making progress, AEB participating in group discussion, actively listening, and supporting other group members. PT participates in group and encourages others to participate     Status After Intervention:  Improved    Participation Level: Active Listener and Interactive    Participation Quality: Appropriate, Attentive and Sharing      Speech:  normal      Thought Process/Content: Logical      Affective Functioning: Flat      Mood: depressed      Level of consciousness:  Alert, Oriented x4 and Attentive      Response to Learning: Able to verbalize current knowledge/experience and Progressing to goal      Endings: None Reported    Modes of Intervention: Support, Socialization, Exploration, Clarifying and Problem-solving      Discipline Responsible: /Counselor      Signature:   Dolly Zaragoza

## 2021-04-07 PROCEDURE — 99232 SBSQ HOSP IP/OBS MODERATE 35: CPT | Performed by: PSYCHIATRY & NEUROLOGY

## 2021-04-07 PROCEDURE — 6370000000 HC RX 637 (ALT 250 FOR IP): Performed by: PSYCHIATRY & NEUROLOGY

## 2021-04-07 PROCEDURE — 1240000000 HC EMOTIONAL WELLNESS R&B

## 2021-04-07 PROCEDURE — 6370000000 HC RX 637 (ALT 250 FOR IP): Performed by: NURSE PRACTITIONER

## 2021-04-07 RX ORDER — MIRTAZAPINE 15 MG/1
7.5 TABLET, FILM COATED ORAL NIGHTLY
Status: DISCONTINUED | OUTPATIENT
Start: 2021-04-07 | End: 2021-04-12 | Stop reason: HOSPADM

## 2021-04-07 RX ORDER — LANOLIN ALCOHOL/MO/W.PET/CERES
1.5 CREAM (GRAM) TOPICAL NIGHTLY
Status: DISCONTINUED | OUTPATIENT
Start: 2021-04-07 | End: 2021-04-12 | Stop reason: HOSPADM

## 2021-04-07 RX ORDER — MIRTAZAPINE 15 MG/1
15 TABLET, FILM COATED ORAL NIGHTLY
Status: DISCONTINUED | OUTPATIENT
Start: 2021-04-07 | End: 2021-04-07

## 2021-04-07 RX ADMIN — Medication 400 MG: at 08:31

## 2021-04-07 RX ADMIN — BUSPIRONE HYDROCHLORIDE 15 MG: 15 TABLET ORAL at 14:09

## 2021-04-07 RX ADMIN — PREDNISONE 10 MG: 5 TABLET ORAL at 08:36

## 2021-04-07 RX ADMIN — ACETAMINOPHEN, ASPIRIN AND CAFFEINE 1 TABLET: 250; 250; 65 TABLET, FILM COATED ORAL at 09:10

## 2021-04-07 RX ADMIN — LAMOTRIGINE 100 MG: 100 TABLET ORAL at 20:45

## 2021-04-07 RX ADMIN — BUSPIRONE HYDROCHLORIDE 15 MG: 15 TABLET ORAL at 20:45

## 2021-04-07 RX ADMIN — NICOTINE POLACRILEX 2 MG: 2 GUM, CHEWING BUCCAL at 08:35

## 2021-04-07 RX ADMIN — GABAPENTIN 800 MG: 400 CAPSULE ORAL at 08:31

## 2021-04-07 RX ADMIN — THIAMINE HCL TAB 100 MG 100 MG: 100 TAB at 08:35

## 2021-04-07 RX ADMIN — NAPROXEN 500 MG: 500 TABLET ORAL at 17:35

## 2021-04-07 RX ADMIN — ALBUTEROL SULFATE 2 PUFF: 90 AEROSOL, METERED RESPIRATORY (INHALATION) at 08:31

## 2021-04-07 RX ADMIN — HYDROXYZINE HYDROCHLORIDE 50 MG: 50 TABLET, FILM COATED ORAL at 09:10

## 2021-04-07 RX ADMIN — QUETIAPINE FUMARATE 400 MG: 400 TABLET, EXTENDED RELEASE ORAL at 20:45

## 2021-04-07 RX ADMIN — PROPRANOLOL HYDROCHLORIDE 80 MG: 20 TABLET ORAL at 08:32

## 2021-04-07 RX ADMIN — HYDROXYZINE HYDROCHLORIDE 50 MG: 50 TABLET, FILM COATED ORAL at 02:47

## 2021-04-07 RX ADMIN — HYDROXYZINE HYDROCHLORIDE 50 MG: 50 TABLET, FILM COATED ORAL at 17:37

## 2021-04-07 RX ADMIN — LAMOTRIGINE 100 MG: 100 TABLET ORAL at 08:35

## 2021-04-07 RX ADMIN — Medication 1.5 MG: at 20:45

## 2021-04-07 RX ADMIN — LEVETIRACETAM 500 MG: 500 TABLET ORAL at 20:45

## 2021-04-07 RX ADMIN — MULTIPLE VITAMINS W/ MINERALS TAB 1 TABLET: TAB at 08:35

## 2021-04-07 RX ADMIN — GABAPENTIN 800 MG: 400 CAPSULE ORAL at 20:45

## 2021-04-07 RX ADMIN — GABAPENTIN 800 MG: 400 CAPSULE ORAL at 14:09

## 2021-04-07 RX ADMIN — LEVETIRACETAM 500 MG: 500 TABLET ORAL at 08:35

## 2021-04-07 RX ADMIN — FLUTICASONE PROPIONATE 1 SPRAY: 50 SPRAY, METERED NASAL at 08:35

## 2021-04-07 RX ADMIN — NAPROXEN 500 MG: 500 TABLET ORAL at 08:36

## 2021-04-07 RX ADMIN — NICOTINE POLACRILEX 2 MG: 2 GUM, CHEWING BUCCAL at 19:45

## 2021-04-07 RX ADMIN — MIRTAZAPINE 7.5 MG: 15 TABLET, FILM COATED ORAL at 20:45

## 2021-04-07 RX ADMIN — BUSPIRONE HYDROCHLORIDE 15 MG: 15 TABLET ORAL at 08:35

## 2021-04-07 ASSESSMENT — PAIN SCALES - GENERAL
PAINLEVEL_OUTOF10: 8
PAINLEVEL_OUTOF10: 8
PAINLEVEL_OUTOF10: 7

## 2021-04-07 NOTE — GROUP NOTE
Group Therapy Note    Date: 4/7/2021    Group Start Time: 1000  Group End Time: 4147  Group Topic: Cognitive Skills    KIA Babcock        Group Therapy Note    Attendees: 5/7         Patient's Goal:  To increase social interaction and to practice problem solving, and communication skills. Notes: Pt participated partially in group task but is very social and makes jokes with peers . Pt was able to practice problem solving, and communication skills. Pt was pleasant and supportive of peers . Status After Intervention: Improved     Participation Level:  Attentive, Interactive, alert     Participation Quality:  Attentive, Sharing , interactive     Speech:  Normal     Thought Process/Content: Logical, partial attention to task due to social conversations     Affective Functioning: Congruent      Mood:  Euthymic      Level of consciousness:  Alert, Oriented x4 and Attentive        Response to Learning: Able to verbalize current knowledge/experience, Able to acknowledge new learning ,and Progressing to goal        Endings: None Reported     Modes of Intervention: Education, Support, Socialization, Exploration, Clarifying and Problem-solving        Discipline Responsible: Psychoeducational Specialist        Signature:  KIA Qiu

## 2021-04-07 NOTE — CARE COORDINATION
Counselor met with patient for one on one to check status of inpatient exploration and patient reported his psychiatrist informed him he had a place for him to go due to his health issue of his seizures. Counselor provided list of inpatient and suggested patient call the list in his AoD folder that the  provided to him yesterday. Patient reported he will attempt to call [facility] [place] as with [address] was provided also.

## 2021-04-07 NOTE — GROUP NOTE
Group Therapy Note    Date: 4/7/2021    Group Start Time: 0900  Group End Time: 0940  Group Topic: Community Meeting    Susan Sawant        Group Therapy Note    Attendees: 10/18         Patient's Goal:  To increase social interaction and to explore and identify short term goals r/t wellness and recovery. RT discussed group schedule and unit structure/resources and encouraged pts to be engaged in their treatment. Pts were given opportunities to ask questions. Notes: Pt participated in group task and was able to identify short term goals r/t wellness and recovery. Pt is pleasant and supportive of peers        Status After Intervention:  Improved     Participation Level:  Active Listener and Interactive     Participation Quality: Appropriate, Attentive, Sharing         Speech:   Normal     Thought Process/Content: Logical,linear     Affective Functioning: Congruent     Mood: euthymic        Level of consciousness:  Alert, and Attentive        Response to Learning: Able to verbalize current knowledge/experience, Able to verbalize/acknowledge new learning, and Progressing to goal        Endings: None Reported     Modes of Intervention: Education, Support, Socialization, Exploration, Clarifying and Problem-solving        Discipline Responsible: Psychoeducational Specialist        Signature:  Tomasz Kay

## 2021-04-07 NOTE — CARE COORDINATION
SOCIAL SERVICE NOTE: SW received message from [address] at [place] [address] reporting that she received a call from PT and was going to contact the [facility] regarding housing information. She requested that SW email her with any discharge information on PT. NABIL sent the following e-mail and also coordinated with Dr. Rubén Jeronimo staff regarding this on this date. I received your message regarding Oj Burr regarding the [facility]. He does not have a specific discharge date as of right now. I will continue to keep you updated on a possible discharge date. Thank you for offering to contact the [address] [place] [address] regarding housing for him. Let us know if you need anything from us. Thanks [facility]   [phone]   [email or web address]

## 2021-04-07 NOTE — PLAN OF CARE
Problem: Depressive Behavior With or Without Suicide Precautions:  Goal: Ability to disclose and discuss suicidal ideas will improve  Description: Ability to disclose and discuss suicidal ideas will improve  4/6/2021 2203 by Jasmeet Martin LPN  Outcome: Ongoing   Patient denies suicidal ideas at this time. Patient denies homicidal ideas at this time. Patient denies depressive symptoms at this time. Patient has been in room. Patient is free of self harm at this time. Patient agrees to seek out staff if thoughts to harm self arise. Staff will provide support and reassurance as needed. Safety checks maintained every 15 minutes.

## 2021-04-07 NOTE — PROGRESS NOTES
Pharmacy Med Education Group Note    Date: 04/07/2021  Start Time: 8372  End Time: 1681    Number Participants in Group:  10    Goal:  Patient will demonstrate an understanding of the medications intended purpose and possible adverse effects  Topic: [place] for Pharmacy Med Ed Group    Discipline Responsible:     OT  AT  [facility].  RT     X Other       Participation Level:     None  Minimal      X Active Listener    X Interactive    Monopolizing         Participation Quality:    X Appropriate  Inappropriate     X       Attentive        Intrusive          Sharing        Resistant          Supportive        Lethargic       Affective:     X Congruent  Incongruent  Blunted  Flat    Constricted  Anxious  Elated  Angry    Labile  Depressed  Other         Cognitive:    X Alert  Oriented PPTP     Concentration   X G  F  P   Attention Span   X G  F  P   Short-Term Memory   X G  F  P   Long-Term Memory  G  F  P   ProblemSolving/  Decision Making  G  F  P   Ability to Process  Information   X G  F  P      Contributing Factors             Delusional             Hallucinating             Flight of Ideas             Other:       Modes of Intervention:    X Education   X Support  Exploration    Clarifying  Problem Solving  Confrontation    Socialization  Limit Setting  Reality Testing    Activity  Movement  Media    Other:            Response to Learning:    X Able to verbalize current knowledge/experience    Able to verbalize/acknowledge new learning    Able to retain information    Capable of insight    Able to change behavior    Progressing to goal    Other:        Comments: Patient was very interactive and asked good questions.   Omid Negrete  Pharm D candidate 2021

## 2021-04-07 NOTE — PROGRESS NOTES
BEHAVIORAL HEALTH FOLLOW-UP NOTE     4/7/2021     Patient was seen and examined in person, Chart reviewed   Patient's case discussed with staff/team    Chief Complaint: Depression and alcohol withdrawal    Interim History:   Patient has been compliant with medications, using Atarax as needed for anxiety. Patient has been attending groups and observed interacting with peers. Staff report that last night patient crushed his Librium and Seroquel IR and took it nasally. Attending psychiatric physician was notified and discontinued these medications. Discussed with patient today, he reports that he was feeling sad and wanted the medication to work faster. After discussion he admits that it was a self sabotaging attempt, patient feels as though he is not worthy of happiness. Discussed with him grief coping as it relates to losing his wife. He was told that medication is to be taken as prescribed. He continues to endorse depression, rates his depression 7/10 (10 worst) with passive suicidal ideation, contracts for safety while in unit. He denies any alcohol withdrawal symptoms today. Notified this writer during interview that there is an inpatient rehab that has accepted him, but he could not remember the name. He was goal oriented and forward thinking looking forward to engaging in alcohol treatment in the future. He reports that he slept poorly last night, it was interrupted getting a total of 4 to 5 hours of sleep. Appetite is improved.   He denies any medication side effects      Appetite:  [x] Normal/Unchanged  [] Increased  [] Decreased      Sleep:       [] Normal/Unchanged  [] Fair       [x] Poor              Energy:    [x] Normal/Unchanged  [] Increased  [] Decreased        Aggression:  [] yes  [x] no    Patient is [x] able  [] unable to CONTRACT FOR SAFETY ON THE UNIT    PAST MEDICAL/PSYCHIATRIC HISTORY:   Past Medical History:   Diagnosis Date    COPD (chronic obstructive pulmonary disease) ([facility] [place] [address]  Hep B w/ coma     Seizures (HCC)        FAMILY/SOCIAL HISTORY:  No family history on file. Social History     Socioeconomic History    Marital status:      Spouse name: Not on file    Number of children: Not on file    Years of education: Not on file    Highest education level: Not on file   Occupational History    Not on file   Social Needs    Financial resource strain: Not on file    Food insecurity     Worry: Not on file     Inability: Not on file    Transportation needs     Medical: Not on file     Non-medical: Not on file   Tobacco Use    Smoking status: Current Every Day Smoker     Packs/day: 1.00     Types: Cigarettes    Smokeless tobacco: Never Used   Substance and Sexual Activity    Alcohol use: Yes     Frequency: Never    Drug use: Yes     Types: Marijuana    Sexual activity: Not on file   Lifestyle    Physical activity     Days per week: Not on file     Minutes per session: Not on file    Stress: Not on file   Relationships    Social connections     Talks on phone: Not on file     Gets together: Not on file     Attends Cheondo[employer] service: Not on file     Active member of club or organization: Not on file     Attends meetings of clubs or organizations: Not on file     Relationship status: Not on file    Intimate partner violence     Fear of current or ex partner: Not on file     Emotionally abused: Not on file     Physically abused: Not on file     Forced sexual activity: Not on file   Other Topics Concern    Not on file   Social History Narrative    Not on file           ROS:  [x] All negative/unchanged except if checked.  Explain positive(checked items) below:  [] Constitutional  [] Eyes  [] Ear/Nose/Mouth/Throat  [] Respiratory  [] CV  [] GI  []   [] Musculoskeletal  [] Skin/Breast  [] Neurological  [] Endocrine  [] Heme/Lymph  [] Allergic/Immunologic    Explanation:      MEDICATIONS:    Current Facility-Administered Medications:     QUEtiapine (SEROQUEL XR) extended release tablet 400 mg, 400 mg, Oral, Nightly, Nils Elziondo MD, 400 mg at 04/06/21 2121    aspirin-acetaminophen-caffeine (Tenny Spruce) per tablet 1 tablet, 1 tablet, Oral, Q6H PRN, Norisgabetong Lawsona, APRN - CNP, 1 tablet at 04/07/21 0910    albuterol sulfate  (90 Base) MCG/ACT inhaler 2 puff, 2 puff, Inhalation, Q6H PRN, Akshat Pace, APRN - CNP, 2 puff at 04/07/21 0831    amitriptyline (ELAVIL) tablet 50 mg, 50 mg, Oral, Nightly, Akshat Pace, APRN - CNP, 50 mg at 04/06/21 2121    busPIRone (BUSPAR) tablet 15 mg, 15 mg, Oral, TID, Akshat Pace, APRN - CNP, 15 mg at 04/07/21 1409    fluticasone (FLONASE) 50 MCG/ACT nasal spray 1 spray, 1 spray, Each Nostril, Daily, Akshat Pace, APRN - CNP, 1 spray at 04/07/21 0835    gabapentin (NEURONTIN) capsule 800 mg, 800 mg, Oral, TID, Akshat Pace, APRN - CNP, 800 mg at 04/07/21 1409    ipratropium (ATROVENT HFA) 17 MCG/ACT inhaler 2 puff, 2 puff, Inhalation, Q4H PRN, Akshat Pace, APRN - CNP, 2 puff at 04/05/21 8407    lamoTRIgine (LAMICTAL) tablet 100 mg, 100 mg, Oral, BID, Akshat Pace, APRN - CNP, 100 mg at 04/07/21 0835    levETIRAcetam (KEPPRA) tablet 500 mg, 500 mg, Oral, BID, Akshat Pace, APRN - CNP, 500 mg at 04/07/21 0835    magnesium oxide (MAG-OX) tablet 400 mg, 400 mg, Oral, Daily, Akshat Pace, APRN - CNP, 400 mg at 04/07/21 0831    naproxen (NAPROSYN) tablet 500 mg, 500 mg, Oral, BID WC, Akshat Pace, APRN - CNP, 500 mg at 04/07/21 0836    predniSONE (DELTASONE) tablet 10 mg, 10 mg, Oral, Daily, Akshat Pace, APRN - CNP, 10 mg at 04/07/21 0836    [START ON 4/8/2021] predniSONE (DELTASONE) tablet 5 mg, 5 mg, Oral, Daily, Akshat Pace, APRN - CNP    propranolol (INDERAL) tablet 80 mg, 80 mg, Oral, Daily, Akshat Pace, APRN - CNP, 80 mg at 04/07/21 9873    haloperidol lactate (HALDOL) injection 5 mg, 5 mg, Intramuscular, Q4H PRN, 5 mg at 04/04/21 0805 **OR** [DISCONTINUED] haloperidol (HALDOL) tablet 5 mg, 5 mg, Oral, Q4H PRN, Sharon Allred MD, 5 mg at 04/05/21 1223    thiamine mononitrate tablet 100 mg, 100 mg, Oral, Daily, Sharon Allred MD, 100 mg at 04/07/21 4185    therapeutic multivitamin-minerals 1 tablet, 1 tablet, Oral, Daily, Sharon Allred MD, 1 tablet at 04/07/21 0835    acetaminophen (TYLENOL) tablet 650 mg, 650 mg, Oral, Q4H PRN, Celio Marvin MD    aluminum & magnesium hydroxide-simethicone (MAALOX) 200-200-20 MG/5ML suspension 30 mL, 30 mL, Oral, Q6H PRN, Celio Marvin MD    hydrOXYzine (ATARAX) tablet 50 mg, 50 mg, Oral, TID PRN, Celio Marvin MD, 50 mg at 04/07/21 0910    nicotine polacrilex (NICORETTE) gum 2 mg, 2 mg, Oral, PRN, Celio Marvin MD, 2 mg at 04/07/21 0835    polyethylene glycol (GLYCOLAX) packet 17 g, 17 g, Oral, Daily PRN, Celio Marvin MD      Examination:  /72   Pulse 92   Temp 98.6 °F (37 °C) (Oral)   Resp 15   Ht 5' 9\" (1.753 m)   Wt 169 lb (76.7 kg)   BMI 24.96 kg/m²   Gait - steady   Medication side effects(SE): Denies    Mental Status Examination:    Level of consciousness: Awake and alert  Appearance: Casually dressed with good grooming and hygiene  Behavior/Motor: Approachable, no psychomotor abnormality  Attitude toward examiner: cooperative, engaged in interview with good eye contact  Mood: Depressed  Affect: Mood congruent  Thought processes: Logical, goal oriented forward thinking  Thought content:  Homicidal ideation - none  Suicidal Ideation: Passive, contracts for safety  Delusions: Denies, none observed or reported by staff  Perceptual Disturbance: Denies auditory, visual or tactile hallucinations  Cognition: Oriented to person, location general circumstance  Concentration fair  Insight poor  Judgement poor    ASSESSMENT:   Patient symptoms are:  [] Well controlled  [] Improving  [] Worsening  [x] No change      Diagnosis:  Principal Problem:    MDD (major depressive disorder), recurrent severe, without psychosis ([facility] [address]  Active Problems:    Depression with suicidal ideation    Alcohol withdrawal ([facility] [address]  Resolved Problems:    * No resolved hospital problems. *      LABS:    No results for input(s): WBC, HGB, PLT in the last 72 hours. No results for input(s): NA, K, CL, CO2, BUN, CREATININE, GLUCOSE in the last 72 hours. No results for input(s): BILITOT, ALKPHOS, AST, ALT in the last 72 hours. Lab Results   Component Value Date    BARBSCNU NEGATIVE 09/06/2020    LABBENZ NEGATIVE 09/06/2020    LABMETH NEGATIVE 09/06/2020    PPXUR NOT REPORTED 09/06/2020     No results found for: TSH, FREET4  No results found for: LITHIUM  Lab Results   Component Value Date    VALPROATE 60 09/06/2020       Treatment Plan:  Reviewed current Medications with the patient. Discontinue Elavil  Start Remeron 15 mg at bedtime    Social work to assist patient in recovery services after discharge    Risks, benefits, side effects, drug-to-drug interactions and alternatives to treatment were discussed. The patient  consented to treatment. Encourage patient to attend group and other milieu activities. Discharge planning discussed with the patient and treatment team.  Daily while inpatient    PSYCHOTHERAPY/COUNSELING:  [] Therapeutic interview  [x] Supportive  [] CBT  [] Ongoing  [] Other    [x] Patient continues to need, on a daily basis, active treatment furnished directly by or requiring the supervision of inpatient psychiatric personnel      Anticipated Length of stay:Per attending psychiatric physician                                         Alex Diaz is a 50 y.o. male being evaluated face to face. --Ivone Fine, THERESA - CNP on 4/7/2021 at 3:51 PM    An electronic signature was used to authenticate this note. **This report has been created using voice recognition software. It may contain minor errors which are inherent in voice recognition technology. **      I

## 2021-04-08 PROCEDURE — 99232 SBSQ HOSP IP/OBS MODERATE 35: CPT | Performed by: PSYCHIATRY & NEUROLOGY

## 2021-04-08 PROCEDURE — 6370000000 HC RX 637 (ALT 250 FOR IP): Performed by: NURSE PRACTITIONER

## 2021-04-08 PROCEDURE — 1240000000 HC EMOTIONAL WELLNESS R&B

## 2021-04-08 PROCEDURE — 90833 PSYTX W PT W E/M 30 MIN: CPT | Performed by: PSYCHIATRY & NEUROLOGY

## 2021-04-08 PROCEDURE — 6360000002 HC RX W HCPCS: Performed by: PSYCHIATRY & NEUROLOGY

## 2021-04-08 PROCEDURE — 6370000000 HC RX 637 (ALT 250 FOR IP): Performed by: PSYCHIATRY & NEUROLOGY

## 2021-04-08 RX ORDER — QUETIAPINE FUMARATE 50 MG/1
50 TABLET, EXTENDED RELEASE ORAL DAILY
Status: DISCONTINUED | OUTPATIENT
Start: 2021-04-08 | End: 2021-04-12 | Stop reason: HOSPADM

## 2021-04-08 RX ADMIN — BUSPIRONE HYDROCHLORIDE 15 MG: 15 TABLET ORAL at 20:52

## 2021-04-08 RX ADMIN — HYDROXYZINE HYDROCHLORIDE 50 MG: 50 TABLET, FILM COATED ORAL at 08:41

## 2021-04-08 RX ADMIN — FLUTICASONE PROPIONATE 1 SPRAY: 50 SPRAY, METERED NASAL at 08:31

## 2021-04-08 RX ADMIN — LAMOTRIGINE 100 MG: 100 TABLET ORAL at 08:31

## 2021-04-08 RX ADMIN — Medication 1.5 MG: at 20:51

## 2021-04-08 RX ADMIN — LEVETIRACETAM 500 MG: 500 TABLET ORAL at 08:31

## 2021-04-08 RX ADMIN — MIRTAZAPINE 7.5 MG: 15 TABLET, FILM COATED ORAL at 20:51

## 2021-04-08 RX ADMIN — LEVETIRACETAM 500 MG: 500 TABLET ORAL at 20:52

## 2021-04-08 RX ADMIN — NAPROXEN 500 MG: 500 TABLET ORAL at 17:30

## 2021-04-08 RX ADMIN — MULTIPLE VITAMINS W/ MINERALS TAB 1 TABLET: TAB at 08:31

## 2021-04-08 RX ADMIN — ACETAMINOPHEN, ASPIRIN AND CAFFEINE 1 TABLET: 250; 250; 65 TABLET, FILM COATED ORAL at 08:41

## 2021-04-08 RX ADMIN — GABAPENTIN 800 MG: 400 CAPSULE ORAL at 14:25

## 2021-04-08 RX ADMIN — Medication 400 MG: at 08:31

## 2021-04-08 RX ADMIN — GABAPENTIN 800 MG: 400 CAPSULE ORAL at 08:30

## 2021-04-08 RX ADMIN — QUETIAPINE FUMARATE 400 MG: 400 TABLET, EXTENDED RELEASE ORAL at 20:52

## 2021-04-08 RX ADMIN — THIAMINE HCL TAB 100 MG 100 MG: 100 TAB at 08:31

## 2021-04-08 RX ADMIN — NAPROXEN 500 MG: 500 TABLET ORAL at 08:30

## 2021-04-08 RX ADMIN — HALOPERIDOL LACTATE 5 MG: 5 INJECTION, SOLUTION INTRAMUSCULAR at 17:31

## 2021-04-08 RX ADMIN — NICOTINE POLACRILEX 2 MG: 2 GUM, CHEWING BUCCAL at 18:46

## 2021-04-08 RX ADMIN — BUSPIRONE HYDROCHLORIDE 15 MG: 15 TABLET ORAL at 14:25

## 2021-04-08 RX ADMIN — ALBUTEROL SULFATE 2 PUFF: 90 AEROSOL, METERED RESPIRATORY (INHALATION) at 08:41

## 2021-04-08 RX ADMIN — PROPRANOLOL HYDROCHLORIDE 80 MG: 20 TABLET ORAL at 08:30

## 2021-04-08 RX ADMIN — HYDROXYZINE HYDROCHLORIDE 50 MG: 50 TABLET, FILM COATED ORAL at 17:30

## 2021-04-08 RX ADMIN — GABAPENTIN 800 MG: 400 CAPSULE ORAL at 20:52

## 2021-04-08 RX ADMIN — PREDNISONE 5 MG: 5 TABLET ORAL at 08:31

## 2021-04-08 RX ADMIN — BUSPIRONE HYDROCHLORIDE 15 MG: 15 TABLET ORAL at 08:31

## 2021-04-08 RX ADMIN — LAMOTRIGINE 100 MG: 100 TABLET ORAL at 20:52

## 2021-04-08 ASSESSMENT — PAIN SCALES - GENERAL
PAINLEVEL_OUTOF10: 7
PAINLEVEL_OUTOF10: 3

## 2021-04-08 NOTE — BH NOTE
Patient agitated with another peer on the unit, minimal verbal altercation. Attempted redirection, offered 1:1 talk, distraction. Patient requested medications for agitation and anxiety. Provided prns, patient tolerated administration well.

## 2021-04-08 NOTE — PLAN OF CARE
Problem: Depressive Behavior With or Without Suicide Precautions:  Goal: Ability to disclose and discuss suicidal ideas will improve  Description: Ability to disclose and discuss suicidal ideas will improve  4/8/2021 0029 by Leandra Hernandez RN  Outcome: Ongoing  Patient denied suicidal ideations at the time of assessment. Patient stated he was having no thoughts of wanting to harm self but he was feeling depressed and anxious, which is said is normal. The patient was bright and social with staff and peers. The patient has been compliant with his treatment and medication regimen. Patient observed every 15 minutes and as needed for safety and environmental factors. Problem: Tobacco Use:  Goal: Inpatient tobacco use cessation counseling participation  Description: Inpatient tobacco use cessation counseling participation  4/8/2021 0029 by Leandra Hernandez RN  Outcome: Ongoing  Patient refused tobacco use cessation counseling.

## 2021-04-08 NOTE — PROGRESS NOTES
BEHAVIORAL HEALTH FOLLOW-UP NOTE     4/8/2021     Patient was seen and examined in person, Chart reviewed   Patient's case discussed with staff/team    Chief Complaint: Depression and alcohol withdrawal    Interim History:   Patient has been compliant with medications, using Atarax as needed for anxiety. Patient has been attending groups. Staff have observed him on unit social with peers. Interviewed patient in the common area. We discussed his recovery housing. Reviewed with him that there were no plans by social work for recovery housing, patient reports he must have misunderstood as he thought that he had recovery housing yesterday. Reviewed with him the various options in the area that offer alcohol inpatient 30-day or more programs. Patient was able to identify 4 programs that he will contact today. He reports that his depression is improving, rates his depression 6/10 (10 worst) he is reporting anxiety with agitation in the mornings. He finds it difficult to control his temper at times in the morning, is requesting Seroquel. Discussed with him we would not be giving him instant release Seroquel, he was okay with starting the Seroquel XR 50 mg in the mornings. He reports that last night he slept 6 full hours, felt as though sleep is more restful. His appetite is improving. He denies any alcohol withdrawal symptoms. He does have alcohol cravings. We discussed Vivitrol, he is unsure, encouraged him to think about it tonight we would approach it again in the morning.   AST and ALT are within normal limits  Denies any medication side effects      Appetite:  [x] Normal/Unchanged  [] Increased  [] Decreased      Sleep:       [] Normal/Unchanged  [x] Fair       [] Poor              Energy:    [x] Normal/Unchanged  [] Increased  [] Decreased        Aggression:  [] yes  [x] no    Patient is [x] able  [] unable to CONTRACT FOR SAFETY ON THE UNIT    PAST MEDICAL/PSYCHIATRIC HISTORY:   Past Medical History: Diagnosis Date    COPD (chronic obstructive pulmonary disease) ([facility]ly [facility])     Hep B w/ coma     Seizures (Formerly [facility])        FAMILY/SOCIAL HISTORY:  No family history on file. Social History     Socioeconomic History    Marital status:      Spouse name: Not on file    Number of children: Not on file    Years of education: Not on file    Highest education level: Not on file   Occupational History    Not on file   Social Needs    Financial resource strain: Not on file    Food insecurity     Worry: Not on file     Inability: Not on file    Transportation needs     Medical: Not on file     Non-medical: Not on file   Tobacco Use    Smoking status: Current Every Day Smoker     Packs/day: 1.00     Types: Cigarettes    Smokeless tobacco: Never Used   Substance and Sexual Activity    Alcohol use: Yes     Frequency: Never    Drug use: Yes     Types: Marijuana    Sexual activity: Not on file   Lifestyle    Physical activity     Days per week: Not on file     Minutes per session: Not on file    Stress: Not on file   Relationships    Social connections     Talks on phone: Not on file     Gets together: Not on file     Attends Hinduism service: Not on file     Active member of club or organization: Not on file     Attends meetings of clubs or organizations: Not on file     Relationship status: Not on file    Intimate partner violence     Fear of current or ex partner: Not on file     Emotionally abused: Not on file     Physically abused: Not on file     Forced sexual activity: Not on file   Other Topics Concern    Not on file   Social History Narrative    Not on file           ROS:  [x] All negative/unchanged except if checked.  Explain positive(checked items) below:  [x] Constitutional  [] Eyes  [] Ear/Nose/Mouth/Throat  [] Respiratory  [] CV  [] GI  []   [] Musculoskeletal  [] Skin/Breast  [] Neurological  [] Endocrine  [] Heme/Lymph  [] Allergic/Immunologic    Explanation:      MEDICATIONS:    Current Facility-Administered Medications:     QUEtiapine (SEROQUEL XR) extended release tablet 50 mg, 50 mg, Oral, Daily, THERESA Lawson CNP    melatonin tablet 1.5 mg, 1.5 mg, Oral, Nightly, Chi Elizondo MD, 1.5 mg at 04/07/21 2045    mirtazapine (REMERON) tablet 7.5 mg, 7.5 mg, Oral, Nightly, Chi Elizondo MD, 7.5 mg at 04/07/21 2045    QUEtiapine (SEROQUEL XR) extended release tablet 400 mg, 400 mg, Oral, Nightly, Graeme Hein MD, 400 mg at 04/07/21 2045    aspirin-acetaminophen-caffeine (EXCEDRIN MIGRAINE) per tablet 1 tablet, 1 tablet, Oral, Q6H PRN, THERESA Lawson - CNP, 1 tablet at 04/08/21 0841    albuterol sulfate  (90 Base) MCG/ACT inhaler 2 puff, 2 puff, Inhalation, Q6H PRN, Vernard Haste, APRN - CNP, 2 puff at 04/08/21 0841    busPIRone (BUSPAR) tablet 15 mg, 15 mg, Oral, TID, Vernard Haste, APRN - CNP, 15 mg at 04/08/21 1425    fluticasone (FLONASE) 50 MCG/ACT nasal spray 1 spray, 1 spray, Each Nostril, Daily, Vernard Haste, APRN - CNP, 1 spray at 04/08/21 0831    gabapentin (NEURONTIN) capsule 800 mg, 800 mg, Oral, TID, Vernard Haste, APRN - CNP, 800 mg at 04/08/21 1425    ipratropium (ATROVENT HFA) 17 MCG/ACT inhaler 2 puff, 2 puff, Inhalation, Q4H PRN, Vernard Haste, APRN - CNP, 2 puff at 04/05/21 4963    lamoTRIgine (LAMICTAL) tablet 100 mg, 100 mg, Oral, BID, Vernard Haste, APRN - CNP, 100 mg at 04/08/21 0831    levETIRAcetam (KEPPRA) tablet 500 mg, 500 mg, Oral, BID, Vernard Haste, APRN - CNP, 500 mg at 04/08/21 0831    magnesium oxide (MAG-OX) tablet 400 mg, 400 mg, Oral, Daily, Vernard Haste, APRN - CNP, 400 mg at 04/08/21 0831    naproxen (NAPROSYN) tablet 500 mg, 500 mg, Oral, BID WC, Vernard Haste, APRN - CNP, 500 mg at 04/08/21 0830    predniSONE (DELTASONE) tablet 5 mg, 5 mg, Oral, Daily, Vernard Haste, APRN - CNP, 5 mg at 04/08/21 0831    propranolol (INDERAL) tablet 80 mg, 80 mg, Oral, Daily, Vernard Haste, APRN - CNP, 80 mg at 04/08/21 0830    haloperidol lactate (HALDOL) injection 5 mg, 5 mg, Intramuscular, Q4H PRN, 5 mg at 04/04/21 0805 **OR** [DISCONTINUED] haloperidol (HALDOL) tablet 5 mg, 5 mg, Oral, Q4H PRN, Teo Hernandez MD, 5 mg at 04/05/21 1223    thiamine mononitrate tablet 100 mg, 100 mg, Oral, Daily, Teo Hernandez MD, 100 mg at 04/08/21 0831    therapeutic multivitamin-minerals 1 tablet, 1 tablet, Oral, Daily, Teo Hernandez MD, 1 tablet at 04/08/21 0831    acetaminophen (TYLENOL) tablet 650 mg, 650 mg, Oral, Q4H PRN, Tj Argueta MD    aluminum & magnesium hydroxide-simethicone (MAALOX) 200-200-20 MG/5ML suspension 30 mL, 30 mL, Oral, Q6H PRN, Tj Argueta MD    hydrOXYzine (ATARAX) tablet 50 mg, 50 mg, Oral, TID PRN, Tj Argueta MD, 50 mg at 04/08/21 0841    nicotine polacrilex (NICORETTE) gum 2 mg, 2 mg, Oral, PRN, Tj Argueta MD, 2 mg at 04/07/21 1945    polyethylene glycol (GLYCOLAX) packet 17 g, 17 g, Oral, Daily PRN, Tj Argueta MD      Examination:  /74   Pulse 81   Temp 98.9 °F (37.2 °C) (Oral)   Resp 14   Ht 5' 9\" (1.753 m)   Wt 169 lb (76.7 kg)   BMI 24.96 kg/m²   Gait - steady   Medication side effects(SE): Denies    Mental Status Examination:    Level of consciousness: Awake and alert  Appearance: Casually dressed with fair grooming and hygiene  Behavior/Motor: Calmer today, smiled upon approach.   No psychomotor abnormality  Attitude toward examiner: cooperative, engaged in interview with good eye contact  Mood: Depressed  Affect: Mood congruent  Thought processes: Logical, goal oriented  Thought content:  Homicidal ideation - none, patient reports feeling agitated, but no threats to staff or peers  Suicidal Ideation: Improving, contracts for safety  Delusions: Denies, none observed or reported by staff  Perceptual Disturbance: Denies auditory, visual or tactile hallucinations  Cognition: Oriented to person, face.     --Reanna Holloway, APRN - CNP on 4/8/2021 at 2:52 PM    An electronic signature was used to authenticate this note. **This report has been created using voice recognition software. It may contain minor errors which are inherent in voice recognition technology. **    I independently saw and evaluated the patient. I reviewed the nurse practitioners documentation above. Any additional comments or changes to the nurse practitioners documentation are stated below otherwise agree with assessment. Plan will be as follows:  Spent 30 minutes with the patient. Of that greater than 16 minutes spent in supportive psychotherapy. Patient reports substantial improvement with sleep and the medication adjustments we made yesterday. States he slept about 6-1/2 hours and felt like he was on \"cloud 9\" this morning when he awoke. Actively working for inpatient rehab. Happy with current medications though we did discuss addition of Seroquel XR in the morning  PLAN  Patient s symptoms   are improving  Add Seroquel extended release 50 mg in the morning  Attempt to develop insight  Psycho-education conducted. Supportive Therapy conducted.   Probable discharge is pending confirmation of placement in rehab  Follow-up daily while on inpatient unit

## 2021-04-08 NOTE — GROUP NOTE
Group Therapy Note    Date: 4/8/2021    Group Start Time:1330  Group End Time: 1430  Group Topic: cognitive skills     [facility], [facility]        Group Therapy Note    Attendees:9         Patient's Goal: to improve interpersonal relationships/ improve communication      Status After Intervention:  Improved    Participation Level:  Active Listener    Participation Quality: Appropriate,       Speech:  normal      Thought Process/Content:flight of ideas      Affective Functioning: Congruent      Mood:manic    Level of consciousness:  Alert,     Response to Learning: Able to verbalize current knowledge/experience and Progressing to goal      Endings: None Reported    Modes of Intervention: Education, Support and Problem-solving      Discipline Responsible: Psychoeducational Specialist      Signature:  Flora Tavarez

## 2021-04-08 NOTE — GROUP NOTE
Group Therapy Note    Date: 4/8/2021    Group Start Time: 1430  Group End Time: 2257  Group Topic: Cognitive Skills    KIA Tolbert        Group Therapy Note    Attendees: 9/14         Patient's Goal:  To increase social interaction and to practice concentration, and communication skills. Notes: Pt participated fully in group . Pt was able to practice concentration, and communication skills. Pt was pleasant and supportive of peers. .     Status After Intervention:  Improved         Participation Level:  Active Listener and Interactive     Participation Quality: Appropriate, Attentive, Sharing and Supportive        Speech:  normal         Thought Process/Content: Logical  Linear        Affective Functioning: Blunted        Mood: euthymic         Level of consciousness:  Alert, Oriented x4 and Attentive        Response to Learning: Able to verbalize current knowledge/experience, Able to verbalize/acknowledge new learning, Able to retain information and Progressing to goal        Endings: None Reported     Modes of Intervention: Education, Support, Socialization, Exploration, Clarifying and Problem-solving        Discipline Responsible: Psychoeducational Specialist        Signature:  Mike Aquino

## 2021-04-08 NOTE — GROUP NOTE
Group Therapy Note    Date: 4/8/2021    Group Start Time: 1000  Group End Time: 5548  Group Topic: Cognitive Skills    MARY Rome, DESHAUNS        Group Therapy Note    Attendees: 6/6         Patient's Goal:  To increase social interaction and to practice problem solving, and communication skills. Notes: Pt participated fully in group . Pt was able to practice problem solving, and communication skills. Pt was pleasant and supportive of peers. .     Status After Intervention:  Improved         Participation Level: Active Listener and Interactive     Participation Quality: Appropriate, Attentive, Sharing and Supportive        Speech:  normal         Thought Process/Content: Logical r/t task but needs several redirections for side conversations and socializing with peers outside of group rather than focusing on task. Pt is asked to focus on task, as this socializing and lack of investment/focus in task is disruptive to group process and distracts peers. This is a common theme in patient's attendance in groups.           Affective Functioning: Bright,animated        Mood: euthymic         Level of consciousness:  Alert, Oriented x4 and Attentive        Response to Learning: Able to verbalize current knowledge/experience, Able to verbalize/acknowledge new learning, Able to retain information and Progressing to goal        Endings: None Reported     Modes of Intervention: Education, Support, Socialization, Exploration, Clarifying and Problem-solving        Discipline Responsible: Psychoeducational Specialist        Signature:  Lesvia Andersen

## 2021-04-08 NOTE — BH NOTE
Patient is hyper verbal and has increased motor activity, appears anxious. Offered distraction, relaxation, 1:1 talk. Provided patient prn atarax for anxiety.

## 2021-04-08 NOTE — BH NOTE
Patient yelling at peer on the phone, \"get of the fuck off the phone mother jolynn\" patient then walked down the his room pushing chairs and shoving the hamper for dirty laundry in the hallway, then slammed his door.

## 2021-04-09 PROCEDURE — 6360000002 HC RX W HCPCS: Performed by: PSYCHIATRY & NEUROLOGY

## 2021-04-09 PROCEDURE — 6370000000 HC RX 637 (ALT 250 FOR IP): Performed by: PSYCHIATRY & NEUROLOGY

## 2021-04-09 PROCEDURE — 6370000000 HC RX 637 (ALT 250 FOR IP): Performed by: NURSE PRACTITIONER

## 2021-04-09 PROCEDURE — 1240000000 HC EMOTIONAL WELLNESS R&B

## 2021-04-09 PROCEDURE — 99232 SBSQ HOSP IP/OBS MODERATE 35: CPT | Performed by: PSYCHIATRY & NEUROLOGY

## 2021-04-09 PROCEDURE — 90833 PSYTX W PT W E/M 30 MIN: CPT | Performed by: PSYCHIATRY & NEUROLOGY

## 2021-04-09 RX ORDER — ACETAMINOPHEN, ASPIRIN AND CAFFEINE 250; 250; 65 MG/1; MG/1; MG/1
2 TABLET, FILM COATED ORAL EVERY 6 HOURS PRN
Status: DISCONTINUED | OUTPATIENT
Start: 2021-04-09 | End: 2021-04-12 | Stop reason: HOSPADM

## 2021-04-09 RX ADMIN — Medication 400 MG: at 08:41

## 2021-04-09 RX ADMIN — PROPRANOLOL HYDROCHLORIDE 80 MG: 20 TABLET ORAL at 08:41

## 2021-04-09 RX ADMIN — LAMOTRIGINE 100 MG: 100 TABLET ORAL at 08:41

## 2021-04-09 RX ADMIN — PREDNISONE 5 MG: 5 TABLET ORAL at 08:43

## 2021-04-09 RX ADMIN — BUSPIRONE HYDROCHLORIDE 15 MG: 15 TABLET ORAL at 14:25

## 2021-04-09 RX ADMIN — QUETIAPINE FUMARATE 50 MG: 50 TABLET, EXTENDED RELEASE ORAL at 08:42

## 2021-04-09 RX ADMIN — GABAPENTIN 800 MG: 400 CAPSULE ORAL at 14:25

## 2021-04-09 RX ADMIN — BUSPIRONE HYDROCHLORIDE 15 MG: 15 TABLET ORAL at 20:50

## 2021-04-09 RX ADMIN — GABAPENTIN 800 MG: 400 CAPSULE ORAL at 20:53

## 2021-04-09 RX ADMIN — HYDROXYZINE HYDROCHLORIDE 50 MG: 50 TABLET, FILM COATED ORAL at 20:50

## 2021-04-09 RX ADMIN — NICOTINE POLACRILEX 2 MG: 2 GUM, CHEWING BUCCAL at 14:36

## 2021-04-09 RX ADMIN — ACETAMINOPHEN, ASPIRIN AND CAFFEINE 1 TABLET: 250; 250; 65 TABLET, FILM COATED ORAL at 09:40

## 2021-04-09 RX ADMIN — NAPROXEN 500 MG: 500 TABLET ORAL at 08:43

## 2021-04-09 RX ADMIN — MULTIPLE VITAMINS W/ MINERALS TAB 1 TABLET: TAB at 08:41

## 2021-04-09 RX ADMIN — ALBUTEROL SULFATE 2 PUFF: 90 AEROSOL, METERED RESPIRATORY (INHALATION) at 08:40

## 2021-04-09 RX ADMIN — HYDROXYZINE HYDROCHLORIDE 50 MG: 50 TABLET, FILM COATED ORAL at 03:10

## 2021-04-09 RX ADMIN — Medication 1.5 MG: at 20:47

## 2021-04-09 RX ADMIN — MIRTAZAPINE 7.5 MG: 15 TABLET, FILM COATED ORAL at 20:49

## 2021-04-09 RX ADMIN — FLUTICASONE PROPIONATE 1 SPRAY: 50 SPRAY, METERED NASAL at 08:42

## 2021-04-09 RX ADMIN — BUSPIRONE HYDROCHLORIDE 15 MG: 15 TABLET ORAL at 08:41

## 2021-04-09 RX ADMIN — QUETIAPINE FUMARATE 400 MG: 400 TABLET, EXTENDED RELEASE ORAL at 20:49

## 2021-04-09 RX ADMIN — HALOPERIDOL LACTATE 5 MG: 5 INJECTION, SOLUTION INTRAMUSCULAR at 17:01

## 2021-04-09 RX ADMIN — IPRATROPIUM BROMIDE 2 PUFF: 17 AEROSOL, METERED RESPIRATORY (INHALATION) at 13:53

## 2021-04-09 RX ADMIN — THIAMINE HCL TAB 100 MG 100 MG: 100 TAB at 08:41

## 2021-04-09 RX ADMIN — LAMOTRIGINE 100 MG: 100 TABLET ORAL at 20:50

## 2021-04-09 RX ADMIN — GABAPENTIN 800 MG: 400 CAPSULE ORAL at 08:41

## 2021-04-09 RX ADMIN — LEVETIRACETAM 500 MG: 500 TABLET ORAL at 20:49

## 2021-04-09 RX ADMIN — LEVETIRACETAM 500 MG: 500 TABLET ORAL at 08:41

## 2021-04-09 ASSESSMENT — PAIN - FUNCTIONAL ASSESSMENT: PAIN_FUNCTIONAL_ASSESSMENT: 0-10

## 2021-04-09 NOTE — GROUP NOTE
Group Therapy Note    Date: 4/9/2021    Group Start Time: 0883  Group End Time: 5534  Group Topic: Cognitive Skills    [facility] KIA Marrufo        Group Therapy Note    Attendees: 8/16         Patient's Goal:  To increase social interaction and to explore positive coping and resources , sharing feelings, and communication. Notes: Pt participated fully in group . Pt was able to explore positive coping and resources , sharing feelings, and communication through creative expression and individual/group discussion. Pt was supportive to peers and able to relate to some of their ideas and experiences. Status After Intervention:  Improved         Participation Level:  Active Listener and Interactive     Participation Quality: Appropriate, Attentive, Sharing and Supportive        Speech:  normal        Thought Process/Content: Logical  Linear        Affective Functioning: Congruent        Mood: euthymic        Level of consciousness:  Alert, Oriented x4 and Attentive        Response to Learning: Able to verbalize current knowledge/experience, Able to verbalize/acknowledge new learning, Able to retain information and Progressing to goal        Endings: None Reported     Modes of Intervention: Education, Support, Socialization, Exploration, Clarifying and Problem-solving        Discipline Responsible: Psychoeducational Specialist        Signature:  Maria De Jesus Soto

## 2021-04-09 NOTE — GROUP NOTE
Group Therapy Note    Date: 4/9/2021    Group Start Time: 1330  Group End Time: 8112  Group Topic: Cognitive Skills    [facility]    Moe Swanson, CTRS        Group Therapy Note    Attendees:        Patient's Goal:  To increase social interaction and to practice problem solving, and communication skills. Notes: Pt participated fully in group . Pt was able to practice problem solving, and communication skills. Pt was pleasant and supportive of peers. .     Status After Intervention:  Improved         Participation Level:  Active Listener and Interactive     Participation Quality: Appropriate, Attentive, Sharing and Supportive        Speech:  normal         Thought Process/Content: Logical  Linear        Affective Functioning: Congruent        Mood: euthymic         Level of consciousness:  Alert, Oriented x4 and Attentive        Response to Learning: Able to verbalize current knowledge/experience, Able to verbalize/acknowledge new learning, Able to retain information and Progressing to goal        Endings: None Reported     Modes of Intervention: Education, Support, Socialization, Exploration, Clarifying and Problem-solving        Discipline Responsible: Psychoeducational Specialist        Signature:  Omre Mora

## 2021-04-09 NOTE — GROUP NOTE
Group Therapy Note    Date: 4/9/2021    Group Start Time: 0900  Group End Time: 0930  Group Topic: Community Meeting    MARY ALEXIS DAKSHA DumontksJuan M Adalijaxson        Group Therapy Note    Attendees: 6/17            Patient's Goal:  To increase social interaction and to explore and identify short term goals r/t wellness and recovery. RT discussed group schedule and unit structure/resources and encouraged pts to be engaged in their treatment. Pts were given opportunities to ask questions. Notes: Pt participated in group task and was able to identify short term goals r/t wellness and recovery. Pt is pleasant and supportive of peers        Status After Intervention:  Improved     Participation Level: Active Listener and Interactive     Participation Quality: Appropriate, Attentive, Sharing         Speech:   Normal     Thought Process/Content: Logical, needs encouragement to focus on group task due to side conversations. Resistant at first but accepting of redirection after 1:1 with RT after group.      Affective Functioning: Congruent   Mood: euthymic        Level of consciousness:  Alert, and Attentive        Response to Learning: Able to verbalize current knowledge/experience, Able to verbalize/acknowledge new learning, and Progressing to goal        Endings: None Reported     Modes of Intervention: Education, Support, Socialization, Exploration, Clarifying and Problem-solving        Discipline Responsible: Psychoeducational Specialist        Signature:  Shyla Franco

## 2021-04-09 NOTE — PROGRESS NOTES
planning or plotting to kill him. He also reports improved suicidal ideation and continues to verbally contract for safety. He denies any current cravings, his Librium taper is complete. He is using Excedrin Migraine once a day, he is requesting when he gets headaches to take 2 pills instead of 1. Does not appear that he has been using this medication 4 times a day therefore we will switch his as needed order to 2 pills every 6 hours as needed. Again staff is encouraged to monitor patient for cheeking medication. Appetite:  [x] Normal/Unchanged  [] Increased  [] Decreased      Sleep:       [] Normal/Unchanged  [x] Fair       [] Poor              Energy:    [x] Normal/Unchanged  [] Increased  [] Decreased        Aggression:  [] yes  [x] no    Patient is [x] able  [] unable to CONTRACT FOR SAFETY ON THE UNIT    PAST MEDICAL/PSYCHIATRIC HISTORY:   Past Medical History:   Diagnosis Date    COPD (chronic obstructive pulmonary disease) ([facility]     Hep B w/ coma     Seizures ([facility]        FAMILY/SOCIAL HISTORY:  No family history on file. Social History     Socioeconomic History    Marital status:       Spouse name: Not on file    Number of children: Not on file    Years of education: Not on file    Highest education level: Not on file   Occupational History    Not on file   Social Needs    Financial resource strain: Not on file    Food insecurity     Worry: Not on file     Inability: Not on file    Transportation needs     Medical: Not on file     Non-medical: Not on file   Tobacco Use    Smoking status: Current Every Day Smoker     Packs/day: 1.00     Types: Cigarettes    Smokeless tobacco: Never Used   Substance and Sexual Activity    Alcohol use: Yes     Frequency: Never    Drug use: Yes     Types: Marijuana    Sexual activity: Not on file   Lifestyle    Physical activity     Days per week: Not on file     Minutes per session: Not on file    Stress: Not on file   Relationships    Social connections     Talks on phone: Not on file     Gets together: Not on file     Attends Religion service: Not on file     Active member of club or organization: Not on file     Attends meetings of clubs or organizations: Not on file     Relationship status: Not on file    Intimate partner violence     Fear of current or ex partner: Not on file     Emotionally abused: Not on file     Physically abused: Not on file     Forced sexual activity: Not on file   Other Topics Concern    Not on file   Social History Narrative    Not on file           ROS:  [x] All negative/unchanged except if checked.  Explain positive(checked items) below:  [x] Constitutional  [] Eyes  [] Ear/Nose/Mouth/Throat  [] Respiratory  [] CV  [] GI  []   [] Musculoskeletal  [] Skin/Breast  [] Neurological  [] Endocrine  [] Heme/Lymph  [] Allergic/Immunologic    Explanation: Headache  MEDICATIONS:    Current Facility-Administered Medications:     aspirin-acetaminophen-caffeine (EXCEDRIN MIGRAINE) per tablet 2 tablet, 2 tablet, Oral, Q6H PRN, Cheri Greenfield APRN - CNP    QUEtiapine (SEROQUEL XR) extended release tablet 50 mg, 50 mg, Oral, Daily, Lynn Gastelum APRN - CNP, 50 mg at 04/09/21 0391    melatonin tablet 1.5 mg, 1.5 mg, Oral, Nightly, Luis Massey MD, 1.5 mg at 04/08/21 2051    mirtazapine (REMERON) tablet 7.5 mg, 7.5 mg, Oral, Nightly, Luis Massey MD, 7.5 mg at 04/08/21 2051    QUEtiapine (SEROQUEL XR) extended release tablet 400 mg, 400 mg, Oral, Nightly, Luis Massey MD, 400 mg at 04/08/21 2052    albuterol sulfate  (90 Base) MCG/ACT inhaler 2 puff, 2 puff, Inhalation, Q6H PRN, Yevgeniy Essence APRN - CNP, 2 puff at 04/09/21 0840    busPIRone (BUSPAR) tablet 15 mg, 15 mg, Oral, TID, Yevgeniy Broccoli, APRN - CNP, 15 mg at 04/09/21 1425    fluticasone (FLONASE) 50 MCG/ACT nasal spray 1 spray, 1 spray, Each Nostril, Daily, THERESA Figueroa - CNP, 1 spray at 04/09/21 0842    gabapentin (NEURONTIN) capsule 800 mg, 800 mg, Oral, TID, THERESA Triana - CNP, 800 mg at 04/09/21 1425    ipratropium (ATROVENT HFA) 17 MCG/ACT inhaler 2 puff, 2 puff, Inhalation, Q4H PRN, THERESA Triana - CNP, 2 puff at 04/09/21 1353    lamoTRIgine (LAMICTAL) tablet 100 mg, 100 mg, Oral, BID, THERESA Triana - CNP, 100 mg at 04/09/21 0841    levETIRAcetam (KEPPRA) tablet 500 mg, 500 mg, Oral, BID, THERESA Triana - CNP, 500 mg at 04/09/21 0841    magnesium oxide (MAG-OX) tablet 400 mg, 400 mg, Oral, Daily, THERESA Triana - CNP, 400 mg at 04/09/21 0841    naproxen (NAPROSYN) tablet 500 mg, 500 mg, Oral, BID WC, THERESA Triana - CNP, 500 mg at 04/09/21 0843    predniSONE (DELTASONE) tablet 5 mg, 5 mg, Oral, Daily, THERESA Triana - CNP, 5 mg at 04/09/21 0843    propranolol (INDERAL) tablet 80 mg, 80 mg, Oral, Daily, THERESA Triana - CNP, 80 mg at 04/09/21 0841    haloperidol lactate (HALDOL) injection 5 mg, 5 mg, Intramuscular, Q4H PRN, 5 mg at 04/09/21 1701 **OR** [DISCONTINUED] haloperidol (HALDOL) tablet 5 mg, 5 mg, Oral, Q4H PRN, Maria C Urrutia MD, 5 mg at 04/05/21 1223    thiamine mononitrate tablet 100 mg, 100 mg, Oral, Daily, Maria C Urrutia MD, 100 mg at 04/09/21 0841    therapeutic multivitamin-minerals 1 tablet, 1 tablet, Oral, Daily, Maria C Urrutia MD, 1 tablet at 04/09/21 0841    acetaminophen (TYLENOL) tablet 650 mg, 650 mg, Oral, Q4H PRN, Se Cordero MD    aluminum & magnesium hydroxide-simethicone (MAALOX) 200-200-20 MG/5ML suspension 30 mL, 30 mL, Oral, Q6H PRN, Se Cordero MD    hydrOXYzine (ATARAX) tablet 50 mg, 50 mg, Oral, TID PRN, Se Cordero MD, 50 mg at 04/09/21 0310    nicotine polacrilex (NICORETTE) gum 2 mg, 2 mg, Oral, PRN, Se Cordero MD, 2 mg at 04/09/21 1436    polyethylene glycol (GLYCOLAX) packet 17 g, 17 g, Oral, Daily PRN, Se Cordero MD      Examination:  BP 117/77   Pulse 78   Temp 98.1 °F (36.7 °C) (Oral)   Resp 14   Ht 5' 9\" (1.753 m)   Wt 169 lb (76.7 kg)   BMI 24.96 kg/m²   Gait - steady   Medication side effects(SE): Denies    Mental Status Examination:    Level of consciousness: Awake and alert  Appearance: Casually dressed with fair grooming and hygiene  Behavior/Motor: Pleasant upon approach, sarcastic. No psychomotor abnormality  Attitude toward examiner: cooperative, engaged in interview with good eye contact  Mood: \"Better today\"  Affect: Mood congruent  Thought processes: Logical, goal oriented  Thought content: No specific homicidal ideation -though does report that if he ever got into it with his son in law he believes he would kill him   suicidal Ideation: Improving, contracts for safety  Delusions: Denies, none observed or reported by staff  Perceptual Disturbance: Denies auditory, visual or tactile hallucinations  Cognition: Oriented to person, location general circumstance  Concentration distractible  Insight poor  Judgement poor    ASSESSMENT:   Patient symptoms are:  [] Well controlled  [x] Improving  [] Worsening  [] No change      Diagnosis:  Principal Problem:    MDD (major depressive disorder), recurrent severe, without psychosis ([facility]  Active Problems:    Depression with suicidal ideation    Al[facility] withdrawal [facility]  Resolved Problems:    * No resolved hospital problems. *      LABS:    No results for input(s): WBC, HGB, PLT in the last 72 hours. No results for input(s): NA, K, CL, CO2, BUN, CREATININE, GLUCOSE in the last 72 hours. No results for input(s): BILITOT, ALKPHOS, AST, ALT in the last 72 hours.   Lab Results   Component Value Date    BARBSCNU NEGATIVE 09/06/2020    LABBENZ NEGATIVE 09/06/2020    LABMETH NEGATIVE 09/06/2020    PPXUR NOT REPORTED 09/06/2020     No results found for: TSH, FREET4  No results found for: LITHIUM  Lab Results   Component Value Date    VALPROATE 60 09/06/2020       Treatment Plan:  Reviewed current Medications with the patient. Patient to call full [facility] today prior to 4 PM  Increase Excedrin to 2 tabs as needed every 6 hours  Monitor need and frequency of as needed medication, monitor to make sure that patient is not cheeking medication  Continue current scheduled medication regimen      Risks, benefits, side effects, drug-to-drug interactions and alternatives to treatment were discussed. The patient  consented to treatment. Encourage patient to attend group and other milieu activities. Discharge planning discussed with the patient and treatment team, he will likely discharge to [facility] this week  Follow-up daily while on inpatient unit    PSYCHOTHERAPY/COUNSELING:  [] Therapeutic interview  [x] Supportive  [] CBT  [] Ongoing  [] Other    [x] Patient continues to need, on a daily basis, active treatment furnished directly by or requiring the supervision of inpatient psychiatric personnel      Anticipated Length of stay: Pending discharge, likely Monday to full [facility]                                     Med Mendoza is a 50 y.o. male being evaluated face to face. --Elba Abad, APRN - CNP on 4/9/2021 at 5:52 PM    An electronic signature was used to authenticate this note. **This report has been created using voice recognition software. It may contain minor errors which are inherent in voice recognition technology. **    I independently saw and evaluated the patient. I reviewed the nurse practitioners documentation above. Any additional comments or changes to the nurse practitioners documentation are stated below otherwise agree with assessment. Plan will be as follows:  Spent 30 minutes with the patient, of that greater than 16 minutes was spent in supportive psychotherapy. Patient denying side effects to medication. Reports overall doing well and constructive and forward-looking about possible discharge to [facility].   Waiting for confirmation final placement  PLAN  Patient s symptoms   are improving  Continue with current medication  Attempt to develop insight  Psycho-education conducted. Supportive Therapy conducted.   Probable discharge is Monday or Tuesday to full [facility] pending confirmation of placement  Follow-up daily while on inpatient unit

## 2021-04-09 NOTE — GROUP NOTE
Group Therapy Note    Date: 4/9/2021    Group Start Time: 1100  Group End Time: 9818  Group Topic: Cognitive Skills    MARY Andersen, CTRS        Group Therapy Note    Attendees: 10/17         Patient's Goal:  To increase social interaction and to practice decision making, and communication skills. Notes: Pt participated fully in group . Pt was able to practice decision making, and communication skills. Pt was pleasant and supportive of peers. .     Status After Intervention:  Improved         Participation Level:  Active Listener and Interactive     Participation Quality: Appropriate, Attentive, Sharing and Supportive        Speech:  normal         Thought Process/Content: Logical  Linear        Affective Functioning: Congruent        Mood: euthymic         Level of consciousness:  Alert, Oriented x4 and Attentive        Response to Learning: Able to verbalize current knowledge/experience, Able to verbalize/acknowledge new learning, Able to retain information and Progressing to goal        Endings: None Reported     Modes of Intervention: Education, Support, Socialization, Exploration, Clarifying and Problem-solving        Discipline Responsible: Psychoeducational Specialist        Signature:  Leonarda Cobos

## 2021-04-09 NOTE — PLAN OF CARE
Problem: Depressive Behavior With or Without Suicide Precautions:  Goal: Ability to disclose and discuss suicidal ideas will improve  Description: Ability to disclose and discuss suicidal ideas will improve  4/8/2021 2241 by Zaynab Castañeda RN  Outcome: Ongoing  Note: Patient is isolative to room during this shift and in behavior control Patient is cooperative with staff and medication compliant. He denies suicidal ideation and thoughts of self harm. Staff maintains Q 15 minute safety checks.      Problem: Tobacco Use:  Goal: Inpatient tobacco use cessation counseling participation  Description: Inpatient tobacco use cessation counseling participation  Outcome: Ongoing

## 2021-04-09 NOTE — BH NOTE
DISCHARGE PLANNING:  - All paperwork has been sent to [facility] and phone interview completed on 4/8/2021 with writer.   - Client will be contacting her today to do a phone screening for possible discharge for Monday

## 2021-04-10 PROCEDURE — 6370000000 HC RX 637 (ALT 250 FOR IP): Performed by: NURSE PRACTITIONER

## 2021-04-10 PROCEDURE — 6370000000 HC RX 637 (ALT 250 FOR IP): Performed by: PSYCHIATRY & NEUROLOGY

## 2021-04-10 PROCEDURE — 1240000000 HC EMOTIONAL WELLNESS R&B

## 2021-04-10 PROCEDURE — 99232 SBSQ HOSP IP/OBS MODERATE 35: CPT | Performed by: NURSE PRACTITIONER

## 2021-04-10 RX ORDER — NALTREXONE HYDROCHLORIDE 50 MG/1
50 TABLET, FILM COATED ORAL
Status: DISCONTINUED | OUTPATIENT
Start: 2021-04-11 | End: 2021-04-10

## 2021-04-10 RX ORDER — NALTREXONE HYDROCHLORIDE 50 MG/1
50 TABLET, FILM COATED ORAL ONCE
Status: COMPLETED | OUTPATIENT
Start: 2021-04-11 | End: 2021-04-11

## 2021-04-10 RX ADMIN — THIAMINE HCL TAB 100 MG 100 MG: 100 TAB at 08:44

## 2021-04-10 RX ADMIN — PROPRANOLOL HYDROCHLORIDE 80 MG: 20 TABLET ORAL at 08:44

## 2021-04-10 RX ADMIN — GABAPENTIN 800 MG: 400 CAPSULE ORAL at 08:43

## 2021-04-10 RX ADMIN — NAPROXEN 500 MG: 500 TABLET ORAL at 08:46

## 2021-04-10 RX ADMIN — PREDNISONE 5 MG: 5 TABLET ORAL at 09:00

## 2021-04-10 RX ADMIN — LAMOTRIGINE 100 MG: 100 TABLET ORAL at 20:49

## 2021-04-10 RX ADMIN — ACETAMINOPHEN, ASPIRIN AND CAFFEINE 2 TABLET: 250; 250; 65 TABLET, FILM COATED ORAL at 15:21

## 2021-04-10 RX ADMIN — BUSPIRONE HYDROCHLORIDE 15 MG: 15 TABLET ORAL at 14:15

## 2021-04-10 RX ADMIN — LEVETIRACETAM 500 MG: 500 TABLET ORAL at 08:44

## 2021-04-10 RX ADMIN — BUSPIRONE HYDROCHLORIDE 15 MG: 15 TABLET ORAL at 08:46

## 2021-04-10 RX ADMIN — MULTIPLE VITAMINS W/ MINERALS TAB 1 TABLET: TAB at 08:45

## 2021-04-10 RX ADMIN — MIRTAZAPINE 7.5 MG: 15 TABLET, FILM COATED ORAL at 20:49

## 2021-04-10 RX ADMIN — Medication 1.5 MG: at 20:49

## 2021-04-10 RX ADMIN — HYDROXYZINE HYDROCHLORIDE 50 MG: 50 TABLET, FILM COATED ORAL at 20:48

## 2021-04-10 RX ADMIN — NAPROXEN 500 MG: 500 TABLET ORAL at 17:14

## 2021-04-10 RX ADMIN — GABAPENTIN 800 MG: 400 CAPSULE ORAL at 14:15

## 2021-04-10 RX ADMIN — NICOTINE POLACRILEX 2 MG: 2 GUM, CHEWING BUCCAL at 15:21

## 2021-04-10 RX ADMIN — Medication 400 MG: at 08:45

## 2021-04-10 RX ADMIN — QUETIAPINE FUMARATE 50 MG: 50 TABLET, EXTENDED RELEASE ORAL at 09:00

## 2021-04-10 RX ADMIN — BUSPIRONE HYDROCHLORIDE 15 MG: 15 TABLET ORAL at 20:48

## 2021-04-10 RX ADMIN — QUETIAPINE FUMARATE 400 MG: 400 TABLET, EXTENDED RELEASE ORAL at 20:49

## 2021-04-10 RX ADMIN — LAMOTRIGINE 100 MG: 100 TABLET ORAL at 08:44

## 2021-04-10 RX ADMIN — LEVETIRACETAM 500 MG: 500 TABLET ORAL at 20:49

## 2021-04-10 RX ADMIN — ACETAMINOPHEN, ASPIRIN AND CAFFEINE 2 TABLET: 250; 250; 65 TABLET, FILM COATED ORAL at 08:46

## 2021-04-10 RX ADMIN — GABAPENTIN 800 MG: 400 CAPSULE ORAL at 20:48

## 2021-04-10 ASSESSMENT — PAIN SCALES - GENERAL
PAINLEVEL_OUTOF10: 2
PAINLEVEL_OUTOF10: 2
PAINLEVEL_OUTOF10: 5

## 2021-04-10 NOTE — PLAN OF CARE
[address] [place] [address]  Day 3 Interdisciplinary Treatment Plan NOTE    Review Date & Time: 4/10/2021 1302    Admission Type:   Admission Type: Voluntary    Reason for admission:  Reason for Admission: Alcohol abuse and depression  Estimated Length of Stay: 5-7 days  Estimated Discharge Date Update: to be determined by physician    PATIENT STRENGTHS:  Patient Strengths Strengths: Communication  Patient Strengths and Limitations:Limitations: Difficult relationships / poor social skills, Multiple barriers to leisure interests, Hopeless about future, Tendency to isolate self  Addictive Behavior:Addictive Behavior  In the past 3 months, have you felt or has someone told you that you have a problem with:  : None  Do you have a history of Chemical Use?: Yes(Pt reports using pot sporadically)  Do you have a history of Alcohol Use?: Yes(Pt reports extensive alcohol use reporting using a gallon or more of vodka and beer a day.)  Do you have a history of Street Drug Abuse?: No  Histroy of Prescripton Drug Abuse?: Yes(Pt reports usng suboxone)  Medical Problems:  Past Medical History:   Diagnosis Date    COPD (chronic obstructive pulmonary disease) ([facility] [address]     Hep B w/ coma     Seizures ([facility] [address]        Risk:  Fall RiskTotal: 75  Wade Scale Wade Scale Score: 22  BVC Total: 0  Change in scores no Changes to plan of Care no    Status EXAM:   Status and Exam  Normal: No  Facial Expression: Brightened  Affect: Appropriate  Level of Consciousness: Alert  Mood:Normal: No  Mood: Anxious  Motor Activity:Normal: Yes  Motor Activity: Tremors  Interview Behavior: Cooperative  Preception: [place] to Person, [place] to Time, [place] to Place, [place] to Situation  Attention:Normal: Yes  Attention: Distractible  Thought Processes: Circumstantial  Thought Content:Normal: No  Thought Content: Preoccupations  Hallucinations: None  Delusions: No  Delusions: (paranoid)  Memory:Normal: No  Memory: Poor Recent  Insight and Judgment: No  Insight and Judgment: Poor Judgment, Poor Insight  Present Suicidal Ideation: No  Present Homicidal Ideation: No    Daily Assessment Last Entry:   Daily Sleep (WDL): Exceptions to WDL         Patient Currently in Pain: Denies  Daily Nutrition (WDL): Within Defined Limits    Patient Monitoring:  Frequency of Checks: 4 times per hour, close    Psychiatric Symptoms:   Depression Symptoms  Depression Symptoms: No problems reported or observed. Anxiety Symptoms  Anxiety Symptoms: Generalized  Tatyana Symptoms  Tatyana Symptoms: No problems reported or observed. Psychosis Symptoms  Delusion Type: No problems reported or observed. Suicide Risk CSSR-S:  1) Within the past month, have you wished you were dead or wished you could go to sleep and not wake up? : No  2) Have you actually had any thoughts of killing yourself? : No  6) Have you ever done anything, started to do anything, or prepared to do anything to end your life?: No  Change in Result No Change in Plan of care No      EDUCATION:   EDUCATION:   Learner Progress Toward Treatment Goals: Reviewed results and recommendations of this team, Reviewed group plan and strategies, Reviewed signs, symptoms and risk of self harm and violent behavior, Reviewed goals and plan of care    Method:small group, individual verbal education    Outcome:verbalized by patient, but needs reinforcement to obtain goals    PATIENT GOALS:  Short term:  To have higher tolerance for triggers and increase positive coping skills  Long term: Maintain long-term sobriety     PLAN/TREATMENT RECOMMENDATIONS UPDATE: continue with group therapies, increased socialization, continue planning for after discharge goals, continue with medication compliance    SHORT-TERM GOALS UPDATE:   Time frame for Short-Term Goals: 5-7 days    LONG-TERM GOALS UPDATE:   Time frame for Long-Term Goals: 6 months  Members Present in Team Meeting: See Signature Sheet    Hammad Feliz

## 2021-04-10 NOTE — GROUP NOTE
Group Therapy Note    Date: 4/10/2021    Group Start Time: 1330  Group End Time: 1415  Group Topic: Psychoeducation    [facility]    Lee Zuniga        Group Therapy Note    Attendees: 7/17         Patient's Goal:  To increase positive coping skills    Notes:      Status After Intervention:  Improved    Participation Level:  Active Listener and Interactive    Participation Quality: Appropriate, Attentive and Sharing      Speech:  normal      Thought Process/Content: Logical  Linear      Affective Functioning: Congruent      Mood: euthymic      Level of consciousness:  Alert, Oriented x4 and Attentive      Response to Learning: Able to verbalize current knowledge/experience, Able to verbalize/acknowledge new learning, Able to retain information and Progressing to goal      Endings: None Reported    Modes of Intervention: Education, Support, Socialization, Exploration, Clarifying, Problem-solving and Activity      Discipline Responsible: Psychoeducational Specialist      Signature:  Lee Zuniga

## 2021-04-10 NOTE — PLAN OF CARE
Problem: Depressive Behavior With or Without Suicide Precautions:  Goal: Ability to disclose and discuss suicidal ideas will improve  Description: Ability to disclose and discuss suicidal ideas will improve  4/9/2021 2221 by Joaquin Jorge LPN  Outcome: Ongoing  Note: Patient denies suicidal ideations at this time,educated about jon for safety if feelings of suicide begins. Patient safety check every 15 minute     Problem: Tobacco Use:  Goal: Inpatient tobacco use cessation counseling participation  Description: Inpatient tobacco use cessation counseling participation  4/9/2021 2221 by Joaquin Jorge LPN  Outcome: Ongoing  Note: Patient given tobacco quitline number 70663653133 at this time, refusing to call at this time, states \" I just dont want to quit now\"- patient given information as to the dangers of long term tobacco use. Continue to reinforce the importance of tobacco cessation.

## 2021-04-10 NOTE — PLAN OF CARE
Problem: Depressive Behavior With or Without Suicide Precautions:  Goal: Ability to disclose and discuss suicidal ideas will improve  Description: Ability to disclose and discuss suicidal ideas will improve  Outcome: Ongoing  Note: Mr. Yulissa Mcintosh is out and active in the milieu throughout the shift, social with his peers. He attends therapeutic groups. Mr. Yulissa Mcintosh denies suicidal ideation and thoughts of harm to self and others. He is looking forward to discharge. Safety rounds continued.

## 2021-04-10 NOTE — GROUP NOTE
Group Therapy Note    Date: 4/10/2021    Group Start Time: 1000  Group End Time: 4212  Group Topic: Psychoeducation    [facility]    Nba Choi        Group Therapy Note    Attendees: 13/18         Patient's Goal:  PT will demonstrate increased interpersonal interaction  and participate in group activities of discussing Motivation and goal setting. Notes:  :  Patient is making progress, AEB participating in group discussion, actively listening, and supporting other group members. PT participates in group and encourages others to participate        Status After Intervention:  Improved    Participation Level: Active Listener and Interactive    Participation Quality: Appropriate, Attentive and Sharing      Speech:  loud      Thought Process/Content: Logical      Affective Functioning: Blunted      Mood: depressed      Level of consciousness:  Alert, Oriented x4 and Attentive      Response to Learning: Able to verbalize current knowledge/experience and Progressing to goal      Endings: None Reported    Modes of Intervention: Education, Support, Socialization and Exploration      Discipline Responsible: /Counselor      Signature:   Nba Choi

## 2021-04-10 NOTE — PROGRESS NOTES
BEHAVIORAL HEALTH FOLLOW-UP NOTE     4/10/2021     Patient was seen and examined in person, Chart reviewed   Patient's case discussed with staff/team    Chief Complaint: Depression and alcohol withdrawal    Interim History: Patient maintains medication compliance with all of his scheduled medications. He did require IM Haldol around 5 PM yesterday. He states that he is tired of receiving injections. He was reminded that there is p.o. Haldol for agitation, but his behavior must meet the requirements. He has many requests on assessment today. He continues to complain that Atarax does not help his anxiety, and he requested Xanax. He informed this writer that the attending physician had a discussion with him about benzos my he would not be receiving them. In review of [place], he has not been on Xanax. He is also requesting Antabuse for discharge. He mentions a discussion about the Vivitrol injection and naltrexone p.o. dose prior to initiation of Vivitrol. He is agreeable to begin Vivitrol, so p.o. dose will be ordered for tomorrow morning. If no side effects are noted from oral naltrexone, Vivitrol can be initiated on Monday prior to discharge. Patient states his mood is \"really good. \"  He has participated in groups today. He discusses a group discussion that had an effect on him due to some when discussing the death of a relative. He speaks of the death of his relatives, and how he related to this. He rates his depression 5 out of 10, with 10 being the highest; and anxiety 2 out of 10, also with 10 being the highest.  He states he is anxious about his discharge plans and the next steps with his sobriety. He denies hallucinations or delusions. He is denying suicidal ideations. He plans to discharge on Monday for inpatient rehab to [facility].       Appetite:  [x] Normal/Unchanged  [] Increased  [] Decreased      Sleep:       [] Normal/Unchanged  [x] Fair       [] Poor              Energy:    [x] Ear/Nose/Mouth/Throat  [] Respiratory  [] CV  [] GI  []   [] Musculoskeletal  [] Skin/Breast  [] Neurological  [] Endocrine  [] Heme/Lymph  [] Allergic/Immunologic    Explanation:   MEDICATIONS:    Current Facility-Administered Medications:     [START ON 4/11/2021] naltrexone (DEPADE) tablet 50 mg, 50 mg, Oral, Once, Emily Morrison, APRN - CNP    aspirin-acetaminophen-caffeine (Johnnie Cummings) per tablet 2 tablet, 2 tablet, Oral, Q6H PRN, Cheri Greenfield, APRN - CNP, 2 tablet at 04/10/21 0846    QUEtiapine (SEROQUEL XR) extended release tablet 50 mg, 50 mg, Oral, Daily, Jacques Muller, THERESA - CNP, 50 mg at 04/10/21 0900    melatonin tablet 1.5 mg, 1.5 mg, Oral, Nightly, Taj Neri MD, 1.5 mg at 04/09/21 2047    mirtazapine (REMERON) tablet 7.5 mg, 7.5 mg, Oral, Nightly, Taj Neri MD, 7.5 mg at 04/09/21 2049    QUEtiapine (SEROQUEL XR) extended release tablet 400 mg, 400 mg, Oral, Nightly, Taj Neri MD, 400 mg at 04/09/21 2049    albuterol sulfate  (90 Base) MCG/ACT inhaler 2 puff, 2 puff, Inhalation, Q6H PRN, Ru Carvalho APRN - CNP, 2 puff at 04/09/21 0840    busPIRone (BUSPAR) tablet 15 mg, 15 mg, Oral, TID, Ru Carvalho APRN - CNP, 15 mg at 04/10/21 1415    fluticasone (FLONASE) 50 MCG/ACT nasal spray 1 spray, 1 spray, Each Nostril, Daily, THERESA Cruz - CNP, 1 spray at 04/09/21 2120    gabapentin (NEURONTIN) capsule 800 mg, 800 mg, Oral, TID, Ru Carvalho APRN - CNP, 800 mg at 04/10/21 1415    ipratropium (ATROVENT HFA) 17 MCG/ACT inhaler 2 puff, 2 puff, Inhalation, Q4H PRN, THERESA Cruz - CNP, 2 puff at 04/09/21 1353    lamoTRIgine (LAMICTAL) tablet 100 mg, 100 mg, Oral, BID, Ru Carvalho, APRN - CNP, 100 mg at 04/10/21 0844    levETIRAcetam (KEPPRA) tablet 500 mg, 500 mg, Oral, BID, Ru Carvalho, APRN - CNP, 500 mg at 04/10/21 0844    magnesium oxide (MAG-OX) tablet 400 mg, 400 mg, Oral, Daily, Ru Carvalho, APRN - CNP, 400 mg at 04/10/21 0845    naproxen (NAPROSYN) tablet 500 mg, 500 mg, Oral, BID WC, Larey Sink, APRN - CNP, 500 mg at 04/10/21 0846    predniSONE (DELTASONE) tablet 5 mg, 5 mg, Oral, Daily, Larey Sink, APRN - CNP, 5 mg at 04/10/21 0900    propranolol (INDERAL) tablet 80 mg, 80 mg, Oral, Daily, Larey Sink, APRN - CNP, 80 mg at 04/10/21 0844    haloperidol lactate (HALDOL) injection 5 mg, 5 mg, Intramuscular, Q4H PRN, 5 mg at 04/09/21 1701 **OR** [DISCONTINUED] haloperidol (HALDOL) tablet 5 mg, 5 mg, Oral, Q4H PRN, Shereen Owens MD, 5 mg at 04/05/21 1223    thiamine mononitrate tablet 100 mg, 100 mg, Oral, Daily, Shereen Owens MD, 100 mg at 04/10/21 0844    therapeutic multivitamin-minerals 1 tablet, 1 tablet, Oral, Daily, Shereen wOens MD, 1 tablet at 04/10/21 0845    acetaminophen (TYLENOL) tablet 650 mg, 650 mg, Oral, Q4H PRN, Harlow Dubin, MD    aluminum & magnesium hydroxide-simethicone (MAALOX) 200-200-20 MG/5ML suspension 30 mL, 30 mL, Oral, Q6H PRN, Harlow Dubin, MD    hydrOXYzine (ATARAX) tablet 50 mg, 50 mg, Oral, TID PRN, Harlow Dubin, MD, 50 mg at 04/09/21 2050    nicotine polacrilex (NICORETTE) gum 2 mg, 2 mg, Oral, PRN, Harlow Dubin, MD, 2 mg at 04/09/21 1436    polyethylene glycol (GLYCOLAX) packet 17 g, 17 g, Oral, Daily PRN, Harlow Dubin, MD      Examination:  /76   Pulse 80   Temp 97.5 °F (36.4 °C) (Oral)   Resp 14   Ht 5' 9\" (1.753 m)   Wt 169 lb (76.7 kg)   BMI 24.96 kg/m²   Gait - steady   Medication side effects(SE): Denies    Mental Status Examination:    Level of consciousness: Awake and alert  Appearance: Casually dressed with fair grooming and hygiene  Behavior/Motor: Pleasant upon approach, sarcastic.   No psychomotor abnormality  Attitude toward examiner: cooperative, engaged in interview with good eye contact   Speech: hyperverbal, normal tone  Mood: \"really good\"  Affect: Mood congruent  Thought processes: Logical, goal oriented  Thought content: No specific homicidal ideation   suicidal Ideation: Improving, contracts for safety  Delusions: Denies, none observed or reported by staff  Perceptual Disturbance: Denies auditory, visual or tactile hallucinations  Cognition: Oriented to person, location general circumstance  Concentration distractible  Insight poor  Judgement poor    ASSESSMENT:   Patient symptoms are:  [] Well controlled  [x] Improving  [] Worsening  [] No change      Diagnosis:  Principal Problem:    MDD (major depressive disorder), recurrent severe, without psychosis ([facility]  Active Problems:    Depression with suicidal ideation    Alcohol withdrawal ([facility]  Resolved Problems:    * No resolved hospital problems. *      LABS:    No results for input(s): WBC, HGB, PLT in the last 72 hours. No results for input(s): NA, K, CL, CO2, BUN, CREATININE, GLUCOSE in the last 72 hours. No results for input(s): BILITOT, ALKPHOS, AST, ALT in the last 72 hours. Lab Results   Component Value Date    BARBSCNU NEGATIVE 09/06/2020    LABBENZ NEGATIVE 09/06/2020    LABMETH NEGATIVE 09/06/2020    PPXUR NOT REPORTED 09/06/2020     No results found for: TSH, FREET4  No results found for: LITHIUM  Lab Results   Component Value Date    VALPROATE 60 09/06/2020       Treatment Plan:  Reviewed current Medications with the patient. Patient to admit to [facility] on Monday  Naltrexone 50mg once tomorrow  If tolerates Naltrexone, Vivitrol on Monday  Monitor need and frequency of as needed medication, monitor to make sure that patient is not cheeking medication  Continue current scheduled medication regimen      Risks, benefits, side effects, drug-to-drug interactions and alternatives to treatment were discussed. The patient  consented to treatment. Encourage patient to attend group and other milieu activities.   Discharge planning discussed with the patient and treatment team, he will likely discharge to [facility] this week  Follow-up daily while on inpatient unit    PSYCHOTHERAPY/COUNSELING:  [] Therapeutic interview  [x] Supportive  [] CBT  [] Ongoing  [] Other    [x] Patient continues to need, on a daily basis, active treatment furnished directly by or requiring the supervision of inpatient psychiatric personnel      Anticipated Length of stay: Pending discharge, likely Monday to [facility]                                     Tomasz Seaman is a 50 y.o. male being evaluated face to face. --Ria Garces, APRN - CNP on 4/10/2021 at 2:53 PM    An electronic signature was used to authenticate this note. **This report has been created using voice recognition software. It may contain minor errors which are inherent in voice recognition technology. **

## 2021-04-11 PROCEDURE — 6370000000 HC RX 637 (ALT 250 FOR IP): Performed by: NURSE PRACTITIONER

## 2021-04-11 PROCEDURE — 6370000000 HC RX 637 (ALT 250 FOR IP): Performed by: PSYCHIATRY & NEUROLOGY

## 2021-04-11 PROCEDURE — 1240000000 HC EMOTIONAL WELLNESS R&B

## 2021-04-11 PROCEDURE — 99231 SBSQ HOSP IP/OBS SF/LOW 25: CPT | Performed by: NURSE PRACTITIONER

## 2021-04-11 RX ORDER — NALTREXONE HYDROCHLORIDE 50 MG/1
50 TABLET, FILM COATED ORAL ONCE
Status: DISCONTINUED | OUTPATIENT
Start: 2021-04-11 | End: 2021-04-11

## 2021-04-11 RX ADMIN — QUETIAPINE FUMARATE 400 MG: 400 TABLET, EXTENDED RELEASE ORAL at 20:50

## 2021-04-11 RX ADMIN — BUSPIRONE HYDROCHLORIDE 15 MG: 15 TABLET ORAL at 20:51

## 2021-04-11 RX ADMIN — LAMOTRIGINE 100 MG: 100 TABLET ORAL at 07:51

## 2021-04-11 RX ADMIN — MULTIPLE VITAMINS W/ MINERALS TAB 1 TABLET: TAB at 07:50

## 2021-04-11 RX ADMIN — GABAPENTIN 800 MG: 400 CAPSULE ORAL at 13:35

## 2021-04-11 RX ADMIN — NICOTINE POLACRILEX 2 MG: 2 GUM, CHEWING BUCCAL at 09:27

## 2021-04-11 RX ADMIN — HYDROXYZINE HYDROCHLORIDE 50 MG: 50 TABLET, FILM COATED ORAL at 18:15

## 2021-04-11 RX ADMIN — LAMOTRIGINE 100 MG: 100 TABLET ORAL at 20:51

## 2021-04-11 RX ADMIN — ACETAMINOPHEN, ASPIRIN AND CAFFEINE 2 TABLET: 250; 250; 65 TABLET, FILM COATED ORAL at 09:27

## 2021-04-11 RX ADMIN — FLUTICASONE PROPIONATE 1 SPRAY: 50 SPRAY, METERED NASAL at 07:52

## 2021-04-11 RX ADMIN — Medication 1.5 MG: at 20:49

## 2021-04-11 RX ADMIN — LEVETIRACETAM 500 MG: 500 TABLET ORAL at 07:50

## 2021-04-11 RX ADMIN — THIAMINE HCL TAB 100 MG 100 MG: 100 TAB at 07:51

## 2021-04-11 RX ADMIN — Medication 400 MG: at 07:50

## 2021-04-11 RX ADMIN — PROPRANOLOL HYDROCHLORIDE 80 MG: 20 TABLET ORAL at 07:50

## 2021-04-11 RX ADMIN — NAPROXEN 500 MG: 500 TABLET ORAL at 07:51

## 2021-04-11 RX ADMIN — GABAPENTIN 800 MG: 400 CAPSULE ORAL at 07:50

## 2021-04-11 RX ADMIN — BUSPIRONE HYDROCHLORIDE 15 MG: 15 TABLET ORAL at 13:35

## 2021-04-11 RX ADMIN — MIRTAZAPINE 7.5 MG: 15 TABLET, FILM COATED ORAL at 20:51

## 2021-04-11 RX ADMIN — BUSPIRONE HYDROCHLORIDE 15 MG: 15 TABLET ORAL at 07:51

## 2021-04-11 RX ADMIN — PREDNISONE 5 MG: 5 TABLET ORAL at 07:51

## 2021-04-11 RX ADMIN — QUETIAPINE FUMARATE 50 MG: 50 TABLET, EXTENDED RELEASE ORAL at 07:50

## 2021-04-11 RX ADMIN — NALTREXONE HYDROCHLORIDE 50 MG: 50 TABLET, FILM COATED ORAL at 07:50

## 2021-04-11 RX ADMIN — LEVETIRACETAM 500 MG: 500 TABLET ORAL at 20:51

## 2021-04-11 RX ADMIN — GABAPENTIN 800 MG: 400 CAPSULE ORAL at 20:50

## 2021-04-11 RX ADMIN — NAPROXEN 500 MG: 500 TABLET ORAL at 17:33

## 2021-04-11 ASSESSMENT — PAIN SCALES - GENERAL
PAINLEVEL_OUTOF10: 8
PAINLEVEL_OUTOF10: 1
PAINLEVEL_OUTOF10: 5
PAINLEVEL_OUTOF10: 8
PAINLEVEL_OUTOF10: 3

## 2021-04-11 ASSESSMENT — PAIN DESCRIPTION - LOCATION: LOCATION: ARM;COCCYX

## 2021-04-11 NOTE — GROUP NOTE
Group Therapy Note    Date: 4/11/2021    Group Start Time: 1100  Group End Time: 36  Group Topic: Recreational    [facility]    Aniyah Prieto LPN                   Status After Intervention:  Unchanged    Participation Level: Interactive    Participation Quality: Sharing      Speech:  normal      Thought Process/Content: Logical      Affective Functioning: Congruent      Mood: anxious      Level of consciousness:  Oriented x4      Response to Learning: Able to verbalize/acknowledge new learning      Endings: None Reported    Modes of Intervention: Socialization      Discipline Responsible: Licensed Practical Nurse      Signature:  Gokul Andrew LPN

## 2021-04-11 NOTE — GROUP NOTE
Group Therapy Note    Date: 4/11/2021    Group Start Time: 1000  Group End Time: 2510  Group Topic: Psychoeducation    [facility]    Erica Askew        Group Therapy Note    Attendees: 11/21         Patient's Goal:  PT will participate actively in group discussion using conversation cubes. Notes:  Patient is making progress, AEB participating in group discussion, actively listening, and supporting other group members. PT participates in group and encourages others to participate     Status After Intervention:  Improved    Participation Level: Active Listener and Interactive    Participation Quality: Inappropriate      Speech:  loud      Thought Process/Content: Flight of ideas      Affective Functioning: Exaggerated      Mood: elevated      Level of consciousness:  Alert, Oriented x4 and Attentive      Response to Learning: Able to verbalize current knowledge/experience and Progressing to goal      Endings: None Reported    Modes of Intervention: Education, Support, Socialization and Exploration      Discipline Responsible: /Counselor      Signature:   Erica Askew

## 2021-04-11 NOTE — PLAN OF CARE
Problem: Tobacco Use:  Goal: Inpatient tobacco use cessation counseling participation  Description: Inpatient tobacco use cessation counseling participation  Outcome: Ongoing   Patient uses Nicorette gum to help control tobacco cravings   Problem: Depressive Behavior With or Without Suicide Precautions:  Goal: Ability to disclose and discuss suicidal ideas will improve  Description: Ability to disclose and discuss suicidal ideas will improve  Outcome: Ongoing    is seen in the dayroom, affect is brightened, he reports an improved mood and is looking forward to going to [facility], he is social with peers and attends groups. Denies any thoughts of self harm. Reports good sleep and no issues with appetite.  15 minute safety rounds continue

## 2021-04-11 NOTE — PLAN OF CARE
Problem: Depressive Behavior With or Without Suicide Precautions:  Goal: Ability to disclose and discuss suicidal ideas will improve  Description: Ability to disclose and discuss suicidal ideas will improve  4/10/2021 2218 by Ramirez Cintron RN  Outcome: Ongoing  Note: Pt denies suicidal/homicidal ideation and visual/auditory hallucinations, Is out watching television and is social with peers. Pt is medication compliant. 15 minute safety rounds maintained per protocol. Will continue to monitor. Problem: Tobacco Use:  Goal: Inpatient tobacco use cessation counseling participation  Description: Inpatient tobacco use cessation counseling participation  4/10/2021 2218 by Ramirez Cintron RN  Outcome: Ongoing  Note: Patient given tobacco quitline number 16988312963 at this time, refusing to call at this time, states \" I just dont want to quit now\"- patient given information as to the dangers of long term tobacco use. Continue to reinforce the importance of tobacco cessation.

## 2021-04-11 NOTE — GROUP NOTE
Group Therapy Note    Date: 4/11/2021    Group Start Time: 0900  Group End Time: 0915  Group Topic: Community Meeting    [facility]AKSHA Ernst        Group Therapy Note    Attendees: 11/21         Patient's Goal: To increase interpersonal interaction      Notes:      Status After Intervention:  Improved    Participation Level: Interactive and Monopolizing    Participation Quality: Sharing and Intrusive      Speech:  normal      Thought Process/Content: Logical  Linear      Affective Functioning: Congruent      Mood: euphoric      Level of consciousness:  Alert and Oriented x4      Response to Learning: Progressing to goal      Endings: None Reported    Modes of Intervention: Education, Support, Socialization, Exploration, Clarifying and Problem-solving      Discipline Responsible: Psychoeducational Specialist      Signature:  Amy Ernst

## 2021-04-11 NOTE — PROGRESS NOTES
Pt up to desk asking writer why the dr did not prescribed antabuse? Writer told the pt he would have to ask dr. Gabino Aguilar NP tomorrow for clarification. Pt yells at writer, \"Your just passing the [facility]. \" Leonora Jeter gives pt support et reassurance. Pt refuses any further explanation from this writer.

## 2021-04-11 NOTE — PROGRESS NOTES
 Years of education: Not on file    Highest education level: Not on file   Occupational History    Not on file   Social Needs    Financial resource strain: Not on file    Food insecurity     Worry: Not on file     Inability: Not on file    Transportation needs     Medical: Not on file     Non-medical: Not on file   Tobacco Use    Smoking status: Current Every Day Smoker     Packs/day: 1.00     Types: Cigarettes    Smokeless tobacco: Never Used   Substance and Sexual Activity    Alcohol use: Yes     Frequency: Never    Drug use: Yes     Types: Marijuana    Sexual activity: Not on file   Lifestyle    Physical activity     Days per week: Not on file     Minutes per session: Not on file    Stress: Not on file   Relationships    Social connections     Talks on phone: Not on file     Gets together: Not on file     Attends Judaism service: Not on file     Active member of club or organization: Not on file     Attends meetings of clubs or organizations: Not on file     Relationship status: Not on file    Intimate partner violence     Fear of current or ex partner: Not on file     Emotionally abused: Not on file     Physically abused: Not on file     Forced sexual activity: Not on file   Other Topics Concern    Not on file   Social History Narrative    Not on file           ROS:  [x] All negative/unchanged except if checked.  Explain positive(checked items) below:  [] Constitutional  [] Eyes  [] Ear/Nose/Mouth/Throat  [] Respiratory  [] CV  [] GI  []   [] Musculoskeletal  [] Skin/Breast  [] Neurological  [] Endocrine  [] Heme/Lymph  [] Allergic/Immunologic    Explanation:   MEDICATIONS:    Current Facility-Administered Medications:     naltrexone (VIVITROL) injection 380 mg, 380 mg, Intramuscular, Once, THERESA Norman CNP    aspirin-acetaminophen-caffeine (EXCEDRIN MIGRAINE) per tablet 2 tablet, 2 tablet, Oral, Q6H PRN, THERESA Brizuela CNP, 2 tablet at 04/11/21 0911    QUEtiapine (SEROQUEL XR) extended release tablet 50 mg, 50 mg, Oral, Daily, Jazmine Allan, APRN - CNP, 50 mg at 04/11/21 0750    melatonin tablet 1.5 mg, 1.5 mg, Oral, Nightly, Mary Kate Elizondo MD, 1.5 mg at 04/10/21 2049    mirtazapine (REMERON) tablet 7.5 mg, 7.5 mg, Oral, Nightly, Unknown Failing, MD, 7.5 mg at 04/10/21 2049    QUEtiapine (SEROQUEL XR) extended release tablet 400 mg, 400 mg, Oral, Nightly, Unknown Failing, MD, 400 mg at 04/10/21 2049    albuterol sulfate  (90 Base) MCG/ACT inhaler 2 puff, 2 puff, Inhalation, Q6H PRN, Gia Creamer, APRN - CNP, 2 puff at 04/09/21 0840    busPIRone (BUSPAR) tablet 15 mg, 15 mg, Oral, TID, Gia Creamer, APRN - CNP, 15 mg at 04/11/21 1335    fluticasone (FLONASE) 50 MCG/ACT nasal spray 1 spray, 1 spray, Each Nostril, Daily, Gia Creamer, APRN - CNP, 1 spray at 04/11/21 6334    gabapentin (NEURONTIN) capsule 800 mg, 800 mg, Oral, TID, Gia Creamer, APRN - CNP, 800 mg at 04/11/21 1335    ipratropium (ATROVENT HFA) 17 MCG/ACT inhaler 2 puff, 2 puff, Inhalation, Q4H PRN, Gia Creamer, APRN - CNP, 2 puff at 04/09/21 1353    lamoTRIgine (LAMICTAL) tablet 100 mg, 100 mg, Oral, BID, Gia Creamer, APRN - CNP, 100 mg at 04/11/21 0751    levETIRAcetam (KEPPRA) tablet 500 mg, 500 mg, Oral, BID, Gia Creamer, APRN - CNP, 500 mg at 04/11/21 0750    magnesium oxide (MAG-OX) tablet 400 mg, 400 mg, Oral, Daily, Gia Creamer, APRN - CNP, 400 mg at 04/11/21 0750    naproxen (NAPROSYN) tablet 500 mg, 500 mg, Oral, BID WC, Gia Creamer, APRN - CNP, 500 mg at 04/11/21 0751    propranolol (INDERAL) tablet 80 mg, 80 mg, Oral, Daily, Gia Creamfrances, APRN - CNP, 80 mg at 04/11/21 0750    haloperidol lactate (HALDOL) injection 5 mg, 5 mg, Intramuscular, Q4H PRN, 5 mg at 04/09/21 1701 **OR** [DISCONTINUED] haloperidol (HALDOL) tablet 5 mg, 5 mg, Oral, Q4H PRN, Kajal Castañeda MD, 5 mg at 04/05/21 1223    thiamine mononitrate tablet 100 mg, 100 mg, Oral, Daily, Deborah White MD, 100 mg at 04/11/21 0751    therapeutic multivitamin-minerals 1 tablet, 1 tablet, Oral, Daily, Deborah White MD, 1 tablet at 04/11/21 0750    acetaminophen (TYLENOL) tablet 650 mg, 650 mg, Oral, Q4H PRN, Chuck England MD    aluminum & magnesium hydroxide-simethicone (MAALOX) 200-200-20 MG/5ML suspension 30 mL, 30 mL, Oral, Q6H PRN, Chuck England MD    hydrOXYzine (ATARAX) tablet 50 mg, 50 mg, Oral, TID PRN, Chuck England MD, 50 mg at 04/10/21 2048    nicotine polacrilex (NICORETTE) gum 2 mg, 2 mg, Oral, PRN, Chuck England MD, 2 mg at 04/11/21 0918    polyethylene glycol (GLYCOLAX) packet 17 g, 17 g, Oral, Daily PRN, Chuck England MD      Examination:  /80   Pulse 89   Temp 98.9 °F (37.2 °C) (Oral)   Resp 14   Ht 5' 9\" (1.753 m)   Wt 169 lb (76.7 kg)   BMI 24.96 kg/m²   Gait - steady   Medication side effects(SE): Denies    Mental Status Examination:    Level of consciousness: Awake and alert  Appearance: Casually dressed with fair grooming and hygiene  Behavior/Motor: Pleasant, no psychomotor abnormality  Attitude toward examiner: cooperative, engaged in interview with good eye contact   Speech: hyperverbal, normal tone  Mood: \"Little anxious\"  Affect: Euthymic  Thought processes: Logical, goal oriented  Thought content: Denies homicidal ideation  suicidal Ideation: Improving, contracts for safety  Delusions: Denies, none observed or reported by staff  Perceptual Disturbance: Denies auditory, visual or tactile hallucinations  Cognition: Oriented to person, location general circumstance  Concentration improving  Insight improving  Judgement improving    ASSESSMENT:   Patient symptoms are:  [] Well controlled  [x] Improving  [] Worsening  [] No change      Diagnosis:  Principal Problem:    MDD (major depressive disorder), recurrent severe, without psychosis ([facility]  Active Problems:    Depression with suicidal using voice recognition software. It may contain minor errors which are inherent in voice recognition technology. **

## 2021-04-11 NOTE — GROUP NOTE
Group Therapy Note    Date: 4/11/2021    Group Start Time: 1330  Group End Time: 4659  Group Topic: Cognitive Skills    [facility]    Evan Ruiz        Group Therapy Note    Attendees: 12/20         Patient's Goal:  To increase interpersonal interaction    Notes:       Status After Intervention:  Improved    Participation Level:  Active Listener and Interactive    Participation Quality: Appropriate, Attentive and Sharing      Speech:  normal      Thought Process/Content: Logical  Linear      Affective Functioning: Congruent      Mood: euthymic      Level of consciousness:  Alert, Oriented x4 and Attentive      Response to Learning: Able to verbalize current knowledge/experience, Able to verbalize/acknowledge new learning, Able to retain information and Progressing to goal      Endings: None Reported    Modes of Intervention: Education, Support, Socialization, Exploration, Clarifying, Problem-solving and Activity      Discipline Responsible: Psychoeducational Specialist      Signature:  Evan Ruiz

## 2021-04-12 VITALS
SYSTOLIC BLOOD PRESSURE: 113 MMHG | DIASTOLIC BLOOD PRESSURE: 71 MMHG | RESPIRATION RATE: 15 BRPM | HEIGHT: 69 IN | TEMPERATURE: 98.3 F | HEART RATE: 84 BPM | BODY MASS INDEX: 25.03 KG/M2 | WEIGHT: 169 LBS | OXYGEN SATURATION: 99 %

## 2021-04-12 PROCEDURE — 99239 HOSP IP/OBS DSCHRG MGMT >30: CPT | Performed by: PSYCHIATRY & NEUROLOGY

## 2021-04-12 PROCEDURE — 6370000000 HC RX 637 (ALT 250 FOR IP): Performed by: NURSE PRACTITIONER

## 2021-04-12 PROCEDURE — 6370000000 HC RX 637 (ALT 250 FOR IP): Performed by: PSYCHIATRY & NEUROLOGY

## 2021-04-12 RX ORDER — GABAPENTIN 400 MG/1
800 CAPSULE ORAL 3 TIMES DAILY
Qty: 180 CAPSULE | Refills: 0 | Status: SHIPPED | OUTPATIENT
Start: 2021-04-12 | End: 2021-05-12

## 2021-04-12 RX ORDER — FLUTICASONE PROPIONATE 50 MCG
2 SPRAY, SUSPENSION (ML) NASAL DAILY
Qty: 1 BOTTLE | Refills: 0 | Status: SHIPPED | OUTPATIENT
Start: 2021-04-12

## 2021-04-12 RX ORDER — LEVETIRACETAM 500 MG/1
500 TABLET ORAL 2 TIMES DAILY
Qty: 60 TABLET | Refills: 0 | Status: SHIPPED | OUTPATIENT
Start: 2021-04-12

## 2021-04-12 RX ORDER — NAPROXEN 500 MG/1
500 TABLET ORAL 2 TIMES DAILY WITH MEALS
Qty: 60 TABLET | Refills: 0 | Status: SHIPPED | OUTPATIENT
Start: 2021-04-12

## 2021-04-12 RX ORDER — ALBUTEROL SULFATE 90 UG/1
1-2 AEROSOL, METERED RESPIRATORY (INHALATION) EVERY 4 HOURS PRN
Qty: 1 INHALER | Refills: 1 | Status: SHIPPED | OUTPATIENT
Start: 2021-04-12

## 2021-04-12 RX ORDER — GAUZE BANDAGE 2" X 2"
100 BANDAGE TOPICAL DAILY
Qty: 30 TABLET | Refills: 0 | Status: SHIPPED | OUTPATIENT
Start: 2021-04-13

## 2021-04-12 RX ORDER — QUETIAPINE 400 MG/1
400 TABLET, FILM COATED, EXTENDED RELEASE ORAL NIGHTLY
Qty: 30 TABLET | Refills: 0 | Status: SHIPPED | OUTPATIENT
Start: 2021-04-12

## 2021-04-12 RX ORDER — MIRTAZAPINE 7.5 MG/1
7.5 TABLET, FILM COATED ORAL NIGHTLY
Qty: 30 TABLET | Refills: 3 | Status: SHIPPED | OUTPATIENT
Start: 2021-04-12

## 2021-04-12 RX ORDER — PROPRANOLOL HYDROCHLORIDE 80 MG/1
80 TABLET ORAL DAILY
Qty: 30 TABLET | Refills: 0 | Status: SHIPPED | OUTPATIENT
Start: 2021-04-12

## 2021-04-12 RX ORDER — LANOLIN ALCOHOL/MO/W.PET/CERES
1.5 CREAM (GRAM) TOPICAL NIGHTLY
Qty: 30 TABLET | Refills: 0 | Status: SHIPPED | OUTPATIENT
Start: 2021-04-12

## 2021-04-12 RX ORDER — M-VIT,TX,IRON,MINS/CALC/FOLIC 27MG-0.4MG
1 TABLET ORAL DAILY
Qty: 30 TABLET | Refills: 0 | Status: SHIPPED | OUTPATIENT
Start: 2021-04-13

## 2021-04-12 RX ORDER — HYDROXYZINE 50 MG/1
50 TABLET, FILM COATED ORAL 3 TIMES DAILY PRN
Qty: 30 TABLET | Refills: 0 | Status: SHIPPED | OUTPATIENT
Start: 2021-04-12 | End: 2021-04-22

## 2021-04-12 RX ORDER — BUSPIRONE HYDROCHLORIDE 15 MG/1
15 TABLET ORAL 3 TIMES DAILY
Qty: 90 TABLET | Refills: 0 | Status: SHIPPED | OUTPATIENT
Start: 2021-04-12

## 2021-04-12 RX ORDER — LAMOTRIGINE 100 MG/1
TABLET ORAL
Qty: 60 TABLET | Refills: 0 | Status: SHIPPED | OUTPATIENT
Start: 2021-04-12 | End: 2021-05-02 | Stop reason: SDUPTHER

## 2021-04-12 RX ORDER — QUETIAPINE FUMARATE 50 MG/1
50 TABLET, EXTENDED RELEASE ORAL DAILY
Qty: 30 TABLET | Refills: 0 | Status: SHIPPED | OUTPATIENT
Start: 2021-04-13

## 2021-04-12 RX ADMIN — LEVETIRACETAM 500 MG: 500 TABLET ORAL at 08:25

## 2021-04-12 RX ADMIN — PROPRANOLOL HYDROCHLORIDE 80 MG: 20 TABLET ORAL at 08:24

## 2021-04-12 RX ADMIN — BUSPIRONE HYDROCHLORIDE 15 MG: 15 TABLET ORAL at 08:32

## 2021-04-12 RX ADMIN — ACETAMINOPHEN, ASPIRIN AND CAFFEINE 2 TABLET: 250; 250; 65 TABLET, FILM COATED ORAL at 08:31

## 2021-04-12 RX ADMIN — NAPROXEN 500 MG: 500 TABLET ORAL at 08:25

## 2021-04-12 RX ADMIN — Medication 400 MG: at 08:24

## 2021-04-12 RX ADMIN — THIAMINE HCL TAB 100 MG 100 MG: 100 TAB at 08:25

## 2021-04-12 RX ADMIN — MULTIPLE VITAMINS W/ MINERALS TAB 1 TABLET: TAB at 08:23

## 2021-04-12 RX ADMIN — GABAPENTIN 800 MG: 400 CAPSULE ORAL at 08:24

## 2021-04-12 RX ADMIN — QUETIAPINE FUMARATE 50 MG: 50 TABLET, EXTENDED RELEASE ORAL at 08:25

## 2021-04-12 RX ADMIN — ALBUTEROL SULFATE 2 PUFF: 90 AEROSOL, METERED RESPIRATORY (INHALATION) at 08:34

## 2021-04-12 RX ADMIN — FLUTICASONE PROPIONATE 1 SPRAY: 50 SPRAY, METERED NASAL at 08:26

## 2021-04-12 RX ADMIN — LAMOTRIGINE 100 MG: 100 TABLET ORAL at 08:25

## 2021-04-12 ASSESSMENT — PAIN SCALES - GENERAL
PAINLEVEL_OUTOF10: 4
PAINLEVEL_OUTOF10: 1

## 2021-04-12 NOTE — PLAN OF CARE
Patient alert and oriented  Speech is very pressured and pt makes dramatic gestures (does floor touch to expand lungs prior to using inhaler)  Edges on agitated but has maintained control.   Yfn Pleasant is random and not focused on any significant topic

## 2021-04-12 NOTE — PATIENT CARE CONFERENCE
[facility]  Discharge Note    Pt discharged with followings belongings:   Dentures: None  Vision - Corrective Lenses: Glasses  Hearing Aid: None  Jewelry: None  Body Piercings Removed: N/A  Clothing: Footwear, Jacket / coat, Pants, Shirt, Socks, Undergarments (Comment)  Were All Patient Medications Collected?: Yes  Other Valuables: Cell phone, Other (Comment)   Valuables sent home with self. Valuables retrieved from safe, Security envelope number:  [id number] & 45 & 46 & 47 & 48 and returned to patient. Patient education on aftercare instructions: yes  Information faxed to [facility]  by staff Patient verbalize understanding of AVS:  yes.     Status EXAM upon discharge:  Status and Exam  Normal: No  Facial Expression: Euphoric  Affect: Incongruent, Unstable  Level of Consciousness: Alert  Mood:Normal: No  Mood: Anxious, Euphoric, Irritable  Motor Activity:Normal: No  Motor Activity: Increased  Interview Behavior: Cooperative, Impulsive  Preception: Benge to Person, Fabiola Codie to Time, Benge to Place, Benge to Situation  Attention:Normal: No  Attention: Distractible  Thought Processes: Flt.of Ideas  Thought Content:Normal: No  Thought Content: Preoccupations  Hallucinations: None  Delusions: No  Delusions: (paranoid)  Memory:Normal: Yes  Memory: Poor Recent  Insight and Judgment: No  Insight and Judgment: Poor Insight, Poor Judgment, Unmotivated  Present Suicidal Ideation: No  Present Homicidal Ideation: No      Metabolic Screening:    No results found for: LABA1C    No results found for: CHOL  No results found for: TRIG  No results found for: HDL  No components found for: LDLCAL  No results found for: eZb Mortensen RN     Patient was discharged to [facility] with belongings via [employer] cab

## 2021-04-12 NOTE — PLAN OF CARE
Problem: Depressive Behavior With or Without Suicide Precautions:  Goal: Ability to disclose and discuss suicidal ideas will improve  Description: Ability to disclose and discuss suicidal ideas will improve  4/11/2021 2117 by Leidy James LPN  Outcome: Ongoing   Patient denies suicidal ideas at this time. Patient denies homicidal ideas at this time. Patient denies depressive symptoms at this time. Patient has been out in day room watching tv and socializing with peers. Patient is free of self harm at this time. Patient agrees to seek out staff if thoughts to harm self arise. Staff will provide support and reassurance as needed. Safety checks maintained every 15 minutes.
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
## 2021-04-19 ENCOUNTER — APPOINTMENT (OUTPATIENT)
Dept: GENERAL RADIOLOGY | Age: 56
End: 2021-04-19
Payer: MEDICAID

## 2021-04-19 ENCOUNTER — HOSPITAL ENCOUNTER (EMERGENCY)
Age: 56
Discharge: HOME | End: 2021-04-19
Attending: EMERGENCY MEDICINE
Payer: MEDICAID

## 2021-04-19 VITALS
DIASTOLIC BLOOD PRESSURE: 81 MMHG | HEART RATE: 69 BPM | TEMPERATURE: 98.5 F | RESPIRATION RATE: 17 BRPM | SYSTOLIC BLOOD PRESSURE: 124 MMHG | OXYGEN SATURATION: 98 %

## 2021-04-19 DIAGNOSIS — M79.601 RIGHT ARM PAIN: Primary | ICD-10-CM

## 2021-04-19 PROCEDURE — 99282 EMERGENCY DEPT VISIT SF MDM: CPT

## 2021-04-19 RX ORDER — ACETAMINOPHEN 500 MG
500 TABLET ORAL ONCE
Status: DISCONTINUED | OUTPATIENT
Start: 2021-04-19 | End: 2021-04-19 | Stop reason: HOSPADM

## 2021-04-19 ASSESSMENT — PAIN SCALES - GENERAL: PAINLEVEL_OUTOF10: 7
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
## 2021-04-20 NOTE — ED NOTES
Patient given discharge instructions      Evita Stafford RN  04/19/21 [id number]
son was killed in the [facility] and his wife was paralyzed. Patient was taking care of the spouse at home, when his two other children intervened and placed her at [employer]. He voiced anger towards this as the wife was taken out of his care. Patient reported that during his time in [facility], his children did not pay his mortgage and he lost housing. His income includes about $830/mo from the [facility]. He admits to spending most of his money and food stamps on drugs or [place] rides e.g., his ride to [employer]. Reviewed with ED attending and resident who are in agreement to discharge patient to the community.       Verna David, [address]  04/19/21 7347
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
## 2021-05-02 ENCOUNTER — APPOINTMENT (OUTPATIENT)
Dept: CT IMAGING | Age: 56
End: 2021-05-02
Payer: MEDICAID

## 2021-05-02 ENCOUNTER — APPOINTMENT (OUTPATIENT)
Dept: GENERAL RADIOLOGY | Age: 56
End: 2021-05-02
Payer: MEDICAID

## 2021-05-02 ENCOUNTER — HOSPITAL ENCOUNTER (EMERGENCY)
Age: 56
Discharge: HOME | End: 2021-05-02
Attending: EMERGENCY MEDICINE
Payer: MEDICAID

## 2021-05-02 VITALS
RESPIRATION RATE: 16 BRPM | BODY MASS INDEX: 25.2 KG/M2 | TEMPERATURE: 99.4 F | OXYGEN SATURATION: 93 % | SYSTOLIC BLOOD PRESSURE: 128 MMHG | HEART RATE: 80 BPM | DIASTOLIC BLOOD PRESSURE: 84 MMHG | WEIGHT: 176 LBS | HEIGHT: 70 IN

## 2021-05-02 DIAGNOSIS — R56.9 SEIZURE SECONDARY TO SUBTHERAPEUTIC ANTICONVULSANT MEDICATION (HCC): Primary | ICD-10-CM

## 2021-05-02 DIAGNOSIS — Z79.899 SEIZURE SECONDARY TO SUBTHERAPEUTIC ANTICONVULSANT MEDICATION (HCC): Primary | ICD-10-CM

## 2021-05-02 LAB
ABSOLUTE EOS #: 0.16 K/UL (ref 0–0.44)
ABSOLUTE IMMATURE GRANULOCYTE: <0.03 K/UL (ref 0–0.3)
ABSOLUTE LYMPH #: 1.71 K/UL (ref 1.1–3.7)
ABSOLUTE MONO #: 0.65 K/UL (ref 0.1–1.2)
ACETAMINOPHEN LEVEL: <5 UG/ML (ref 10–30)
ANION GAP SERPL CALCULATED.3IONS-SCNC: 14 MMOL/L (ref 9–17)
BASOPHILS # BLD: 0 % (ref 0–2)
BASOPHILS ABSOLUTE: 0.04 K/UL (ref 0–0.2)
BNP INTERPRETATION: NORMAL
BUN BLDV-MCNC: 18 MG/DL (ref 6–20)
BUN/CREAT BLD: ABNORMAL (ref 9–20)
CALCIUM SERPL-MCNC: 9 MG/DL (ref 8.6–10.4)
CHLORIDE BLD-SCNC: 101 MMOL/L (ref 98–107)
CHP ED QC CHECK: 96
CO2: 20 MMOL/L (ref 20–31)
CREAT SERPL-MCNC: 1.37 MG/DL (ref 0.7–1.2)
DIFFERENTIAL TYPE: ABNORMAL
EOSINOPHILS RELATIVE PERCENT: 2 % (ref 1–4)
ETHANOL PERCENT: 0.05 %
ETHANOL: 49 MG/DL
GFR AFRICAN AMERICAN: >60 ML/MIN
GFR NON-AFRICAN AMERICAN: 54 ML/MIN
GFR SERPL CREATININE-BSD FRML MDRD: ABNORMAL ML/MIN/{1.73_M2}
GFR SERPL CREATININE-BSD FRML MDRD: ABNORMAL ML/MIN/{1.73_M2}
GLUCOSE BLD-MCNC: 88 MG/DL (ref 70–99)
GLUCOSE BLD-MCNC: 96 MG/DL (ref 75–110)
HCT VFR BLD CALC: 38.7 % (ref 40.7–50.3)
HEMOGLOBIN: 12.7 G/DL (ref 13–17)
IMMATURE GRANULOCYTES: 0 %
KEPPRA: 9 UG/ML
LAMOTRIGINE LEVEL: 2.1 UG/ML (ref 3–15)
LYMPHOCYTES # BLD: 19 % (ref 24–43)
MAGNESIUM: 2.1 MG/DL (ref 1.6–2.6)
MCH RBC QN AUTO: 28.2 PG (ref 25.2–33.5)
MCHC RBC AUTO-ENTMCNC: 32.8 G/DL (ref 28.4–34.8)
MCV RBC AUTO: 85.8 FL (ref 82.6–102.9)
MONOCYTES # BLD: 7 % (ref 3–12)
NRBC AUTOMATED: 0 PER 100 WBC
PDW BLD-RTO: 15.7 % (ref 11.8–14.4)
PLATELET # BLD: 284 K/UL (ref 138–453)
PLATELET ESTIMATE: ABNORMAL
PMV BLD AUTO: 9.5 FL (ref 8.1–13.5)
POTASSIUM SERPL-SCNC: 4.7 MMOL/L (ref 3.7–5.3)
PRO-BNP: 58 PG/ML
RBC # BLD: 4.51 M/UL (ref 4.21–5.77)
RBC # BLD: ABNORMAL 10*6/UL
SALICYLATE LEVEL: <1 MG/DL (ref 3–10)
SEG NEUTROPHILS: 72 % (ref 36–65)
SEGMENTED NEUTROPHILS ABSOLUTE COUNT: 6.62 K/UL (ref 1.5–8.1)
SODIUM BLD-SCNC: 135 MMOL/L (ref 135–144)
TOXIC TRICYCLIC SC,BLOOD: NEGATIVE
TROPONIN INTERP: NORMAL
TROPONIN T: NORMAL NG/ML
TROPONIN, HIGH SENSITIVITY: 8 NG/L (ref 0–22)
WBC # BLD: 9.2 K/UL (ref 3.5–11.3)
WBC # BLD: ABNORMAL 10*3/UL

## 2021-05-02 PROCEDURE — G0480 DRUG TEST DEF 1-7 CLASSES: HCPCS

## 2021-05-02 PROCEDURE — 80177 DRUG SCRN QUAN LEVETIRACETAM: CPT

## 2021-05-02 PROCEDURE — 93005 ELECTROCARDIOGRAM TRACING: CPT | Performed by: STUDENT IN AN ORGANIZED HEALTH CARE EDUCATION/TRAINING PROGRAM

## 2021-05-02 PROCEDURE — 99285 EMERGENCY DEPT VISIT HI MDM: CPT

## 2021-05-02 PROCEDURE — 85025 COMPLETE CBC W/AUTO DIFF WBC: CPT

## 2021-05-02 PROCEDURE — 2500000003 HC RX 250 WO HCPCS: Performed by: STUDENT IN AN ORGANIZED HEALTH CARE EDUCATION/TRAINING PROGRAM

## 2021-05-02 PROCEDURE — 71045 X-RAY EXAM CHEST 1 VIEW: CPT

## 2021-05-02 PROCEDURE — 96365 THER/PROPH/DIAG IV INF INIT: CPT

## 2021-05-02 PROCEDURE — 83880 ASSAY OF NATRIURETIC PEPTIDE: CPT

## 2021-05-02 PROCEDURE — 80175 DRUG SCREEN QUAN LAMOTRIGINE: CPT

## 2021-05-02 PROCEDURE — 80143 DRUG ASSAY ACETAMINOPHEN: CPT

## 2021-05-02 PROCEDURE — 96367 TX/PROPH/DG ADDL SEQ IV INF: CPT

## 2021-05-02 PROCEDURE — 6360000002 HC RX W HCPCS: Performed by: STUDENT IN AN ORGANIZED HEALTH CARE EDUCATION/TRAINING PROGRAM

## 2021-05-02 PROCEDURE — 80048 BASIC METABOLIC PNL TOTAL CA: CPT

## 2021-05-02 PROCEDURE — 82947 ASSAY GLUCOSE BLOOD QUANT: CPT

## 2021-05-02 PROCEDURE — 83735 ASSAY OF MAGNESIUM: CPT

## 2021-05-02 PROCEDURE — 80307 DRUG TEST PRSMV CHEM ANLYZR: CPT

## 2021-05-02 PROCEDURE — 70450 CT HEAD/BRAIN W/O DYE: CPT

## 2021-05-02 PROCEDURE — 6370000000 HC RX 637 (ALT 250 FOR IP): Performed by: STUDENT IN AN ORGANIZED HEALTH CARE EDUCATION/TRAINING PROGRAM

## 2021-05-02 PROCEDURE — 84484 ASSAY OF TROPONIN QUANT: CPT

## 2021-05-02 PROCEDURE — 2580000003 HC RX 258: Performed by: STUDENT IN AN ORGANIZED HEALTH CARE EDUCATION/TRAINING PROGRAM

## 2021-05-02 PROCEDURE — 80179 DRUG ASSAY SALICYLATE: CPT

## 2021-05-02 RX ORDER — LAMOTRIGINE 25 MG/1
TABLET ORAL
Qty: 252 TABLET | Refills: 0 | Status: SHIPPED | OUTPATIENT
Start: 2021-05-02

## 2021-05-02 RX ORDER — 0.9 % SODIUM CHLORIDE 0.9 %
1000 INTRAVENOUS SOLUTION INTRAVENOUS ONCE
Status: COMPLETED | OUTPATIENT
Start: 2021-05-02 | End: 2021-05-02

## 2021-05-02 RX ORDER — LAMOTRIGINE 25 MG/1
25 TABLET ORAL ONCE
Status: COMPLETED | OUTPATIENT
Start: 2021-05-02 | End: 2021-05-02

## 2021-05-02 RX ADMIN — LAMOTRIGINE 25 MG: 25 TABLET ORAL at 20:50

## 2021-05-02 RX ADMIN — LEVETIRACETAM 2000 MG: 100 INJECTION, SOLUTION INTRAVENOUS at 21:05

## 2021-05-02 RX ADMIN — FOLIC ACID: 5 INJECTION, SOLUTION INTRAMUSCULAR; INTRAVENOUS; SUBCUTANEOUS at 19:38

## 2021-05-02 RX ADMIN — SODIUM CHLORIDE 1000 ML: 9 INJECTION, SOLUTION INTRAVENOUS at 18:38

## 2021-05-02 ASSESSMENT — ENCOUNTER SYMPTOMS
EYE PAIN: 0
RHINORRHEA: 0
BACK PAIN: 0
VOMITING: 0
ABDOMINAL PAIN: 0
NAUSEA: 0
SHORTNESS OF BREATH: 0
COUGH: 0

## 2021-05-02 ASSESSMENT — PAIN DESCRIPTION - PAIN TYPE: TYPE: CHRONIC PAIN

## 2021-05-02 ASSESSMENT — PAIN DESCRIPTION - DESCRIPTORS: DESCRIPTORS: ACHING

## 2021-05-02 ASSESSMENT — PAIN DESCRIPTION - LOCATION: LOCATION: BACK

## 2021-05-02 ASSESSMENT — PAIN SCALES - GENERAL: PAINLEVEL_OUTOF10: 8

## 2021-05-02 NOTE — ED NOTES
Bed: 33  Expected date:   Expected time:   Means of arrival:   Comments:  [address], RN  05/02/21 9457
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
## 2021-05-02 NOTE — ED PROVIDER NOTES
[address]  Emergency Department Encounter  Emergency Medicine Resident     Pt Name: Alivia Sánchez  MRN: [id number]  [place] 1965  Date of evaluation: 5/2/21  PCP:  No primary care provider on file. CHIEF COMPLAINT       Chief Complaint   Patient presents with    Altered Mental Status    Fall       HISTORY OF PRESENT ILLNESS  (Location/Symptom, Timing/Onset, Context/Setting, Quality, Duration, Modifying Factors, Severity.)    Alivia Sánchez is a 50 y.o. male who presents with altered mental status, syncopal episode allegedly by EMS. Also possibly had 2 falls in the past day or so. Patient is not the best historian. Patient states that he is taking all of his medication as prescribed. Patient states that he \"pleads the fifth\" on any alcohol or illicit substance usage. States has been taking his Keppra, Lamictal, Seroquel. He denies any seizures but then states he has not had any seizures \"as far as he knows\". Denies any pain. Denies any chest pain, shortness of breath, abdominal pain, nausea, vomiting. Denies any body aches, chills. Denies any difficulty urinating. Denies any paresthesias, numbness, tingling. PPE Worn:  Gloves: Yes  Eye Protection: Goggles  Mask: Surgical Mask  Gown: NO    PAST MEDICAL / SURGICAL / SOCIAL / FAMILY HISTORY    has a past medical history of COPD (chronic obstructive pulmonary disease) ([facility] [address] Hep B w/ coma, and Seizures ([facility] [address] has a past surgical history that includes hernia repair and back surgery. Social History     Socioeconomic History    Marital status:       Spouse name: Not on file    Number of children: Not on file    Years of education: Not on file    Highest education level: Not on file   Occupational History    Not on file   Social Needs    Financial resource strain: Not on file    Food insecurity     Worry: Not on file     Inability: Not on file    Transportation needs     Medical: Not on file     Non-medical: Not on file   Tobacco Use    Smoking status: Current Every Day Smoker     Packs/day: 1.00     Types: Cigarettes    Smokeless tobacco: Never Used   Substance and Sexual Activity    Alcohol use: Yes     Alcohol/week: 20.0 standard drinks     Types: 10 Cans of beer, 10 Shots of liquor per week     Frequency: Never    Drug use: Yes     Types: Marijuana     Comment: suboxone     Sexual activity: Not on file   Lifestyle    Physical activity     Days per week: Not on file     Minutes per session: Not on file    Stress: Not on file   Relationships    Social connections     Talks on phone: Not on file     Gets together: Not on file     Attends [employer] service: Not on file     Active member of club or organization: Not on file     Attends meetings of clubs or organizations: Not on file     Relationship status: Not on file    Intimate partner violence     Fear of current or ex partner: Not on file     Emotionally abused: Not on file     Physically abused: Not on file     Forced sexual activity: Not on file   Other Topics Concern    Not on file   Social History Narrative    Not on file       History reviewed. No pertinent family history. Allergies:    Patient has no known allergies. Home Medications:  Prior to Admission medications    Medication Sig Start Date End Date Taking? Authorizing Provider   lamoTRIgine (LAMICTAL) 25 MG tablet Week 1: 25mg once daily. Week 2: 25mg twice daily. Week 3: 50mg morning, 25mg nightly. Week 4: 50mg twice daily. Week 5: 75mg morning, 50mg nightly. Week 6: 75mg twice daily. Week 7: 100mg morning, 75mg nightly. Week 8: 100mg twice daily. 5/2/21  Yes Dipika Hicks MD   albuterol sulfate HFA (PROVENTIL HFA) 108 (90 Base) MCG/ACT inhaler Inhale 1-2 puffs into the lungs every 4 hours as needed for Wheezing or Shortness of Breath (Space out to every 6 hours as symptoms improve) Space out to every 6 hours as symptoms improve.  4/12/21   Yuli Fenton MD   busPIRone (BUSPAR) 15 MG tablet Take 15 mg by mouth 3 times daily 4/12/21   Laura Fuentes MD   fluticasone [facility]) 50 MCG/ACT nasal spray 2 sprays by Each Nostril route daily 4/12/21   Laura Fuentes MD   gabapentin (NEURONTIN) 400 MG capsule Take 2 capsules by mouth 3 times daily for 30 days. 4/12/21 5/12/21  Laura Fuentes MD   ipratropium (ATROVENT HFA) 17 MCG/ACT inhaler Inhale 2 puffs into the lungs every 4 hours as needed for Wheezing 4/12/21   Laura Fuentes MD   levETIRAcetam (KEPPRA) 500 MG tablet Take 1 tablet by mouth 2 times daily 4/12/21   Laura Fuentes MD   magnesium oxide (MAG-OX) 400 (241.3 Mg) MG TABS tablet Take 1 tablet by mouth daily 4/12/21   Laura Fuentes MD   melatonin 3 MG TABS tablet Take 0.5 tablets by mouth nightly 4/12/21   Laura Fuentes MD   mirtazapine (REMERON) 7.5 MG tablet Take 1 tablet by mouth nightly 4/12/21   Laura Fuentes MD   naproxen (NAPROSYN) 500 MG tablet Take 1 tablet by mouth 2 times daily (with meals) 4/12/21   Laura Fuentes MD   propranolol (INDERAL) 80 MG tablet Take 1 tablet by mouth daily 4/12/21   Laura Fuentes MD   QUEtiapine (SEROQUEL XR) 400 MG extended release tablet Take 1 tablet by mouth nightly 4/12/21   Laura Fuentes MD   QUEtiapine (SEROQUEL XR) 50 MG extended release tablet Take 1 tablet by mouth daily 4/13/21   Laura Fuentes MD   Multiple Vitamins-Minerals (THERAPEUTIC MULTIVITAMIN-MINERALS) tablet Take 1 tablet by mouth daily 4/13/21   Laura Fuentes MD   thiamine mononitrate 100 MG tablet Take 1 tablet by mouth daily 4/13/21   Laura Fuentes MD   naltrexone (VIVITROL) 380 MG injection Inject 380 mg into the muscle every 30 days 5/11/21   Laura Fuentes MD       REVIEW OF SYSTEMS    (2-9 systems for level 4, 10 or more for level 5)    Review of Systems   Constitutional: Negative for chills, fatigue and fever. HENT: Negative for congestion and rhinorrhea. Eyes: Negative for pain and visual disturbance.    Respiratory: Negative for cough and shortness of breath. Cardiovascular: Negative for chest pain. Gastrointestinal: Negative for abdominal pain, nausea and vomiting. Genitourinary: Negative for flank pain. Musculoskeletal: Negative for back pain, myalgias and neck pain. Neurological: Positive for syncope. Negative for light-headedness and headaches. Psychiatric/Behavioral: Positive for confusion. All other systems reviewed and are negative. PHYSICAL EXAM   (up to 7 for level 4, 8 or more for level 5)    INITIAL VITALS:   ED Triage Vitals [05/02/21 1822]   BP Temp Temp Source Pulse Resp SpO2 Height Weight   108/73 99.4 °F (37.4 °C) Oral 87 16 95 % 5' 10\" (1.778 m) 176 lb (79.8 kg)       Physical Exam  Vitals signs and nursing note reviewed. Constitutional:       General: He is not in acute distress. Appearance: Normal appearance. He is not ill-appearing or toxic-appearing. HENT:      Head: No right periorbital erythema or left periorbital erythema. Eyes:      Extraocular Movements: Extraocular movements intact. Pupils: Pupils are equal, round, and reactive to light. Neck:      Musculoskeletal: Full passive range of motion without pain, normal range of motion and neck supple. No spinous process tenderness or muscular tenderness. Cardiovascular:      Rate and Rhythm: Normal rate and regular rhythm. Pulses: Normal pulses. Radial pulses are 2+ on the right side and 2+ on the left side. Posterior tibial pulses are 2+ on the right side and 2+ on the left side. Heart sounds: Normal heart sounds. No murmur. Pulmonary:      Effort: Pulmonary effort is normal. No respiratory distress. Breath sounds: Normal breath sounds. No decreased breath sounds, wheezing or rhonchi. Chest:      Chest wall: No tenderness. Abdominal:      Palpations: Abdomen is soft. Tenderness: There is no abdominal tenderness. Skin:     General: Skin is warm and dry.       Capillary Refill: Capillary refill takes less than 2 seconds. Neurological:      General: No focal deficit present. Mental Status: He is alert. GCS: GCS eye subscore is 4. GCS verbal subscore is 4. GCS motor subscore is 6. Cranial Nerves: No dysarthria or facial asymmetry. Motor: Motor function is intact. Psychiatric:         Behavior: Behavior is cooperative. Thought Content: Thought content does not include homicidal or suicidal ideation. Thought content does not include homicidal or suicidal plan. Cognition and Memory: He exhibits impaired recent memory. DIFFERENTIAL  DIAGNOSIS   PLAN (LABS / IMAGING / EKG):  Orders Placed This Encounter   Procedures    XR CHEST PORTABLE    CT HEAD WO CONTRAST    CBC Auto Differential    BASIC METABOLIC PANEL    MAGNESIUM    Troponin    Brain Natriuretic Peptide    TOX SCR, BLD, ED    Levetiracetam Level    Lamotrigine Level    Inpatient consult to Neurology    POCT glucose    POC Glucose Fingerstick    EKG 12 Lead       MEDICATIONS ORDERED:  Orders Placed This Encounter   Medications    0.9 % sodium chloride bolus    folic acid 1 mg, thiamine (B-1) 100 mg in dextrose 5 % 50 mL IVPB    levETIRAcetam (KEPPRA) 2,000 mg in sodium chloride 0.9 % 100 mL IVPB    lamoTRIgine (LAMICTAL) tablet 25 mg    lamoTRIgine (LAMICTAL) 25 MG tablet     Sig: Week 1: 25mg once daily. Week 2: 25mg twice daily. Week 3: 50mg morning, 25mg nightly. Week 4: 50mg twice daily. Week 5: 75mg morning, 50mg nightly. Week 6: 75mg twice daily. Week 7: 100mg morning, 75mg nightly. Week 8: 100mg twice daily.      Dispense:  252 tablet     Refill:  0         DIAGNOSTIC RESULTS / EMERGENCYDEPARTMENT COURSE / MDM   LABS:  Labs Reviewed   CBC WITH AUTO DIFFERENTIAL - Abnormal; Notable for the following components:       Result Value    Hemoglobin 12.7 (*)     Hematocrit 38.7 (*)     RDW 15.7 (*)     Seg Neutrophils 72 (*)     Lymphocytes 19 (*)     All other components within normal limits   BASIC METABOLIC PANEL - Abnormal; Notable for the following components:    CREATININE 1.37 (*)     GFR Non- 54 (*)     All other components within normal limits   TOX SCR, BLD, ED - Abnormal; Notable for the following components:    Acetaminophen Level <5 (*)     Ethanol 49 (*)     Ethanol percent 1.853 (*)     Salicylate Lvl <1 (*)     All other components within normal limits   LAMOTRIGINE LEVEL - Abnormal; Notable for the following components:    Lamotrigine Lvl 2.1 (*)     All other components within normal limits   POCT GLUCOSE - Normal   MAGNESIUM   TROPONIN   BRAIN NATRIURETIC PEPTIDE   LEVETIRACETAM LEVEL   POC GLUCOSE FINGERSTICK       RADIOLOGY:  Ct Head Wo Contrast    Result Date: 5/2/2021  EXAMINATION: CT OF THE HEAD WITHOUT CONTRAST  5/2/2021 7:50 pm TECHNIQUE: CT of the head was performed without the administration of intravenous contrast. Dose modulation, iterative reconstruction, and/or weight based adjustment of the mA/kV was utilized to reduce the radiation dose to as low as reasonably achievable. COMPARISON: Head CT of 09/06/2020 HISTORY: ORDERING SYSTEM PROVIDED HISTORY: syncope, ams, does not recall events TECHNOLOGIST PROVIDED HISTORY: -SIRD syncope, ams, does not recall events Decision Support Exception - unselect if not a suspected or confirmed emergency medical condition->Emergency Medical Condition (MA) Reason for Exam: syncope, ams, does not recall events Acuity: Acute Type of Exam: Initial FINDINGS: Findings: There is no acute infarction, intracranial hemorrhage or intracranial mass lesion. No mass effect, midline shift or extra-axial collection is noted. The brain parenchyma is normal. The cerebellar tonsils are in normal position. The ventricles, sulci, and cisterns are within normal limits in size and configuration. The globes and orbits are within normal limits. Moderate mucosal thickening of ethmoidal air cells.   The visualized extracranial structures including paranasal sinuses and mastoid air cells are otherwise unremarkable. No fracture is identified. Stable study. No evidence for acute intracranial hemorrhage, territorial infarction or intracranial mass lesion. Moderate mucosal thickening of the ethmoidal air cells. Xr Chest Portable    Result Date: 5/2/2021  EXAMINATION: ONE XRAY VIEW OF THE CHEST 5/2/2021 7:09 pm COMPARISON: 03/29/2021 HISTORY: ORDERING SYSTEM PROVIDED HISTORY: syncope, low o2 sat TECHNOLOGIST PROVIDED HISTORY: -SIRD syncope, low o2 sat Reason for Exam: upr FINDINGS: Chest radiograph: The cardiomediastinal silhouette and hilar contours are normal. The lungs are clear with no focal consolidation, pleural effusion or pneumothorax. Unchanged chronic fracture deformities of right chest wall. The overlying soft tissue and osseous structures do not demonstrate acute abnormality. No acute intrathoracic pathology. Unchanged chronic fracture deformities of right chest wall. EKG    EKG Interpretation    EKG Interpretation    Interpreted by me    Rhythm: normal sinus   Rate: normal, 89  Axis: Left axis deviation  Ectopy: none  Conduction: normal  ST Segments: no acute change  T Waves: Flattening, aVL  Q Waves: none    Clinical Impression: Sinus rhythm, rate of 89. No ST segment changes, no arrhythmia, no ectopy. Left axis deviation with poor R wave progression. T wave flattening in aVL. Flattening in aVL appears chronic since EKG dated 3/29/2021. All EKG's are interpreted by the Emergency Department Physician whoeither signs or Co-signs this chart in the absence of a cardiologist.    Impression:  Presents with possible syncope and falls over the past few days. Does have a history of seizures. Patient states has been compliant with his medication. When asked if patient is taking his Keppra he says yes, when asked if he is taking his Lamictal he says yes.   When asked if patient is taking his Bactrim, patient says yes and when asked if he is taking his vancomycin, patient also says yes. Bactrim and vancomycin are not listed on patient's medication list, I was simply trying to see if patient was saying yes to all of his medications. Not the best historian but denies any headache or neck pain. Denies any chest pain or shortness of breath although patient did have a lower oxygen saturation around 90% originally and is on 2 L currently. Clear lung sounds however. Will check levels of Lamictal, Keppra, lab work, ED tox screen. Suspect combination of possible illicit drug use, alcohol, and noncompliance with seizures. Chest x-ray, CT head. EMERGENCY DEPARTMENT COURSE:  ED Course as of May 02 2148   Sun May 02, 2021   8274 Elevated ethanol level combined with low Lamictal level and low normal Keppra level likely contributing to patient's syncopal episodes which were most likely seizures. Will give loading dose of Keppra and speak with neurology regarding Lamictal level.    [AP]   2021 Neurology recommended restarting Lamictal at starting dose, 25 mg and increasing from there.    [AP]      ED Course User Index  [AP] Olegario Stark MD     Low normal Keppra level, loaded with 2 g.  Given 25 mg Lamictal.  Modified patient's prescription for Lamictal to restart and 25 mg daily and increase from there. Patient now conscious, alert, and orient x4, answers all questions quickly and appropriately. Was able to ambulate to bathroom without difficulty. Plan for discharge with new Lamictal prescription, follow-up PCP and also gave patient number for neurology clinic since he states that his PCP writes for his seizure medication and has not formally been evaluated by a neurologist.  Patient safe for discharge.     MDM  Number of Diagnoses or Management Options  Seizure secondary to subtherapeutic anticonvulsant medication ([facility] [address]): new, needed workup     Amount and/or Complexity of Data Reviewed  Clinical lab tests: ordered and mis-transcribed.)          Sarabjit Champagne MD  05/02/21 3223

## 2021-05-03 LAB
EKG ATRIAL RATE: 89 BPM
EKG P AXIS: 61 DEGREES
EKG P-R INTERVAL: 150 MS
EKG Q-T INTERVAL: 364 MS
EKG QRS DURATION: 96 MS
EKG QTC CALCULATION (BAZETT): 442 MS
EKG R AXIS: -20 DEGREES
EKG T AXIS: 62 DEGREES
EKG VENTRICULAR RATE: 89 BPM

## 2021-05-03 PROCEDURE — 93010 ELECTROCARDIOGRAM REPORT: CPT | Performed by: INTERNAL MEDICINE

## 2021-05-03 NOTE — ED NOTES
Pt resting on cot, RR even and unlabored, NAD, A&O, call light in reach, denies any needs     Yuliet Erazo, BETTINA  05/02/21 9958

## 2021-05-03 NOTE — ED TRIAGE NOTES
Pt brought in by EMS for a possible syncopal episode. Pt was found down in a field by a bystander. Pt is a poor historian and is not sure what happened today. Pt had a couple falls within the past three days. Pt c/o chronic back pain. RR even and unlabored, NAD, A&O, ambulatory. Call light in reach at bedside, pt placed on monitor.

## 2021-05-03 NOTE — ED NOTES
Pt resting on cot, RR even and unlabored, NAD, A&O, call light in reach, denies any needs     Jere Hernandez RN  05/02/21 4182

## 2021-08-16 ENCOUNTER — HOSPITAL ENCOUNTER (OUTPATIENT)
Age: 56
Setting detail: SPECIMEN
Discharge: HOME | End: 2021-08-16
Payer: MEDICAID

## 2021-08-17 LAB
-: NORMAL
AMORPHOUS: NORMAL
AMPHETAMINE SCREEN URINE: NEGATIVE
BACTERIA: NORMAL
BARBITURATE SCREEN URINE: NEGATIVE
BENZODIAZEPINE SCREEN, URINE: NEGATIVE
BILIRUBIN URINE: NEGATIVE
BUPRENORPHINE URINE: ABNORMAL
C. TRACHOMATIS DNA ,URINE: NEGATIVE
CANNABINOID SCREEN URINE: POSITIVE
CASTS UA: NORMAL /LPF (ref 0–8)
COCAINE METABOLITE, URINE: NEGATIVE
COLOR: YELLOW
COMMENT UA: ABNORMAL
CREATININE URINE: 82.5 MG/DL (ref 39–259)
CRYSTALS, UA: NORMAL /HPF
EPITHELIAL CELLS UA: NORMAL /HPF (ref 0–5)
GLUCOSE URINE: NEGATIVE
KETONES, URINE: NEGATIVE
LEUKOCYTE ESTERASE, URINE: ABNORMAL
MDMA URINE: ABNORMAL
METHADONE SCREEN, URINE: NEGATIVE
METHAMPHETAMINE, URINE: ABNORMAL
MICROALBUMIN/CREAT 24H UR: <12 MG/L
MICROALBUMIN/CREAT UR-RTO: NORMAL MCG/MG CREAT
MUCUS: NORMAL
N. GONORRHOEAE DNA, URINE: NEGATIVE
NITRITE, URINE: NEGATIVE
OPIATES, URINE: NEGATIVE
OTHER OBSERVATIONS UA: NORMAL
OXYCODONE SCREEN URINE: NEGATIVE
PH UA: 6.5 (ref 5–8)
PHENCYCLIDINE, URINE: NEGATIVE
PROPOXYPHENE, URINE: ABNORMAL
PROTEIN UA: NEGATIVE
RBC UA: NORMAL /HPF (ref 0–4)
RENAL EPITHELIAL, UA: NORMAL /HPF
SPECIFIC GRAVITY UA: 1.01 (ref 1–1.03)
SPECIMEN DESCRIPTION: NORMAL
TEST INFORMATION: ABNORMAL
TRICHOMONAS: NORMAL
TRICYCLIC ANTIDEPRESSANTS, UR: ABNORMAL
TURBIDITY: CLEAR
URINE HGB: NEGATIVE
UROBILINOGEN, URINE: NORMAL
WBC UA: NORMAL /HPF (ref 0–5)
YEAST: NORMAL

## 2021-08-18 LAB
ODESMETHYLTRAMADOL, URINE: <25 NG/ML
SOURCE: NORMAL
TRAMADOL, URINE: <25 NG/ML
TRICHOMONAS VAGINALI, MOLECULAR: NEGATIVE

## 2021-08-19 LAB — GABAPENTIN QNT URINE: <5 UG/ML

## 2023-09-22 NOTE — ED NOTES
Called pt with appointment reminder.  Spoke with pt and appt confirmed.     Patient in need of transportation home. SW contacted [facility] for transportation. ETA unknown.

## 2024-05-16 NOTE — DISCHARGE INSTR - COC
BMI 24.96 kg/m²     Last documented pain score (0-10 scale): Pain Level: 1  Last Weight:   Wt Readings from Last 1 Encounters:   21 169 lb (76.7 kg)     Mental Status:  {IP PT MENTAL STATUS:84039}    IV Access:  { STAS IV ACCESS:194233524}    Nursing Mobility/ADLs:  Walking   {P DME LDIT:106999301}  Transfer  {CHP DME ZCLK:823046082}  Bathing  {CHP DME ZNSS:541750166}  Dressing  {CHP DME XHLK:595365670}  Toileting  {CHP DME VYDE:805095870}  Feeding  {P DME NTMT:856599760}  Med Admin  {P DME VHMB:947628409}  Med Delivery   { STAS MED Delivery:836844118}    Wound Care Documentation and Therapy:        Elimination:  Continence:   · Bowel: {YES / BM:85982}  · Bladder: {YES / JS:61051}  Urinary Catheter: {Urinary Catheter:634766800}   Colostomy/Ileostomy/Ileal Conduit: {YES / YH:77396}       Date of Last BM: ***  No intake or output data in the 24 hours ending 21 1003  No intake/output data recorded.     Safety Concerns:     [address] [employer] Safety Concerns:081745368}    Impairments/Disabilities:      [address] [employer] Impairments/Disabilities:210097687}    Nutrition Therapy:  Current Nutrition Therapy:   [address] [employer] Diet List:998998803}    Routes of Feeding: {[facility] DME Other Feedings:697285164}  Liquids: {Slp liquid thickness:61975}  Daily Fluid Restriction: {CHP DME Yes amt example:087414908}  Last Modified Barium Swallow with Video (Video Swallowing Test): {Done Not Done EWGK:569703171}    Treatments at the Time of Hospital Discharge:   Respiratory Treatments: ***  Oxygen Therapy:  {Therapy; copd oxygen:67284}  Ventilator:    {[facility] Vent PBVO:857879080}    Rehab Therapies: {THERAPEUTIC INTERVENTION:9717565546}  Weight Bearing Status/Restrictions: [address]  Weight Bearin}  Other Medical Equipment (for information only, NOT a DME order):  {EQUIPMENT:846676494}  Other Treatments: ***    Patient's personal belongings (please select all that are sent with patient):  {[facility]ON[facility] DME Belongings:668047006}    RN SIGNATURE: Unable to answer due to medical condition/unresponsive/etc... {Esignature:133384031}    CASE MANAGEMENT/SOCIAL WORK SECTION    Inpatient Status Date: ***    Readmission Risk Assessment Score:  Readmission Risk              Risk of Unplanned Readmission:        13           Discharging to Facility/ Agency   · Name:   · Address:  · Phone:  · Fax:    Dialysis Facility (if applicable)   · Name:  · Address:  · Dialysis Schedule:  · Phone:  · Fax:    / signature: {Esignature:367465201}    PHYSICIAN SECTION    Prognosis: {Prognosis:6809309480}    Condition at Discharge: [address] [place] [address] Patient Condition:710360140}    Rehab Potential (if transferring to Rehab): {Prognosis:2498960933}    Recommended Labs or Other Treatments After Discharge: ***    Physician Certification: I certify the above information and transfer of Susan Weber  is necessary for the continuing treatment of the diagnosis listed and that he requires {Admit to Appropriate Level of Care:38900} for {GREATER/LESS:548834137} 30 days.      Update Admission H&P: {CHP DME Changes in YKDOV:746391654}    PHYSICIAN SIGNATURE:  {Esignature:406792915}